# Patient Record
Sex: FEMALE | Race: WHITE | NOT HISPANIC OR LATINO
[De-identification: names, ages, dates, MRNs, and addresses within clinical notes are randomized per-mention and may not be internally consistent; named-entity substitution may affect disease eponyms.]

---

## 2017-01-06 ENCOUNTER — FORM ENCOUNTER (OUTPATIENT)
Age: 62
End: 2017-01-06

## 2017-01-07 ENCOUNTER — APPOINTMENT (OUTPATIENT)
Dept: CT IMAGING | Facility: CLINIC | Age: 62
End: 2017-01-07

## 2017-01-07 ENCOUNTER — OUTPATIENT (OUTPATIENT)
Dept: OUTPATIENT SERVICES | Facility: HOSPITAL | Age: 62
LOS: 1 days | End: 2017-01-07
Payer: COMMERCIAL

## 2017-01-07 DIAGNOSIS — Z00.8 ENCOUNTER FOR OTHER GENERAL EXAMINATION: ICD-10-CM

## 2017-01-07 PROCEDURE — 82565 ASSAY OF CREATININE: CPT

## 2017-01-07 PROCEDURE — 74177 CT ABD & PELVIS W/CONTRAST: CPT

## 2017-01-18 ENCOUNTER — APPOINTMENT (OUTPATIENT)
Dept: UROLOGY | Facility: CLINIC | Age: 62
End: 2017-01-18

## 2017-01-18 ENCOUNTER — LABORATORY RESULT (OUTPATIENT)
Age: 62
End: 2017-01-18

## 2017-01-18 VITALS
WEIGHT: 140 LBS | DIASTOLIC BLOOD PRESSURE: 79 MMHG | SYSTOLIC BLOOD PRESSURE: 116 MMHG | HEART RATE: 85 BPM | HEIGHT: 60 IN | BODY MASS INDEX: 27.48 KG/M2

## 2017-01-18 VITALS — TEMPERATURE: 98 F

## 2017-01-18 DIAGNOSIS — Z82.62 FAMILY HISTORY OF OSTEOPOROSIS: ICD-10-CM

## 2017-01-18 DIAGNOSIS — G47.33 OBSTRUCTIVE SLEEP APNEA (ADULT) (PEDIATRIC): ICD-10-CM

## 2017-01-18 DIAGNOSIS — S62.509A FRACTURE OF UNSPECIFIED PHALANX OF UNSPECIFIED THUMB, INITIAL ENCOUNTER FOR CLOSED FRACTURE: ICD-10-CM

## 2017-01-18 DIAGNOSIS — Z86.39 PERSONAL HISTORY OF OTHER ENDOCRINE, NUTRITIONAL AND METABOLIC DISEASE: ICD-10-CM

## 2017-01-18 DIAGNOSIS — Z85.828 PERSONAL HISTORY OF OTHER MALIGNANT NEOPLASM OF SKIN: ICD-10-CM

## 2017-01-18 DIAGNOSIS — Z84.89 FAMILY HISTORY OF OTHER SPECIFIED CONDITIONS: ICD-10-CM

## 2017-01-18 DIAGNOSIS — Z86.59 PERSONAL HISTORY OF OTHER MENTAL AND BEHAVIORAL DISORDERS: ICD-10-CM

## 2017-01-18 DIAGNOSIS — Z82.5 FAMILY HISTORY OF ASTHMA AND OTHER CHRONIC LOWER RESPIRATORY DISEASES: ICD-10-CM

## 2017-01-18 LAB
BILIRUB UR QL STRIP: NORMAL
CLARITY UR: CLEAR
COLLECTION METHOD: NORMAL
GLUCOSE UR-MCNC: NORMAL
HCG UR QL: NORMAL EU/DL
HGB UR QL STRIP.AUTO: NORMAL
KETONES UR-MCNC: NORMAL
LEUKOCYTE ESTERASE UR QL STRIP: NORMAL
NITRITE UR QL STRIP: NORMAL
PH UR STRIP: 7
PROT UR STRIP-MCNC: NORMAL
SP GR UR STRIP: 1.02

## 2017-01-19 LAB
APPEARANCE: CLEAR
BACTERIA: NEGATIVE
BILIRUBIN URINE: NEGATIVE
BLOOD URINE: NEGATIVE
COLOR: YELLOW
GLUCOSE QUALITATIVE U: NORMAL MG/DL
KETONES URINE: NEGATIVE
LEUKOCYTE ESTERASE URINE: ABNORMAL
MICROSCOPIC-UA: NORMAL
NITRITE URINE: NEGATIVE
PH URINE: 6.5
PROTEIN URINE: NEGATIVE MG/DL
RED BLOOD CELLS URINE: 1 /HPF
SPECIFIC GRAVITY URINE: 1.02
SQUAMOUS EPITHELIAL CELLS: 1 /HPF
UROBILINOGEN URINE: NORMAL MG/DL
WHITE BLOOD CELLS URINE: 2 /HPF

## 2017-01-20 LAB — BACTERIA UR CULT: ABNORMAL

## 2017-01-25 ENCOUNTER — TRANSCRIPTION ENCOUNTER (OUTPATIENT)
Age: 62
End: 2017-01-25

## 2017-01-30 ENCOUNTER — RESULT REVIEW (OUTPATIENT)
Age: 62
End: 2017-01-30

## 2017-01-31 ENCOUNTER — OUTPATIENT (OUTPATIENT)
Dept: OUTPATIENT SERVICES | Facility: HOSPITAL | Age: 62
LOS: 1 days | Discharge: HOME | End: 2017-01-31
Payer: COMMERCIAL

## 2017-01-31 DIAGNOSIS — R19.7 DIARRHEA, UNSPECIFIED: ICD-10-CM

## 2017-01-31 PROCEDURE — 88305 TISSUE EXAM BY PATHOLOGIST: CPT

## 2017-01-31 PROCEDURE — 45380 COLONOSCOPY AND BIOPSY: CPT

## 2017-01-31 PROCEDURE — 88305 TISSUE EXAM BY PATHOLOGIST: CPT | Mod: 26

## 2017-02-02 LAB — SURGICAL PATHOLOGY FINAL REPORT - CH: SIGNIFICANT CHANGE UP

## 2017-03-02 ENCOUNTER — MEDICATION RENEWAL (OUTPATIENT)
Age: 62
End: 2017-03-02

## 2017-03-29 ENCOUNTER — MEDICATION RENEWAL (OUTPATIENT)
Age: 62
End: 2017-03-29

## 2017-03-31 ENCOUNTER — RX RENEWAL (OUTPATIENT)
Age: 62
End: 2017-03-31

## 2017-04-12 ENCOUNTER — APPOINTMENT (OUTPATIENT)
Dept: UROLOGY | Facility: CLINIC | Age: 62
End: 2017-04-12

## 2017-04-12 VITALS
SYSTOLIC BLOOD PRESSURE: 136 MMHG | DIASTOLIC BLOOD PRESSURE: 91 MMHG | BODY MASS INDEX: 27.29 KG/M2 | HEIGHT: 60 IN | HEART RATE: 88 BPM | WEIGHT: 139 LBS

## 2017-04-12 DIAGNOSIS — R31.9 HEMATURIA, UNSPECIFIED: ICD-10-CM

## 2017-04-14 ENCOUNTER — APPOINTMENT (OUTPATIENT)
Dept: DERMATOLOGY | Facility: CLINIC | Age: 62
End: 2017-04-14

## 2017-04-27 ENCOUNTER — OTHER (OUTPATIENT)
Age: 62
End: 2017-04-27

## 2017-07-19 ENCOUNTER — APPOINTMENT (OUTPATIENT)
Dept: FAMILY MEDICINE | Facility: CLINIC | Age: 62
End: 2017-07-19

## 2017-07-19 VITALS
BODY MASS INDEX: 28.07 KG/M2 | HEART RATE: 79 BPM | HEIGHT: 60 IN | OXYGEN SATURATION: 91 % | WEIGHT: 143 LBS | SYSTOLIC BLOOD PRESSURE: 143 MMHG | DIASTOLIC BLOOD PRESSURE: 80 MMHG

## 2017-07-19 RX ORDER — FAMOTIDINE 40 MG/1
40 TABLET, FILM COATED ORAL
Qty: 6 | Refills: 0 | Status: DISCONTINUED | COMMUNITY
Start: 2016-09-07 | End: 2017-07-19

## 2017-07-19 RX ORDER — METHYLPREDNISOLONE 4 MG/1
4 TABLET ORAL
Qty: 21 | Refills: 0 | Status: DISCONTINUED | COMMUNITY
Start: 2016-09-07 | End: 2017-07-19

## 2017-07-19 RX ORDER — BUDESONIDE 9 MG/1
9 TABLET, EXTENDED RELEASE ORAL DAILY
Qty: 30 | Refills: 4 | Status: DISCONTINUED | COMMUNITY
Start: 2017-03-02 | End: 2017-07-19

## 2017-07-19 RX ORDER — SODIUM SULFATE, POTASSIUM SULFATE, MAGNESIUM SULFATE 17.5; 3.13; 1.6 G/ML; G/ML; G/ML
17.5-3.13-1.6 SOLUTION, CONCENTRATE ORAL
Qty: 354 | Refills: 0 | Status: DISCONTINUED | COMMUNITY
Start: 2017-01-11 | End: 2017-07-19

## 2017-07-19 RX ORDER — BACILLUS COAGULANS/INULIN 1B-250 MG
CAPSULE ORAL
Refills: 0 | Status: DISCONTINUED | COMMUNITY
End: 2017-07-19

## 2017-07-20 ENCOUNTER — APPOINTMENT (OUTPATIENT)
Dept: GASTROENTEROLOGY | Facility: CLINIC | Age: 62
End: 2017-07-20

## 2017-07-20 VITALS
RESPIRATION RATE: 16 BRPM | HEART RATE: 79 BPM | OXYGEN SATURATION: 100 % | DIASTOLIC BLOOD PRESSURE: 89 MMHG | HEIGHT: 60 IN | BODY MASS INDEX: 27.48 KG/M2 | SYSTOLIC BLOOD PRESSURE: 131 MMHG | WEIGHT: 140 LBS

## 2017-08-16 ENCOUNTER — APPOINTMENT (OUTPATIENT)
Dept: FAMILY MEDICINE | Facility: CLINIC | Age: 62
End: 2017-08-16
Payer: COMMERCIAL

## 2017-08-16 VITALS
SYSTOLIC BLOOD PRESSURE: 110 MMHG | DIASTOLIC BLOOD PRESSURE: 72 MMHG | HEIGHT: 60 IN | BODY MASS INDEX: 28.27 KG/M2 | WEIGHT: 144 LBS | HEART RATE: 72 BPM

## 2017-08-16 PROCEDURE — 99213 OFFICE O/P EST LOW 20 MIN: CPT

## 2017-08-21 ENCOUNTER — OTHER (OUTPATIENT)
Age: 62
End: 2017-08-21

## 2017-09-06 ENCOUNTER — APPOINTMENT (OUTPATIENT)
Dept: FAMILY MEDICINE | Facility: CLINIC | Age: 62
End: 2017-09-06
Payer: COMMERCIAL

## 2017-09-06 VITALS
HEIGHT: 60 IN | SYSTOLIC BLOOD PRESSURE: 140 MMHG | DIASTOLIC BLOOD PRESSURE: 80 MMHG | WEIGHT: 150 LBS | RESPIRATION RATE: 15 BRPM | HEART RATE: 68 BPM | OXYGEN SATURATION: 97 % | BODY MASS INDEX: 29.45 KG/M2

## 2017-09-06 DIAGNOSIS — F43.9 REACTION TO SEVERE STRESS, UNSPECIFIED: ICD-10-CM

## 2017-09-06 PROCEDURE — 99214 OFFICE O/P EST MOD 30 MIN: CPT

## 2017-09-09 PROBLEM — F43.9 STRESS: Status: ACTIVE | Noted: 2017-09-09

## 2017-09-12 ENCOUNTER — APPOINTMENT (OUTPATIENT)
Dept: OBGYN | Facility: CLINIC | Age: 62
End: 2017-09-12
Payer: COMMERCIAL

## 2017-09-12 VITALS
HEIGHT: 66 IN | WEIGHT: 145 LBS | DIASTOLIC BLOOD PRESSURE: 88 MMHG | SYSTOLIC BLOOD PRESSURE: 134 MMHG | BODY MASS INDEX: 23.3 KG/M2

## 2017-09-12 DIAGNOSIS — Z87.09 PERSONAL HISTORY OF OTHER DISEASES OF THE RESPIRATORY SYSTEM: ICD-10-CM

## 2017-09-12 DIAGNOSIS — Z87.19 PERSONAL HISTORY OF OTHER DISEASES OF THE DIGESTIVE SYSTEM: ICD-10-CM

## 2017-09-12 PROCEDURE — 99396 PREV VISIT EST AGE 40-64: CPT

## 2017-09-14 LAB — HPV HIGH+LOW RISK DNA PNL CVX: NEGATIVE

## 2017-09-18 LAB — CYTOLOGY CVX/VAG DOC THIN PREP: NORMAL

## 2017-10-03 ENCOUNTER — RX RENEWAL (OUTPATIENT)
Age: 62
End: 2017-10-03

## 2017-10-04 ENCOUNTER — APPOINTMENT (OUTPATIENT)
Dept: FAMILY MEDICINE | Facility: CLINIC | Age: 62
End: 2017-10-04
Payer: COMMERCIAL

## 2017-10-04 VITALS
HEART RATE: 91 BPM | HEIGHT: 66 IN | RESPIRATION RATE: 15 BRPM | TEMPERATURE: 98.2 F | DIASTOLIC BLOOD PRESSURE: 70 MMHG | WEIGHT: 145 LBS | BODY MASS INDEX: 23.3 KG/M2 | OXYGEN SATURATION: 98 % | SYSTOLIC BLOOD PRESSURE: 120 MMHG

## 2017-10-04 PROCEDURE — 99214 OFFICE O/P EST MOD 30 MIN: CPT | Mod: 25

## 2017-10-04 PROCEDURE — 90686 IIV4 VACC NO PRSV 0.5 ML IM: CPT

## 2017-10-04 PROCEDURE — G0008: CPT

## 2017-10-17 ENCOUNTER — APPOINTMENT (OUTPATIENT)
Dept: GASTROENTEROLOGY | Facility: CLINIC | Age: 62
End: 2017-10-17
Payer: COMMERCIAL

## 2017-10-17 VITALS
SYSTOLIC BLOOD PRESSURE: 154 MMHG | DIASTOLIC BLOOD PRESSURE: 88 MMHG | HEIGHT: 60 IN | RESPIRATION RATE: 16 BRPM | HEART RATE: 91 BPM | BODY MASS INDEX: 27.88 KG/M2 | WEIGHT: 142 LBS

## 2017-10-17 PROCEDURE — 99214 OFFICE O/P EST MOD 30 MIN: CPT

## 2017-10-20 ENCOUNTER — RESULT REVIEW (OUTPATIENT)
Age: 62
End: 2017-10-20

## 2017-10-20 ENCOUNTER — APPOINTMENT (OUTPATIENT)
Dept: DERMATOLOGY | Facility: CLINIC | Age: 62
End: 2017-10-20
Payer: COMMERCIAL

## 2017-10-20 PROCEDURE — 99214 OFFICE O/P EST MOD 30 MIN: CPT | Mod: 25

## 2017-10-20 PROCEDURE — 17003 DESTRUCT PREMALG LES 2-14: CPT

## 2017-10-20 PROCEDURE — 17000 DESTRUCT PREMALG LESION: CPT

## 2017-10-20 PROCEDURE — 17260 DSTRJ MAL LES T/A/L 0.5 CM/<: CPT | Mod: 59

## 2017-10-30 ENCOUNTER — RX RENEWAL (OUTPATIENT)
Age: 62
End: 2017-10-30

## 2017-11-21 ENCOUNTER — RX RENEWAL (OUTPATIENT)
Age: 62
End: 2017-11-21

## 2017-11-25 ENCOUNTER — RX RENEWAL (OUTPATIENT)
Age: 62
End: 2017-11-25

## 2017-11-27 ENCOUNTER — APPOINTMENT (OUTPATIENT)
Dept: FAMILY MEDICINE | Facility: CLINIC | Age: 62
End: 2017-11-27
Payer: COMMERCIAL

## 2017-11-27 VITALS
HEIGHT: 60 IN | HEART RATE: 88 BPM | SYSTOLIC BLOOD PRESSURE: 120 MMHG | BODY MASS INDEX: 29.06 KG/M2 | DIASTOLIC BLOOD PRESSURE: 68 MMHG | WEIGHT: 148 LBS

## 2017-11-27 PROCEDURE — 99214 OFFICE O/P EST MOD 30 MIN: CPT

## 2017-11-27 RX ORDER — MESALAMINE 375 MG/1
0.38 CAPSULE, EXTENDED RELEASE ORAL DAILY
Qty: 120 | Refills: 3 | Status: DISCONTINUED | COMMUNITY
Start: 2017-10-17 | End: 2017-11-27

## 2017-11-27 RX ORDER — BUDESONIDE 9 MG/1
9 TABLET, EXTENDED RELEASE ORAL DAILY
Qty: 90 | Refills: 1 | Status: DISCONTINUED | COMMUNITY
Start: 2017-04-04 | End: 2017-11-27

## 2017-11-29 ENCOUNTER — RX RENEWAL (OUTPATIENT)
Age: 62
End: 2017-11-29

## 2017-12-20 ENCOUNTER — APPOINTMENT (OUTPATIENT)
Dept: FAMILY MEDICINE | Facility: CLINIC | Age: 62
End: 2017-12-20
Payer: COMMERCIAL

## 2017-12-20 VITALS
HEIGHT: 60 IN | HEART RATE: 100 BPM | BODY MASS INDEX: 28.86 KG/M2 | WEIGHT: 147 LBS | DIASTOLIC BLOOD PRESSURE: 76 MMHG | SYSTOLIC BLOOD PRESSURE: 126 MMHG

## 2017-12-20 PROCEDURE — 99214 OFFICE O/P EST MOD 30 MIN: CPT

## 2017-12-29 ENCOUNTER — MEDICATION RENEWAL (OUTPATIENT)
Age: 62
End: 2017-12-29

## 2018-01-02 ENCOUNTER — RX RENEWAL (OUTPATIENT)
Age: 63
End: 2018-01-02

## 2018-01-09 ENCOUNTER — OTHER (OUTPATIENT)
Age: 63
End: 2018-01-09

## 2018-02-09 ENCOUNTER — APPOINTMENT (OUTPATIENT)
Dept: FAMILY MEDICINE | Facility: CLINIC | Age: 63
End: 2018-02-09
Payer: COMMERCIAL

## 2018-02-09 VITALS
HEIGHT: 60 IN | SYSTOLIC BLOOD PRESSURE: 138 MMHG | WEIGHT: 149.5 LBS | HEART RATE: 88 BPM | DIASTOLIC BLOOD PRESSURE: 80 MMHG | BODY MASS INDEX: 29.35 KG/M2

## 2018-02-09 DIAGNOSIS — R39.9 UNSPECIFIED SYMPTOMS AND SIGNS INVOLVING THE GENITOURINARY SYSTEM: ICD-10-CM

## 2018-02-09 DIAGNOSIS — Z87.440 PERSONAL HISTORY OF URINARY (TRACT) INFECTIONS: ICD-10-CM

## 2018-02-09 DIAGNOSIS — Z87.898 PERSONAL HISTORY OF OTHER SPECIFIED CONDITIONS: ICD-10-CM

## 2018-02-09 DIAGNOSIS — Z92.29 PERSONAL HISTORY OF OTHER DRUG THERAPY: ICD-10-CM

## 2018-02-09 DIAGNOSIS — E04.1 NONTOXIC SINGLE THYROID NODULE: ICD-10-CM

## 2018-02-09 PROCEDURE — 99214 OFFICE O/P EST MOD 30 MIN: CPT

## 2018-02-11 PROBLEM — Z92.29 HISTORY OF INFLUENZA VACCINATION: Status: RESOLVED | Noted: 2017-10-04 | Resolved: 2018-02-11

## 2018-02-20 ENCOUNTER — LABORATORY RESULT (OUTPATIENT)
Age: 63
End: 2018-02-20

## 2018-02-22 ENCOUNTER — OUTPATIENT (OUTPATIENT)
Dept: OUTPATIENT SERVICES | Facility: HOSPITAL | Age: 63
LOS: 1 days | End: 2018-02-22
Payer: COMMERCIAL

## 2018-02-22 DIAGNOSIS — Z51.89 ENCOUNTER FOR OTHER SPECIFIED AFTERCARE: ICD-10-CM

## 2018-02-22 DIAGNOSIS — M54.81 OCCIPITAL NEURALGIA: ICD-10-CM

## 2018-02-27 LAB
25(OH)D3 SERPL-MCNC: 38 NG/ML
ALBUMIN SERPL ELPH-MCNC: 4.2 G/DL
ALP BLD-CCNC: 65 U/L
ALT SERPL-CCNC: 18 U/L
ANION GAP SERPL CALC-SCNC: 15 MMOL/L
APPEARANCE: CLEAR
AST SERPL-CCNC: 20 U/L
BASOPHILS # BLD AUTO: 0.04 K/UL
BASOPHILS NFR BLD AUTO: 0.5 %
BILIRUB SERPL-MCNC: 0.5 MG/DL
BILIRUBIN URINE: NEGATIVE
BLOOD URINE: ABNORMAL
BUN SERPL-MCNC: 14 MG/DL
CALCIUM SERPL-MCNC: 9.4 MG/DL
CHLORIDE SERPL-SCNC: 105 MMOL/L
CHOLEST SERPL-MCNC: 220 MG/DL
CHOLEST/HDLC SERPL: 2.1 RATIO
CO2 SERPL-SCNC: 24 MMOL/L
COLOR: YELLOW
CREAT SERPL-MCNC: 0.67 MG/DL
EOSINOPHIL # BLD AUTO: 0.2 K/UL
EOSINOPHIL NFR BLD AUTO: 2.4 %
GLUCOSE QUALITATIVE U: NEGATIVE MG/DL
GLUCOSE SERPL-MCNC: 105 MG/DL
HBA1C MFR BLD HPLC: 5.9 %
HCT VFR BLD CALC: 38.8 %
HDLC SERPL-MCNC: 104 MG/DL
HGB BLD-MCNC: 12.2 G/DL
IMM GRANULOCYTES NFR BLD AUTO: 0.1 %
KETONES URINE: NEGATIVE
LDLC SERPL CALC-MCNC: 90 MG/DL
LEUKOCYTE ESTERASE URINE: ABNORMAL
LYMPHOCYTES # BLD AUTO: 3.34 K/UL
LYMPHOCYTES NFR BLD AUTO: 40.4 %
MAN DIFF?: NORMAL
MCHC RBC-ENTMCNC: 31.4 GM/DL
MCHC RBC-ENTMCNC: 32.1 PG
MCV RBC AUTO: 102.1 FL
MONOCYTES # BLD AUTO: 0.58 K/UL
MONOCYTES NFR BLD AUTO: 7 %
NEUTROPHILS # BLD AUTO: 4.09 K/UL
NEUTROPHILS NFR BLD AUTO: 49.6 %
NITRITE URINE: NEGATIVE
PH URINE: 6.5
PLATELET # BLD AUTO: 330 K/UL
POTASSIUM SERPL-SCNC: 4.5 MMOL/L
PROT SERPL-MCNC: 6.8 G/DL
PROTEIN URINE: NEGATIVE MG/DL
RBC # BLD: 3.8 M/UL
RBC # FLD: 14.2 %
SODIUM SERPL-SCNC: 144 MMOL/L
SPECIFIC GRAVITY URINE: 1.02
T4 FREE SERPL-MCNC: 0.8 NG/DL
TRIGL SERPL-MCNC: 132 MG/DL
TSH SERPL-ACNC: 7.18 UIU/ML
UROBILINOGEN URINE: NEGATIVE MG/DL
WBC # FLD AUTO: 8.26 K/UL

## 2018-03-19 PROCEDURE — 97110 THERAPEUTIC EXERCISES: CPT

## 2018-03-19 PROCEDURE — 97140 MANUAL THERAPY 1/> REGIONS: CPT

## 2018-03-19 PROCEDURE — 97163 PT EVAL HIGH COMPLEX 45 MIN: CPT

## 2018-03-19 PROCEDURE — 97010 HOT OR COLD PACKS THERAPY: CPT

## 2018-03-21 ENCOUNTER — APPOINTMENT (OUTPATIENT)
Dept: FAMILY MEDICINE | Facility: CLINIC | Age: 63
End: 2018-03-21

## 2018-04-20 ENCOUNTER — RX RENEWAL (OUTPATIENT)
Age: 63
End: 2018-04-20

## 2018-04-27 ENCOUNTER — RX RENEWAL (OUTPATIENT)
Age: 63
End: 2018-04-27

## 2018-07-17 ENCOUNTER — RX RENEWAL (OUTPATIENT)
Age: 63
End: 2018-07-17

## 2018-07-23 ENCOUNTER — RX RENEWAL (OUTPATIENT)
Age: 63
End: 2018-07-23

## 2018-07-31 ENCOUNTER — APPOINTMENT (OUTPATIENT)
Dept: GASTROENTEROLOGY | Facility: CLINIC | Age: 63
End: 2018-07-31
Payer: COMMERCIAL

## 2018-07-31 VITALS
HEIGHT: 60 IN | WEIGHT: 120 LBS | HEART RATE: 82 BPM | DIASTOLIC BLOOD PRESSURE: 85 MMHG | SYSTOLIC BLOOD PRESSURE: 140 MMHG | OXYGEN SATURATION: 98 % | BODY MASS INDEX: 23.56 KG/M2

## 2018-07-31 PROCEDURE — 99214 OFFICE O/P EST MOD 30 MIN: CPT

## 2018-07-31 RX ORDER — ALPRAZOLAM 0.5 MG/1
0.5 TABLET ORAL
Qty: 15 | Refills: 0 | Status: DISCONTINUED | COMMUNITY
Start: 2017-07-19 | End: 2018-07-31

## 2018-08-06 ENCOUNTER — APPOINTMENT (OUTPATIENT)
Dept: FAMILY MEDICINE | Facility: CLINIC | Age: 63
End: 2018-08-06
Payer: COMMERCIAL

## 2018-08-06 VITALS
SYSTOLIC BLOOD PRESSURE: 124 MMHG | WEIGHT: 123 LBS | HEIGHT: 60 IN | HEART RATE: 84 BPM | BODY MASS INDEX: 24.15 KG/M2 | DIASTOLIC BLOOD PRESSURE: 68 MMHG

## 2018-08-06 PROCEDURE — 99213 OFFICE O/P EST LOW 20 MIN: CPT

## 2018-08-06 NOTE — PHYSICAL EXAM
[No Acute Distress] : no acute distress [Well Nourished] : well nourished [Well-Appearing] : well-appearing [No Respiratory Distress] : no respiratory distress  [Clear to Auscultation] : lungs were clear to auscultation bilaterally [No Accessory Muscle Use] : no accessory muscle use [Normal Rate] : normal rate  [Regular Rhythm] : with a regular rhythm [Normal S1, S2] : normal S1 and S2 [Speech Grossly Normal] : speech grossly normal [Normal Mood] : the mood was normal [Normal Insight/Judgement] : insight and judgment were intact

## 2018-08-06 NOTE — HISTORY OF PRESENT ILLNESS
[FreeTextEntry1] : F/U ANXIETY [de-identified] : PRESENTING FOR FOLLOW-UP OF ANXIETY.  IS FEELING "GREAT" ON PAXIL.  NO ANXIETY OR FEELING DOWN OR DEPRESSED.  NO HEADACHES.  NO PANIC ATTACKS.  GOOD SUPPORT SYSTEM.  DENIES SUICIDAL/HOMICIDAL IDEATIONS OR PLANS.  SOCIALIZING AND SLEEPING WELL.  GOOD APPETITE.  NO NEW COMPLAINTS TODAY.

## 2018-08-06 NOTE — REVIEW OF SYSTEMS
[Fever] : no fever [Fatigue] : no fatigue [Chest Pain] : no chest pain [Palpitations] : no palpitations [Shortness Of Breath] : no shortness of breath [Cough] : no cough [Suicidal] : not suicidal [Depression] : no depression

## 2018-08-06 NOTE — PLAN
[FreeTextEntry1] : DOING WELL ON PAROXETINE - WILL CONTINUE\par GREAT SUPPORT SYSTEM\par STRESS REDUCTION\par FOLLOW-UP ALL SPECIALISTS AS DIRECTED \par HAS APPOINTMENT FOR CPE\par CALL WITH ANY QUESTIONS, CONCERNS OR CHANGES

## 2018-08-22 LAB — HEMOCCULT STL QL IA: POSITIVE

## 2018-09-10 ENCOUNTER — APPOINTMENT (OUTPATIENT)
Dept: DERMATOLOGY | Facility: CLINIC | Age: 63
End: 2018-09-10
Payer: COMMERCIAL

## 2018-09-10 PROCEDURE — 99213 OFFICE O/P EST LOW 20 MIN: CPT | Mod: 25

## 2018-09-10 PROCEDURE — 17000 DESTRUCT PREMALG LESION: CPT

## 2018-09-11 ENCOUNTER — RX RENEWAL (OUTPATIENT)
Age: 63
End: 2018-09-11

## 2018-09-13 ENCOUNTER — APPOINTMENT (OUTPATIENT)
Dept: OBGYN | Facility: CLINIC | Age: 63
End: 2018-09-13
Payer: COMMERCIAL

## 2018-09-13 VITALS
SYSTOLIC BLOOD PRESSURE: 140 MMHG | HEIGHT: 60 IN | BODY MASS INDEX: 24.35 KG/M2 | WEIGHT: 124 LBS | DIASTOLIC BLOOD PRESSURE: 80 MMHG

## 2018-09-13 PROCEDURE — 99396 PREV VISIT EST AGE 40-64: CPT

## 2018-09-13 PROCEDURE — 82270 OCCULT BLOOD FECES: CPT

## 2018-09-17 LAB — HPV HIGH+LOW RISK DNA PNL CVX: NOT DETECTED

## 2018-09-18 LAB — CYTOLOGY CVX/VAG DOC THIN PREP: NORMAL

## 2018-09-25 ENCOUNTER — APPOINTMENT (OUTPATIENT)
Dept: GASTROENTEROLOGY | Facility: CLINIC | Age: 63
End: 2018-09-25
Payer: COMMERCIAL

## 2018-09-25 VITALS
DIASTOLIC BLOOD PRESSURE: 62 MMHG | OXYGEN SATURATION: 99 % | BODY MASS INDEX: 24.15 KG/M2 | SYSTOLIC BLOOD PRESSURE: 110 MMHG | WEIGHT: 123 LBS | HEIGHT: 60 IN | HEART RATE: 70 BPM

## 2018-09-25 PROCEDURE — 99214 OFFICE O/P EST MOD 30 MIN: CPT

## 2018-10-02 ENCOUNTER — APPOINTMENT (OUTPATIENT)
Dept: FAMILY MEDICINE | Facility: CLINIC | Age: 63
End: 2018-10-02
Payer: COMMERCIAL

## 2018-10-02 ENCOUNTER — NON-APPOINTMENT (OUTPATIENT)
Age: 63
End: 2018-10-02

## 2018-10-02 VITALS
BODY MASS INDEX: 24.35 KG/M2 | WEIGHT: 124 LBS | HEIGHT: 60 IN | SYSTOLIC BLOOD PRESSURE: 130 MMHG | TEMPERATURE: 98.3 F | DIASTOLIC BLOOD PRESSURE: 80 MMHG | HEART RATE: 80 BPM

## 2018-10-02 PROCEDURE — 93000 ELECTROCARDIOGRAM COMPLETE: CPT

## 2018-10-02 PROCEDURE — 90686 IIV4 VACC NO PRSV 0.5 ML IM: CPT

## 2018-10-02 PROCEDURE — 99396 PREV VISIT EST AGE 40-64: CPT | Mod: 25

## 2018-10-02 PROCEDURE — G0008: CPT

## 2018-10-07 NOTE — PHYSICAL EXAM
[No Acute Distress] : no acute distress [Well Nourished] : well nourished [Well-Appearing] : well-appearing [Normal Sclera/Conjunctiva] : normal sclera/conjunctiva [PERRL] : pupils equal round and reactive to light [Normal Outer Ear/Nose] : the outer ears and nose were normal in appearance [Normal Oropharynx] : the oropharynx was normal [Normal TMs] : both tympanic membranes were normal [Supple] : supple [No Lymphadenopathy] : no lymphadenopathy [No Respiratory Distress] : no respiratory distress  [Clear to Auscultation] : lungs were clear to auscultation bilaterally [No Accessory Muscle Use] : no accessory muscle use [Normal Rate] : normal rate  [Regular Rhythm] : with a regular rhythm [Normal S1, S2] : normal S1 and S2 [Soft] : abdomen soft [Non Tender] : non-tender [Normal Bowel Sounds] : normal bowel sounds [No Joint Swelling] : no joint swelling [Grossly Normal Strength/Tone] : grossly normal strength/tone [No Rash] : no rash [Normal Gait] : normal gait [No Focal Deficits] : no focal deficits [Speech Grossly Normal] : speech grossly normal [Normal Mood] : the mood was normal [Normal Insight/Judgement] : insight and judgment were intact [Acne] : no acne

## 2018-10-07 NOTE — COUNSELING
[Healthy eating counseling provided] : healthy eating [Activity counseling provided] : activity [Behavioral health counseling provided] : behavioral health  [Low Fat Diet] : Low fat diet [Walking] : Walking [Engage in a relaxing activity] : Engage in a relaxing activity [None] : None [Good understanding] : Patient has a good understanding of lifestyle changes and the steps needed to achieve self management goals

## 2018-10-07 NOTE — HISTORY OF PRESENT ILLNESS
[FreeTextEntry1] : wellness exam [de-identified] : PRESENTING FOR YEARLY WELLNESS EXAM.  CHRONIC HISTORY OF COLITIS, SYLVIA, OSTEOPOROSIS AND THYROID NODULE.  ALSO HISTORY OF ANXIETY ON PAROXETINE.  FEELING WELL TODAY.  HAD YEARLY EYE EXAM.  EATING HEALTHY.  NOT EXERCISING THE PAST WEEK BUT IN GENERAL HAS BEEN GOOD WITH EXERCISE - ALMOST EVERY DAY.  HISTORY OF TOBACCO USE.  SMOKED 43 YEARS UP TO 1 PPD.  QUIT 6 YEARS AGO.  DENIES ANY PULMONARY SYMPTOMS.\par DERMATOLOGY: DR. LLANES - DANO SEEN FOR YEARLY SCREENING\par GYN: DR. MAYERS SEND IN SEPTEMBER\par UP TO DATE WITH MAMMOGRAM, BONE DENSITY AND COLONOSCOPY\par GI: DR. HUGHES - JUST SEEN\par ORTHOPEDICS: DR. RODRIGUEZ - TO BE SEEN ON THURSDAY - SENT BY [facility] FOR "TORN HAMSTRING"\par ENDOCRINOLOGY: DR. RECINOS - MONITORING THYROID NODULES - HAS UPCOMING APPOINTMENT SCHEDULED\par UROLOGY: EVALUATION FOR MICROSCOPIC HEMATURIA

## 2018-10-07 NOTE — PLAN
[FreeTextEntry1] : FLU VACCINE GIVEN AND TOLERATED WELL \par CONSIDER SHINGLES VACCINE\par VISION SCREENING\par HEALTHY DIET AND LIFESTYLE MODIFICATIONS\par CHECK ROUTINE LAB WORK\par EKG: NSR AT 72 BPM, NO ACUTE ST-T WAVE CHANGES; NO SIGNIFICANT CHANGE FROM PRIOR\par CT FOR LUNG CANCER SCREENING\par FOLLOW-UP ALL SPECIALISTS AS DIRECTED \par CALL WITH ANY QUESTIONS, CONCERNS OR CHANGES

## 2018-10-07 NOTE — HEALTH RISK ASSESSMENT
[Very Good] : ~his/her~ current health as very good [Good] : ~his/her~  mood as  good [No falls in past year] : Patient reported no falls in the past year [0] : 2) Feeling down, depressed, or hopeless: Not at all (0) [Patient reported mammogram was normal] : Patient reported mammogram was normal [Patient reported PAP Smear was normal] : Patient reported PAP Smear was normal [Patient reported bone density results were abnormal] : Patient reported bone density results were abnormal [Patient reported colonoscopy was normal] : Patient reported colonoscopy was normal [HIV test declined] : HIV test declined [Hepatitis C test declined] : Hepatitis C test declined [None] : None [With Significant Other] : lives with significant other [# of Members in Household ___] :  household currently consist of [unfilled] member(s) [Retired] : retired [College] : College [] :  [# Of Children ___] : has [unfilled] children [Feels Safe at Home] : Feels safe at home [Fully functional (bathing, dressing, toileting, transferring, walking, feeding)] : Fully functional (bathing, dressing, toileting, transferring, walking, feeding) [Fully functional (using the telephone, shopping, preparing meals, housekeeping, doing laundry, using] : Fully functional and needs no help or supervision to perform IADLs (using the telephone, shopping, preparing meals, housekeeping, doing laundry, using transportation, managing medications and managing finances) [Smoke Detector] : smoke detector [Carbon Monoxide Detector] : carbon monoxide detector [Safety elements used in home] : safety elements used in home [Seat Belt] :  uses seat belt [Sunscreen] : uses sunscreen [Discussed at today's visit] : Advance Directives Discussed at today's visit [Designated Healthcare Proxy] : Designated healthcare proxy [Name: ___] : Health Care Proxy's Name: [unfilled]  [Relationship: ___] : Relationship: [unfilled] [] : No [de-identified] : SOCIALLY [[id number]] : 0 [Change in mental status noted] : No change in mental status noted [Language] : denies difficulty with language [Learning/Retaining New Information] : denies difficulty learning/retaining new information [Handling Complex Tasks] : denies difficulty handling complex tasks [High Risk Behavior] : no high risk behavior [Reports changes in hearing] : Reports no changes in hearing [Reports changes in vision] : Reports no changes in vision [Reports changes in dental health] : Reports no changes in dental health [MammogramDate] : 09/18 [PapSmearDate] : 09/18 [BoneDensityDate] : 09/17 [BoneDensityComments] : OSTEOPENIA [ColonoscopyDate] : 01/17 [de-identified] : [employer] FOR DISTANCE AND READING [de-identified] : SEES DENTIST ROUTINELY [FreeTextEntry4] : [employer] PROXY: CATRACHITO BEARD ([place])

## 2018-10-07 NOTE — REVIEW OF SYSTEMS
[Fever] : no fever [Chills] : no chills [Discharge] : no discharge [Pain] : no pain [Earache] : no earache [Nasal Discharge] : no nasal discharge [Sore Throat] : no sore throat [Chest Pain] : no chest pain [Palpitations] : no palpitations [Lower Ext Edema] : no lower extremity edema [Shortness Of Breath] : no shortness of breath [Wheezing] : no wheezing [Cough] : no cough [Abdominal Pain] : no abdominal pain [Diarrhea] : diarrhea [Vomiting] : no vomiting [Melena] : no melena [Dysuria] : no dysuria [Hematuria] : no hematuria [Joint Pain] : no joint pain [Muscle Pain] : no muscle pain [Itching] : no itching [Skin Rash] : no skin rash [Headache] : no headache [Dizziness] : no dizziness [Fainting] : no fainting [Suicidal] : not suicidal [Depression] : no depression [Easy Bleeding] : no easy bleeding [Easy Bruising] : no easy bruising

## 2018-10-16 ENCOUNTER — TRANSCRIPTION ENCOUNTER (OUTPATIENT)
Age: 63
End: 2018-10-16

## 2018-10-27 ENCOUNTER — TRANSCRIPTION ENCOUNTER (OUTPATIENT)
Age: 63
End: 2018-10-27

## 2018-10-29 LAB
ALBUMIN SERPL ELPH-MCNC: 4.4 G/DL
ALP BLD-CCNC: 66 U/L
ALT SERPL-CCNC: 23 U/L
ANION GAP SERPL CALC-SCNC: 14 MMOL/L
AST SERPL-CCNC: 33 U/L
BASOPHILS # BLD AUTO: 0.04 K/UL
BASOPHILS NFR BLD AUTO: 0.5 %
BILIRUB SERPL-MCNC: 0.4 MG/DL
BUN SERPL-MCNC: 16 MG/DL
CALCIUM SERPL-MCNC: 9.2 MG/DL
CHLORIDE SERPL-SCNC: 102 MMOL/L
CHOLEST SERPL-MCNC: 217 MG/DL
CHOLEST/HDLC SERPL: 2.4 RATIO
CO2 SERPL-SCNC: 23 MMOL/L
CREAT SERPL-MCNC: 0.61 MG/DL
EOSINOPHIL # BLD AUTO: 0.22 K/UL
EOSINOPHIL NFR BLD AUTO: 2.6 %
GLUCOSE SERPL-MCNC: 91 MG/DL
HBA1C MFR BLD HPLC: 5.7 %
HCT VFR BLD CALC: 36.9 %
HDLC SERPL-MCNC: 91 MG/DL
HGB BLD-MCNC: 11.7 G/DL
IMM GRANULOCYTES NFR BLD AUTO: 0.2 %
LDLC SERPL CALC-MCNC: 92 MG/DL
LYMPHOCYTES # BLD AUTO: 3.22 K/UL
LYMPHOCYTES NFR BLD AUTO: 38.1 %
MAN DIFF?: NORMAL
MCHC RBC-ENTMCNC: 31.7 GM/DL
MCHC RBC-ENTMCNC: 31.9 PG
MCV RBC AUTO: 100.5 FL
MONOCYTES # BLD AUTO: 0.63 K/UL
MONOCYTES NFR BLD AUTO: 7.4 %
NEUTROPHILS # BLD AUTO: 4.33 K/UL
NEUTROPHILS NFR BLD AUTO: 51.2 %
PLATELET # BLD AUTO: 375 K/UL
POTASSIUM SERPL-SCNC: 4.4 MMOL/L
PROT SERPL-MCNC: 6.7 G/DL
RBC # BLD: 3.67 M/UL
RBC # FLD: 14.5 %
SODIUM SERPL-SCNC: 139 MMOL/L
TRIGL SERPL-MCNC: 170 MG/DL
WBC # FLD AUTO: 8.46 K/UL

## 2018-11-04 ENCOUNTER — RX RENEWAL (OUTPATIENT)
Age: 63
End: 2018-11-04

## 2018-11-15 ENCOUNTER — FORM ENCOUNTER (OUTPATIENT)
Age: 63
End: 2018-11-15

## 2018-11-16 ENCOUNTER — APPOINTMENT (OUTPATIENT)
Dept: CT IMAGING | Facility: CLINIC | Age: 63
End: 2018-11-16
Payer: COMMERCIAL

## 2018-11-16 ENCOUNTER — OUTPATIENT (OUTPATIENT)
Dept: OUTPATIENT SERVICES | Facility: HOSPITAL | Age: 63
LOS: 1 days | End: 2018-11-16
Payer: COMMERCIAL

## 2018-11-16 DIAGNOSIS — Z87.891 PERSONAL HISTORY OF NICOTINE DEPENDENCE: ICD-10-CM

## 2018-11-16 PROCEDURE — G0297: CPT

## 2018-11-16 PROCEDURE — G0297: CPT | Mod: 26

## 2018-11-21 ENCOUNTER — APPOINTMENT (OUTPATIENT)
Dept: DERMATOLOGY | Facility: CLINIC | Age: 63
End: 2018-11-21
Payer: COMMERCIAL

## 2018-11-21 ENCOUNTER — APPOINTMENT (OUTPATIENT)
Dept: ORTHOPEDIC SURGERY | Facility: CLINIC | Age: 63
End: 2018-11-21
Payer: COMMERCIAL

## 2018-11-21 ENCOUNTER — RESULT REVIEW (OUTPATIENT)
Age: 63
End: 2018-11-21

## 2018-11-21 VITALS
BODY MASS INDEX: 24.35 KG/M2 | HEART RATE: 71 BPM | WEIGHT: 124 LBS | DIASTOLIC BLOOD PRESSURE: 88 MMHG | SYSTOLIC BLOOD PRESSURE: 148 MMHG | HEIGHT: 60 IN

## 2018-11-21 PROCEDURE — 99204 OFFICE O/P NEW MOD 45 MIN: CPT

## 2018-11-21 PROCEDURE — 97597 DBRDMT OPN WND 1ST 20 CM/<: CPT | Mod: 59

## 2018-11-21 PROCEDURE — 99213 OFFICE O/P EST LOW 20 MIN: CPT | Mod: 25

## 2018-11-21 PROCEDURE — 17003 DESTRUCT PREMALG LES 2-14: CPT

## 2018-11-21 PROCEDURE — 11100 BX SKIN SUBCUTANEOUS&/MUCOUS MEMBRANE 1 LESION: CPT | Mod: 59

## 2018-11-21 PROCEDURE — 73140 X-RAY EXAM OF FINGER(S): CPT | Mod: F6

## 2018-11-21 PROCEDURE — 17000 DESTRUCT PREMALG LESION: CPT

## 2018-12-28 ENCOUNTER — OUTPATIENT (OUTPATIENT)
Dept: OUTPATIENT SERVICES | Facility: HOSPITAL | Age: 63
LOS: 1 days | End: 2018-12-28
Payer: COMMERCIAL

## 2018-12-28 VITALS
TEMPERATURE: 99 F | HEIGHT: 60 IN | WEIGHT: 125.66 LBS | RESPIRATION RATE: 16 BRPM | HEART RATE: 65 BPM | SYSTOLIC BLOOD PRESSURE: 144 MMHG | DIASTOLIC BLOOD PRESSURE: 86 MMHG

## 2018-12-28 DIAGNOSIS — M71.349 OTHER BURSAL CYST, UNSPECIFIED HAND: ICD-10-CM

## 2018-12-28 DIAGNOSIS — K37 UNSPECIFIED APPENDICITIS: Chronic | ICD-10-CM

## 2018-12-28 DIAGNOSIS — Z29.9 ENCOUNTER FOR PROPHYLACTIC MEASURES, UNSPECIFIED: ICD-10-CM

## 2018-12-28 DIAGNOSIS — Z01.818 ENCOUNTER FOR OTHER PREPROCEDURAL EXAMINATION: ICD-10-CM

## 2018-12-28 DIAGNOSIS — Z98.891 HISTORY OF UTERINE SCAR FROM PREVIOUS SURGERY: Chronic | ICD-10-CM

## 2018-12-28 DIAGNOSIS — M85.80 OTHER SPECIFIED DISORDERS OF BONE DENSITY AND STRUCTURE, UNSPECIFIED SITE: ICD-10-CM

## 2018-12-28 DIAGNOSIS — Z98.890 OTHER SPECIFIED POSTPROCEDURAL STATES: Chronic | ICD-10-CM

## 2018-12-28 LAB
ANION GAP SERPL CALC-SCNC: 13 MMOL/L — SIGNIFICANT CHANGE UP (ref 5–17)
APTT BLD: 31 SEC — SIGNIFICANT CHANGE UP (ref 27.5–36.3)
BASOPHILS # BLD AUTO: 0 K/UL — SIGNIFICANT CHANGE UP (ref 0–0.2)
BASOPHILS NFR BLD AUTO: 0.7 % — SIGNIFICANT CHANGE UP (ref 0–2)
BUN SERPL-MCNC: 16 MG/DL — SIGNIFICANT CHANGE UP (ref 8–20)
CALCIUM SERPL-MCNC: 9.6 MG/DL — SIGNIFICANT CHANGE UP (ref 8.6–10.2)
CHLORIDE SERPL-SCNC: 99 MMOL/L — SIGNIFICANT CHANGE UP (ref 98–107)
CO2 SERPL-SCNC: 24 MMOL/L — SIGNIFICANT CHANGE UP (ref 22–29)
CREAT SERPL-MCNC: 0.49 MG/DL — LOW (ref 0.5–1.3)
EOSINOPHIL # BLD AUTO: 0.2 K/UL — SIGNIFICANT CHANGE UP (ref 0–0.5)
EOSINOPHIL NFR BLD AUTO: 3.1 % — SIGNIFICANT CHANGE UP (ref 0–6)
GLUCOSE SERPL-MCNC: 92 MG/DL — SIGNIFICANT CHANGE UP (ref 70–115)
HCT VFR BLD CALC: 36.2 % — LOW (ref 37–47)
HGB BLD-MCNC: 11.6 G/DL — LOW (ref 12–16)
INR BLD: 0.85 RATIO — LOW (ref 0.88–1.16)
LYMPHOCYTES # BLD AUTO: 2.7 K/UL — SIGNIFICANT CHANGE UP (ref 1–4.8)
LYMPHOCYTES # BLD AUTO: 38.1 % — SIGNIFICANT CHANGE UP (ref 20–55)
MCHC RBC-ENTMCNC: 32 G/DL — SIGNIFICANT CHANGE UP (ref 32–36)
MCHC RBC-ENTMCNC: 32.6 PG — HIGH (ref 27–31)
MCV RBC AUTO: 101.7 FL — HIGH (ref 81–99)
MONOCYTES # BLD AUTO: 0.4 K/UL — SIGNIFICANT CHANGE UP (ref 0–0.8)
MONOCYTES NFR BLD AUTO: 6 % — SIGNIFICANT CHANGE UP (ref 3–10)
NEUTROPHILS # BLD AUTO: 3.6 K/UL — SIGNIFICANT CHANGE UP (ref 1.8–8)
NEUTROPHILS NFR BLD AUTO: 51.8 % — SIGNIFICANT CHANGE UP (ref 37–73)
PLATELET # BLD AUTO: 368 K/UL — SIGNIFICANT CHANGE UP (ref 150–400)
POTASSIUM SERPL-MCNC: 3.9 MMOL/L — SIGNIFICANT CHANGE UP (ref 3.5–5.3)
POTASSIUM SERPL-SCNC: 3.9 MMOL/L — SIGNIFICANT CHANGE UP (ref 3.5–5.3)
PROTHROM AB SERPL-ACNC: 9.7 SEC — LOW (ref 10–12.9)
RBC # BLD: 3.56 M/UL — LOW (ref 4.4–5.2)
RBC # FLD: 14.1 % — SIGNIFICANT CHANGE UP (ref 11–15.6)
SODIUM SERPL-SCNC: 136 MMOL/L — SIGNIFICANT CHANGE UP (ref 135–145)
WBC # BLD: 7 K/UL — SIGNIFICANT CHANGE UP (ref 4.8–10.8)
WBC # FLD AUTO: 7 K/UL — SIGNIFICANT CHANGE UP (ref 4.8–10.8)

## 2018-12-28 PROCEDURE — 86803 HEPATITIS C AB TEST: CPT

## 2018-12-28 PROCEDURE — 85027 COMPLETE CBC AUTOMATED: CPT

## 2018-12-28 PROCEDURE — 36415 COLL VENOUS BLD VENIPUNCTURE: CPT

## 2018-12-28 PROCEDURE — 93005 ELECTROCARDIOGRAM TRACING: CPT

## 2018-12-28 PROCEDURE — 85730 THROMBOPLASTIN TIME PARTIAL: CPT

## 2018-12-28 PROCEDURE — 85610 PROTHROMBIN TIME: CPT

## 2018-12-28 PROCEDURE — 80048 BASIC METABOLIC PNL TOTAL CA: CPT

## 2018-12-28 PROCEDURE — 93010 ELECTROCARDIOGRAM REPORT: CPT

## 2018-12-28 PROCEDURE — G0463: CPT

## 2018-12-28 NOTE — H&P PST ADULT - ASSESSMENT
62 y/o female seen today for right index finger mucoid cyst excision and DIP debridement finger tourniquet. Surgery protocol reviewed with pt today. Pt to follow-up with PCP for clearance   CAPRINI SCORE [CLOT]    AGE RELATED RISK FACTORS                                                       MOBILITY RELATED FACTORS  [ ] Age 41-60 years                                            (1 Point)                  [ ] Bed rest                                                        (1 Point)  [ x] Age: 61-74 years                                           (2 Points)                 [ ] Plaster cast                                                   (2 Points)  [ ] Age= 75 years                                              (3 Points)                 [ ] Bed bound for more than 72 hours                 (2 Points)    DISEASE RELATED RISK FACTORS                                               GENDER SPECIFIC FACTORS  [ ] Edema in the lower extremities                       (1 Point)                  [ ] Pregnancy                                                     (1 Point)  [x ] Varicose veins                                               (1 Point)                  [ ] Post-partum < 6 weeks                                   (1 Point)             [ ] BMI > 25 Kg/m2                                            (1 Point)                  [ ] Hormonal therapy  or oral contraception          (1 Point)                 [ ] Sepsis (in the previous month)                        (1 Point)                  [ ] History of pregnancy complications                 (1 point)  [ ] Pneumonia or serious lung disease                                               [ ] Unexplained or recurrent                     (1 Point)           (in the previous month)                               (1 Point)  [ ] Abnormal pulmonary function test                     (1 Point)                 SURGERY RELATED RISK FACTORS  [ ] Acute myocardial infarction                              (1 Point)                 [ ]  Section                                             (1 Point)  [ ] Congestive heart failure (in the previous month)  (1 Point)               [ ] Minor surgery                                                  (1 Point)   [ ] Inflammatory bowel disease                             (1 Point)                 [x ] Arthroscopic surgery                                        (2 Points)  [ ] Central venous access                                      (2 Points)                [ ] General surgery lasting more than 45 minutes   (2 Points)       [ ] Stroke (in the previous month)                          (5 Points)               [ ] Elective arthroplasty                                         (5 Points)                                                                                                                                               HEMATOLOGY RELATED FACTORS                                                 TRAUMA RELATED RISK FACTORS  [ ] Prior episodes of VTE                                     (3 Points)                [ ] Fracture of the hip, pelvis, or leg                       (5 Points)  [ ] Positive family history for VTE                         (3 Points)                 [ ] Acute spinal cord injury (in the previous month)  (5 Points)  [ ] Prothrombin 35331 A                                     (3 Points)                 [ ] Paralysis  (less than 1 month)                             (5 Points)  [ ] Factor V Leiden                                             (3 Points)                  [ ] Multiple Trauma within 1 month                        (5 Points)  [ ] Lupus anticoagulants                                     (3 Points)                                                           [ ] Anticardiolipin antibodies                               (3 Points)                                                       [ ] High homocysteine in the blood                      (3 Points)                                             [ ] Other congenital or acquired thrombophilia      (3 Points)                                                [ ] Heparin induced thrombocytopenia                  (3 Points)                                          Total Score [      5   ]  OPIOID RISK TOOL    AMANDA EACH BOX THAT APPLIES AND ADD TOTALS AT THE END    FAMILY HISTORY OF SUBSTANCE ABUSE                 FEMALE         MALE                                                Alcohol                             [  ]1 pt          [  ]3pts                                               Illegal Durgs                     [  ]2 pts        [  ]3pts                                               Rx Drugs                           [  ]4 pts        [  ]4 pts    PERSONAL HISTORY OF SUBSTANCE ABUSE                                                                                          Alcohol                             [  ]3 pts       [  ]3 pts                                               Illegal Drugs                     [  ]4 pts        [  ]4 pts                                               Rx Drugs                           [  ]5 pts        [  ]5 pts    AGE BETWEEN 16-45 YEARS                                      [  ]1 pt         [  ]1 pt    HISTORY OF PREADOLESCENT   SEXUAL ABUSE                                                             [  ]3 pts        [  ]0pts    PSYCHOLOGICAL DISEASE                     ADD, OCD, Bipolar, Schizophrenia        [  ]2 pts         [  ]2 pts                      Depression                                               [ x ]1 pt           [  ]1 pt           SCORING TOTAL   (add numbers and type here)              (*1**)                                     A score of 3 or lower indicated LOW risk for future opioid abuse  A score of 4 to 7 indicated moderate risk for future opioid abuse  A score of 8 or higher indicates a high risk for opioid abuse

## 2018-12-28 NOTE — H&P PST ADULT - PMH
Bursal cyst  right hand  Colitis    Hypothyroid  Currently not on any medication. Last thyroid study/ lab work done October 2018.  Osteopenia    Skin cancer, basal cell  facial- surgery done 2011 ?  Sleep apnea, unspecified type

## 2018-12-28 NOTE — H&P PST ADULT - HISTORY OF PRESENT ILLNESS
64 y/o female seen today pre-op right index finger mucoid cyst excision and DIP debridement finger tourniquet due to bursal cyst of right index finger. Pt endorsed pain, stiffness and muscle  weakness to sit. States pain got worse about two months ago

## 2018-12-28 NOTE — H&P PST ADULT - FAMILY HISTORY
Mother  Still living? No  Family history of hypertension in mother, Age at diagnosis: Age Unknown  Family history of COPD (chronic obstructive pulmonary disease), Age at diagnosis: Age Unknown     Father  Still living? No  Family history of hypertension in mother, Age at diagnosis: Age Unknown  Family history of lung cancer, Age at diagnosis: Age Unknown

## 2018-12-28 NOTE — H&P PST ADULT - ATTENDING COMMENTS
I personally examined the patient at preop holding area and no new findings since the H&P was last updated.

## 2018-12-28 NOTE — H&P PST ADULT - HEMATOLOGY/LYMPHATICS
Anesthesia Post-op Note    POD #1 S/P     Patient is doing well. OOBAA. Tolerating clears. Pain is tolerable. Patient denies HA/N/V/Dizziness. Denies numbness/tingliness/pain to LE. No residual anesthetic issues or complications noted. V/SS wnl as per flowsheet. negative

## 2018-12-29 LAB
HCV AB S/CO SERPL IA: 0.13 S/CO — SIGNIFICANT CHANGE UP
HCV AB SERPL-IMP: SIGNIFICANT CHANGE UP

## 2018-12-30 PROBLEM — C44.91 BASAL CELL CARCINOMA OF SKIN, UNSPECIFIED: Chronic | Status: ACTIVE | Noted: 2018-12-28

## 2019-01-02 PROBLEM — M71.30 OTHER BURSAL CYST, UNSPECIFIED SITE: Chronic | Status: ACTIVE | Noted: 2018-12-28

## 2019-01-02 PROBLEM — G47.30 SLEEP APNEA, UNSPECIFIED: Chronic | Status: ACTIVE | Noted: 2018-12-28

## 2019-01-02 PROBLEM — K52.9 NONINFECTIVE GASTROENTERITIS AND COLITIS, UNSPECIFIED: Chronic | Status: ACTIVE | Noted: 2018-12-28

## 2019-01-02 PROBLEM — M85.80 OTHER SPECIFIED DISORDERS OF BONE DENSITY AND STRUCTURE, UNSPECIFIED SITE: Chronic | Status: ACTIVE | Noted: 2018-12-28

## 2019-01-07 ENCOUNTER — APPOINTMENT (OUTPATIENT)
Dept: FAMILY MEDICINE | Facility: CLINIC | Age: 64
End: 2019-01-07
Payer: COMMERCIAL

## 2019-01-07 ENCOUNTER — TRANSCRIPTION ENCOUNTER (OUTPATIENT)
Age: 64
End: 2019-01-07

## 2019-01-07 VITALS
HEART RATE: 65 BPM | BODY MASS INDEX: 23.92 KG/M2 | DIASTOLIC BLOOD PRESSURE: 86 MMHG | OXYGEN SATURATION: 95 % | HEIGHT: 62 IN | SYSTOLIC BLOOD PRESSURE: 132 MMHG | WEIGHT: 130 LBS

## 2019-01-07 DIAGNOSIS — Z87.2 PERSONAL HISTORY OF DISEASES OF THE SKIN AND SUBCUTANEOUS TISSUE: ICD-10-CM

## 2019-01-07 DIAGNOSIS — Z01.818 ENCOUNTER FOR OTHER PREPROCEDURAL EXAMINATION: ICD-10-CM

## 2019-01-07 DIAGNOSIS — M19.042 PRIMARY OSTEOARTHRITIS, RIGHT HAND: ICD-10-CM

## 2019-01-07 DIAGNOSIS — Z88.9 ALLERGY STATUS TO UNSPECIFIED DRUGS, MEDICAMENTS AND BIOLOGICAL SUBSTANCES: ICD-10-CM

## 2019-01-07 DIAGNOSIS — Z92.29 PERSONAL HISTORY OF OTHER DRUG THERAPY: ICD-10-CM

## 2019-01-07 DIAGNOSIS — R79.89 OTHER SPECIFIED ABNORMAL FINDINGS OF BLOOD CHEMISTRY: ICD-10-CM

## 2019-01-07 DIAGNOSIS — M19.041 PRIMARY OSTEOARTHRITIS, RIGHT HAND: ICD-10-CM

## 2019-01-07 PROCEDURE — 99215 OFFICE O/P EST HI 40 MIN: CPT

## 2019-01-07 NOTE — HISTORY OF PRESENT ILLNESS
[No Pertinent Cardiac History] : no history of aortic stenosis, atrial fibrillation, coronary artery disease, recent myocardial infarction, or implantable device/pacemaker [No Pertinent Pulmonary History] : no history of asthma, COPD, sleep apnea, or smoking [Sleep Apnea] : sleep apnea [No Adverse Anesthesia Reaction] : no adverse anesthesia reaction in self or family member [(Patient denies any chest pain, claudication, dyspnea on exertion, orthopnea, palpitations or syncope)] : Patient denies any chest pain, claudication, dyspnea on exertion, orthopnea, palpitations or syncope [Aortic Stenosis] : no aortic stenosis [Atrial Fibrillation] : no atrial fibrillation [Coronary Artery Disease] : no coronary artery disease [Recent Myocardial Infarction] : no recent myocardial infarction [Implantable Device/Pacemaker] : no implantable device/pacemaker [Asthma] : no asthma [COPD] : no COPD [Smoker] : not a smoker [Family Member] : no family member with adverse anesthesia reaction/sudden death [Self] : no previous adverse anesthesia reaction [Chronic Anticoagulation] : no chronic anticoagulation [Chronic Kidney Disease] : no chronic kidney disease [Diabetes] : no diabetes [FreeTextEntry1] : RIGHT INDEX FINGER MUCOID CYST EXCISION AND DEBRIDEMENT [FreeTextEntry2] : JANUARY 8, 2019 [FreeTextEntry3] : dr. Corona [FreeTextEntry4] : PRESENTING FOR MEDICAL CLEARANCE FOR UPCOMING SURGERY OF RIGHT INDEX FINGER FOR MUCOID CYST.  ALSO OSTEOARTHRITIS OF HANDS.  HAD IMAGING WITH HAND SURGEON.  CAUSES DISCOMFORT AT TIMES.  IS FEELING WELL TODAY.  NO HISTORY OF MI, STROKE OR SEIZURE.  NO PERSONAL HISTORY OF BLOOD DISORDERS.  DENIES EASY BLEEDING OR BRUISING.  IS ABLE TO WALK MULTIPLE BLOCKS AND GO UP MULTIPLE FLIGHTS OF STAIRS WITHOUT DIFFICULTY.   HAS HAD NO HEADACHE, DIZZINESS, CHEST PAIN, SHORTNESS OF BREATH, GI OR URINARY COMPLAINTS.  CHRONIC HISTORY OF COLITIS, OSTEOPOROSIS AND THYROID NODULE. ALSO HISTORY OF ANXIETY ON PAROXETINE.  HISTORY OF SYLVIA - NOT USING CPAP.  NO OTHER COMPLAINTS TODAY.  EXERCISING DAILY - WEIGHTS, PILATES, CARDIO DANCE.

## 2019-01-07 NOTE — PLAN
[FreeTextEntry1] : OPTIMIZED MEDICALLY FOR PROPOSED SURGICAL PROCEDURE \par PRE-OPERATIVE TESTING REVIEWED\par EKG: NSR AT 61 BPM, NO ACUTE ST-T WAVE CHANGES\par EXCELLENT EXERCISE CAPACITY\par GI/DVT PROPHYLAXIS PER SURGERY\par AVOIDANCE OF NSAIDS, VITAMINS AND ASPIRIN CONTAINING PRODUCTS PRIOR TO SURGERY\par CALL WITH ANY QUESTIONS, CONCERNS OR CHANGES PRIOR TO PROCEDURE\par SUPPORT PROVIDED\par ADDITIONAL LAB WORK ORDERED FOR POST PROCEDURE\par FOLLOW-UP POST-OPERATIVELY

## 2019-01-07 NOTE — PHYSICAL EXAM
[No Acute Distress] : no acute distress [Well Nourished] : well nourished [Well-Appearing] : well-appearing [Normal Sclera/Conjunctiva] : normal sclera/conjunctiva [PERRL] : pupils equal round and reactive to light [Normal Outer Ear/Nose] : the outer ears and nose were normal in appearance [Normal Oropharynx] : the oropharynx was normal [Normal TMs] : both tympanic membranes were normal [Supple] : supple [No Lymphadenopathy] : no lymphadenopathy [No Respiratory Distress] : no respiratory distress  [Clear to Auscultation] : lungs were clear to auscultation bilaterally [No Accessory Muscle Use] : no accessory muscle use [Normal Rate] : normal rate  [Regular Rhythm] : with a regular rhythm [Normal S1, S2] : normal S1 and S2 [Soft] : abdomen soft [Non Tender] : non-tender [Normal Bowel Sounds] : normal bowel sounds [No Joint Swelling] : no joint swelling [No Rash] : no rash [Normal Gait] : normal gait [Coordination Grossly Intact] : coordination grossly intact [No Focal Deficits] : no focal deficits [Speech Grossly Normal] : speech grossly normal [Normal Mood] : the mood was normal [Normal Insight/Judgement] : insight and judgment were intact [de-identified] : chronic arthritic changes to hands, right index finger with skin lump at nail base

## 2019-01-08 ENCOUNTER — APPOINTMENT (OUTPATIENT)
Dept: ORTHOPEDIC SURGERY | Facility: HOSPITAL | Age: 64
End: 2019-01-08
Payer: COMMERCIAL

## 2019-01-08 ENCOUNTER — RESULT REVIEW (OUTPATIENT)
Age: 64
End: 2019-01-08

## 2019-01-08 ENCOUNTER — OUTPATIENT (OUTPATIENT)
Dept: INPATIENT UNIT | Facility: HOSPITAL | Age: 64
LOS: 1 days | End: 2019-01-08
Payer: COMMERCIAL

## 2019-01-08 VITALS
RESPIRATION RATE: 16 BRPM | OXYGEN SATURATION: 95 % | SYSTOLIC BLOOD PRESSURE: 121 MMHG | DIASTOLIC BLOOD PRESSURE: 70 MMHG | HEART RATE: 80 BPM | TEMPERATURE: 98 F | WEIGHT: 125.66 LBS

## 2019-01-08 VITALS
OXYGEN SATURATION: 95 % | SYSTOLIC BLOOD PRESSURE: 126 MMHG | DIASTOLIC BLOOD PRESSURE: 68 MMHG | RESPIRATION RATE: 15 BRPM | HEART RATE: 78 BPM

## 2019-01-08 DIAGNOSIS — Z98.891 HISTORY OF UTERINE SCAR FROM PREVIOUS SURGERY: Chronic | ICD-10-CM

## 2019-01-08 DIAGNOSIS — K37 UNSPECIFIED APPENDICITIS: Chronic | ICD-10-CM

## 2019-01-08 DIAGNOSIS — M71.349 OTHER BURSAL CYST, UNSPECIFIED HAND: ICD-10-CM

## 2019-01-08 DIAGNOSIS — Z98.890 OTHER SPECIFIED POSTPROCEDURAL STATES: Chronic | ICD-10-CM

## 2019-01-08 LAB
APTT BLD: 30.7 SEC — SIGNIFICANT CHANGE UP (ref 27.5–36.3)
INR BLD: 0.88 RATIO — SIGNIFICANT CHANGE UP (ref 0.88–1.16)
PROTHROM AB SERPL-ACNC: 10.1 SEC — SIGNIFICANT CHANGE UP (ref 10–12.9)

## 2019-01-08 PROCEDURE — 88304 TISSUE EXAM BY PATHOLOGIST: CPT | Mod: 26

## 2019-01-08 PROCEDURE — 88304 TISSUE EXAM BY PATHOLOGIST: CPT

## 2019-01-08 PROCEDURE — 11044 DBRDMT BONE 1ST 20 SQ CM/<: CPT | Mod: RT,XU

## 2019-01-08 PROCEDURE — 26160 REMOVE TENDON SHEATH LESION: CPT | Mod: RT

## 2019-01-08 PROCEDURE — ZZZZZ: CPT

## 2019-01-08 PROCEDURE — 26110 BIOPSY FINGER JOINT LINING: CPT | Mod: 59,RT

## 2019-01-08 PROCEDURE — 85730 THROMBOPLASTIN TIME PARTIAL: CPT

## 2019-01-08 PROCEDURE — 85610 PROTHROMBIN TIME: CPT

## 2019-01-08 PROCEDURE — 26111 EXC HAND LES SC 1.5 CM/>: CPT | Mod: RT

## 2019-01-08 PROCEDURE — 36415 COLL VENOUS BLD VENIPUNCTURE: CPT

## 2019-01-08 RX ORDER — SODIUM CHLORIDE 9 MG/ML
1000 INJECTION, SOLUTION INTRAVENOUS
Qty: 0 | Refills: 0 | Status: DISCONTINUED | OUTPATIENT
Start: 2019-01-08 | End: 2019-01-08

## 2019-01-08 RX ORDER — ONDANSETRON 8 MG/1
4 TABLET, FILM COATED ORAL ONCE
Qty: 0 | Refills: 0 | Status: DISCONTINUED | OUTPATIENT
Start: 2019-01-08 | End: 2019-01-08

## 2019-01-08 RX ORDER — ASCORBIC ACID 60 MG
1 TABLET,CHEWABLE ORAL
Qty: 0 | Refills: 0 | COMMUNITY

## 2019-01-08 RX ORDER — SODIUM CHLORIDE 9 MG/ML
3 INJECTION INTRAMUSCULAR; INTRAVENOUS; SUBCUTANEOUS ONCE
Qty: 0 | Refills: 0 | Status: DISCONTINUED | OUTPATIENT
Start: 2019-01-08 | End: 2019-01-08

## 2019-01-08 RX ORDER — FENTANYL CITRATE 50 UG/ML
50 INJECTION INTRAVENOUS
Qty: 0 | Refills: 0 | Status: DISCONTINUED | OUTPATIENT
Start: 2019-01-08 | End: 2019-01-08

## 2019-01-08 RX ORDER — MULTIVIT WITH MIN/MFOLATE/K2 340-15/3 G
1 POWDER (GRAM) ORAL
Qty: 0 | Refills: 0 | COMMUNITY

## 2019-01-08 RX ORDER — BUDESONIDE, MICRONIZED 100 %
3 POWDER (GRAM) MISCELLANEOUS
Qty: 0 | Refills: 0 | COMMUNITY

## 2019-01-08 RX ORDER — SALIVA SUBSTITUTE COMB NO.11 351 MG
1 POWDER IN PACKET (EA) MUCOUS MEMBRANE
Qty: 0 | Refills: 0 | COMMUNITY

## 2019-01-08 NOTE — BRIEF OPERATIVE NOTE - ASSISTANT(S)
Be consistent with diet and vitamin K foods. Call with any problems, bleeding concerns or medication changes. Follow Anticoagulation Dosing Card as discussed during your visit. Discussed bleeding with patient and explained if he has any bleeding to call and if he cannot stop bleeding to go to ER and he verbalized understanding.   The patient was also advised to present to the nearest medical facility for any sudden onset of shortness of breath, chest pain, redness and warmth, or swelling of any of the extremities.      Khoi El PA-C

## 2019-01-08 NOTE — ASU DISCHARGE PLAN (ADULT/PEDIATRIC). - MEDICATION SUMMARY - MEDICATIONS TO TAKE
I will START or STAY ON the medications listed below when I get home from the hospital:    budesonide 9 mg oral tablet, extended release  -- 3 tab(s) by mouth once a day (in the morning)  -- Indication: For Home med    Norco 5 mg-325 mg oral tablet  -- 1 tab(s) by mouth every 8 hours x 2 days MDD:3  -- Caution federal law prohibits the transfer of this drug to any person other  than the person for whom it was prescribed.  May cause drowsiness.  Alcohol may intensify this effect.  Use care when operating dangerous machinery.  This product contains acetaminophen.  Do not use  with any other product containing acetaminophen to prevent possible liver damage.  Using more of this medication than prescribed may cause serious breathing problems.    -- Indication: For Pain medication    PARoxetine 40 mg oral tablet  -- 1 tab(s) by mouth once a day  -- Indication: For Home med    magnesium citrate  -- 1 tab(s) by mouth once a day  -- Indication: For Home med    Biotene Oral Balance  -- 1 tab(s) by mouth once a day  -- Indication: For Home med    Vitamin B Complex 100  -- 1 tab(s) by mouth once a day  -- Indication: For Home med    Multi Vitamin+  -- 1 tab(s) by mouth once a day  -- Indication: For Home med    Calcium 500+D  -- 2000 milligram(s) by mouth once a day  -- Indication: For Home med    Vitamin C 500 mg oral tablet  -- 1 tab(s) by mouth once a day  -- Indication: For Home med

## 2019-01-08 NOTE — ASU DISCHARGE PLAN (ADULT/PEDIATRIC). - SPECIAL INSTRUCTIONS
Keep dressing clean and dry. Maintain dressing until follow up appointment with Dr. Corona in two weeks. Dressing will be removed at appointment. Sutures will be removed at that time as well. You can stagger Tylenol with the Pain medication for the next two days. Please take medication as prescribed.

## 2019-01-08 NOTE — BRIEF OPERATIVE NOTE - PROCEDURE
<<-----Click on this checkbox to enter Procedure Excision of osteophyte of right index finger  01/08/2019    Active  XQIU1  Excision of mucous cyst of finger of right hand  01/08/2019  right index finger  Active  XQIU1

## 2019-01-09 ENCOUNTER — MEDICATION RENEWAL (OUTPATIENT)
Age: 64
End: 2019-01-09

## 2019-01-14 LAB — SURGICAL PATHOLOGY STUDY: SIGNIFICANT CHANGE UP

## 2019-01-22 ENCOUNTER — APPOINTMENT (OUTPATIENT)
Dept: OBGYN | Facility: CLINIC | Age: 64
End: 2019-01-22
Payer: COMMERCIAL

## 2019-01-22 ENCOUNTER — APPOINTMENT (OUTPATIENT)
Dept: ORTHOPEDIC SURGERY | Facility: CLINIC | Age: 64
End: 2019-01-22
Payer: COMMERCIAL

## 2019-01-22 VITALS
BODY MASS INDEX: 24.29 KG/M2 | DIASTOLIC BLOOD PRESSURE: 92 MMHG | HEIGHT: 62 IN | HEART RATE: 72 BPM | WEIGHT: 132 LBS | SYSTOLIC BLOOD PRESSURE: 162 MMHG

## 2019-01-22 VITALS
DIASTOLIC BLOOD PRESSURE: 82 MMHG | WEIGHT: 132 LBS | SYSTOLIC BLOOD PRESSURE: 135 MMHG | HEIGHT: 62 IN | BODY MASS INDEX: 24.29 KG/M2

## 2019-01-22 DIAGNOSIS — M67.40 GANGLION, UNSPECIFIED SITE: ICD-10-CM

## 2019-01-22 PROCEDURE — 99024 POSTOP FOLLOW-UP VISIT: CPT

## 2019-01-22 PROCEDURE — 81003 URINALYSIS AUTO W/O SCOPE: CPT | Mod: NC,QW

## 2019-01-22 PROCEDURE — 99213 OFFICE O/P EST LOW 20 MIN: CPT

## 2019-01-25 LAB — BACTERIA UR CULT: ABNORMAL

## 2019-01-31 ENCOUNTER — APPOINTMENT (OUTPATIENT)
Dept: DERMATOLOGY | Facility: CLINIC | Age: 64
End: 2019-01-31
Payer: COMMERCIAL

## 2019-01-31 ENCOUNTER — APPOINTMENT (OUTPATIENT)
Dept: ORTHOPEDIC SURGERY | Facility: CLINIC | Age: 64
End: 2019-01-31

## 2019-01-31 DIAGNOSIS — C44.722 SQUAMOUS CELL CARCINOMA OF SKIN OF RIGHT LOWER LIMB, INCLUDING HIP: ICD-10-CM

## 2019-01-31 PROCEDURE — 17313 MOHS 1 STAGE T/A/L: CPT

## 2019-01-31 PROCEDURE — 12032 INTMD RPR S/A/T/EXT 2.6-7.5: CPT

## 2019-01-31 NOTE — HISTORY OF PRESENT ILLNESS
[FreeTextEntry1] : Mohs surgery for an SCC, well-differentiated, on the right pre-tibial shin [de-identified] : 01/31/2019 \par \par Ms. CHARLIE BEARD is a 63 year old F who presents for Mohs surgery for an SCC, well-differentiated, on the right pre-tibial shin.\par \par SH: Retired from the [employer]. Has 2 sons - 30 and 33 in [facility]. No grandchildren. 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 Mondragon

## 2019-01-31 NOTE — PHYSICAL EXAM
[Alert] : alert [Oriented x 3] : ~L oriented x 3 [Well Nourished] : well nourished [Conjunctiva Non-injected] : conjunctiva non-injected [No Visual Lymphadenopathy] : no visual  lymphadenopathy [No Clubbing] : no clubbing [No Edema] : no edema [No Bromhidrosis] : no bromhidrosis [No Chromhidrosis] : no chromhidrosis [FreeTextEntry3] : -- R pretibial shin with an atrophic bx site

## 2019-02-04 ENCOUNTER — RX RENEWAL (OUTPATIENT)
Age: 64
End: 2019-02-04

## 2019-02-06 ENCOUNTER — APPOINTMENT (OUTPATIENT)
Dept: DERMATOLOGY | Facility: CLINIC | Age: 64
End: 2019-02-06
Payer: COMMERCIAL

## 2019-02-06 PROCEDURE — 17000 DESTRUCT PREMALG LESION: CPT | Mod: 79

## 2019-02-18 LAB
FERRITIN SERPL-MCNC: 111 NG/ML
IRON SATN MFR SERPL: 30 %
IRON SERPL-MCNC: 97 UG/DL
TIBC SERPL-MCNC: 321 UG/DL
UIBC SERPL-MCNC: 224 UG/DL
VIT B12 SERPL-MCNC: 783 PG/ML

## 2019-02-19 ENCOUNTER — RESULT REVIEW (OUTPATIENT)
Age: 64
End: 2019-02-19

## 2019-02-19 LAB
BASOPHILS # BLD AUTO: 0.07 K/UL
BASOPHILS NFR BLD AUTO: 1 %
EOSINOPHIL # BLD AUTO: 0.29 K/UL
EOSINOPHIL NFR BLD AUTO: 4.2 %
HCT VFR BLD CALC: 37.8 %
HGB BLD-MCNC: 11.8 G/DL
IMM GRANULOCYTES NFR BLD AUTO: 0.3 %
LYMPHOCYTES # BLD AUTO: 3.03 K/UL
LYMPHOCYTES NFR BLD AUTO: 44.2 %
MAN DIFF?: NORMAL
MCHC RBC-ENTMCNC: 31.2 GM/DL
MCHC RBC-ENTMCNC: 32 PG
MCV RBC AUTO: 102.4 FL
MONOCYTES # BLD AUTO: 0.41 K/UL
MONOCYTES NFR BLD AUTO: 6 %
NEUTROPHILS # BLD AUTO: 3.04 K/UL
NEUTROPHILS NFR BLD AUTO: 44.3 %
PLATELET # BLD AUTO: 376 K/UL
RBC # BLD: 3.69 M/UL
RBC # FLD: 14 %
WBC # FLD AUTO: 6.86 K/UL

## 2019-03-11 ENCOUNTER — APPOINTMENT (OUTPATIENT)
Dept: DERMATOLOGY | Facility: CLINIC | Age: 64
End: 2019-03-11

## 2019-04-01 ENCOUNTER — APPOINTMENT (OUTPATIENT)
Dept: DERMATOLOGY | Facility: CLINIC | Age: 64
End: 2019-04-01
Payer: COMMERCIAL

## 2019-04-01 ENCOUNTER — RESULT REVIEW (OUTPATIENT)
Age: 64
End: 2019-04-01

## 2019-04-01 PROCEDURE — 11102 TANGNTL BX SKIN SINGLE LES: CPT | Mod: 59

## 2019-04-01 PROCEDURE — 17110 DESTRUCTION B9 LES UP TO 14: CPT | Mod: 59

## 2019-04-01 PROCEDURE — 17004 DESTROY PREMAL LESIONS 15/>: CPT

## 2019-04-01 PROCEDURE — 99214 OFFICE O/P EST MOD 30 MIN: CPT | Mod: 25

## 2019-04-06 ENCOUNTER — TRANSCRIPTION ENCOUNTER (OUTPATIENT)
Age: 64
End: 2019-04-06

## 2019-04-11 ENCOUNTER — TRANSCRIPTION ENCOUNTER (OUTPATIENT)
Age: 64
End: 2019-04-11

## 2019-04-18 ENCOUNTER — APPOINTMENT (OUTPATIENT)
Dept: ORTHOPEDIC SURGERY | Facility: CLINIC | Age: 64
End: 2019-04-18
Payer: COMMERCIAL

## 2019-04-18 VITALS
SYSTOLIC BLOOD PRESSURE: 137 MMHG | DIASTOLIC BLOOD PRESSURE: 87 MMHG | HEART RATE: 71 BPM | HEIGHT: 62 IN | BODY MASS INDEX: 24.29 KG/M2 | WEIGHT: 132 LBS

## 2019-04-18 DIAGNOSIS — M75.20 BICIPITAL TENDINITIS, UNSPECIFIED SHOULDER: ICD-10-CM

## 2019-04-18 PROCEDURE — 99214 OFFICE O/P EST MOD 30 MIN: CPT

## 2019-04-18 PROCEDURE — 73030 X-RAY EXAM OF SHOULDER: CPT | Mod: RT

## 2019-04-18 NOTE — DISCUSSION/SUMMARY
[de-identified] : Discussion had with patient \par Patient will follow up with Trasolini\par Patient aware must follow up with MD for further eval and treatment \par Patient will start Physical Therapy \par Patients questions were answered and patient is satisfied with today's visit\par

## 2019-04-18 NOTE — PHYSICAL EXAM
[UE/LE] : Sensory: Intact in bilateral upper & lower extremities [ALL] : dorsalis pedis, posterior tibial, femoral, popliteal, and radial 2+ and symmetric bilaterally [Normal] : Oriented to person, place, and time, insight and judgement were intact and the affect was normal [Poor Appearance] : well-appearing [Acute Distress] : not in acute distress [de-identified] : Skin Warm, dry, no erythema, no warmth, no swelling, no lesions\par \par Tenderness - over bicep tendonitis \par \par ROM \par Flexion -180 degrees\par Abduction 180 degrees\par External Rotation 90 degrees\par \par Neer Test - Negative \par Hawthorne Test - Negative\par Cross Body Adduction Test - Negative \par Speed Test - Negative \par Yergason Sign - Negative \par O'briens Test - Negative \par Supraspinatus Stress Test - pain\par Drop Arm Test - Negative \par External Rotation Strength - Negative \par Lift - Off Test Negative \par \par Sensation to touch intact to lateral to axillary and musculocutaneous nerve, distal motor function intact to elbow, wrist and hand \par pulse intact, cap refill intact\par  [de-identified] : 3 view right shoulder reveals no acute findings

## 2019-04-18 NOTE — PHYSICAL EXAM
[UE/LE] : Sensory: Intact in bilateral upper & lower extremities [ALL] : dorsalis pedis, posterior tibial, femoral, popliteal, and radial 2+ and symmetric bilaterally [Normal] : Oriented to person, place, and time, insight and judgement were intact and the affect was normal [Poor Appearance] : well-appearing [Acute Distress] : not in acute distress [de-identified] : Skin Warm, dry, no erythema, no warmth, no swelling, no lesions\par \par Tenderness - over bicep tendonitis \par \par ROM \par Flexion -180 degrees\par Abduction 180 degrees\par External Rotation 90 degrees\par \par Neer Test - Negative \par Hawthorne Test - Negative\par Cross Body Adduction Test - Negative \par Speed Test - Negative \par Yergason Sign - Negative \par O'briens Test - Negative \par Supraspinatus Stress Test - pain\par Drop Arm Test - Negative \par External Rotation Strength - Negative \par Lift - Off Test Negative \par \par Sensation to touch intact to lateral to axillary and musculocutaneous nerve, distal motor function intact to elbow, wrist and hand \par pulse intact, cap refill intact\par  [de-identified] : 3 view right shoulder reveals no acute findings

## 2019-04-18 NOTE — HISTORY OF PRESENT ILLNESS
[Pain Location] : pain [de-identified] : Patient is a 63 year old female who presents c/o right shoulder pain x 6-8 weeks, no recalled trauma however patient does do weight training 4 times a week.  patient states using CBD rub with some relief as well as Ibuprofen.  patient has not had any other treatments.  no radiation of pain, no weakness. patient locates pain to anterior shoulder

## 2019-04-18 NOTE — DISCUSSION/SUMMARY
[de-identified] : Discussion had with patient \par Patient will follow up with Trasolini\par Patient aware must follow up with MD for further eval and treatment \par Patient will start Physical Therapy \par Patients questions were answered and patient is satisfied with today's visit\par

## 2019-04-18 NOTE — HISTORY OF PRESENT ILLNESS
[Pain Location] : pain [de-identified] : Patient is a 63 year old female who presents c/o right shoulder pain x 6-8 weeks, no recalled trauma however patient does do weight training 4 times a week.  patient states using CBD rub with some relief as well as Ibuprofen.  patient has not had any other treatments.  no radiation of pain, no weakness. patient locates pain to anterior shoulder

## 2019-04-23 PROBLEM — M75.20 BICIPITAL TENDINITIS: Status: ACTIVE | Noted: 2019-04-18

## 2019-04-26 ENCOUNTER — OUTPATIENT (OUTPATIENT)
Dept: OUTPATIENT SERVICES | Facility: HOSPITAL | Age: 64
LOS: 1 days | End: 2019-04-26
Payer: COMMERCIAL

## 2019-04-26 DIAGNOSIS — Z98.890 OTHER SPECIFIED POSTPROCEDURAL STATES: Chronic | ICD-10-CM

## 2019-04-26 DIAGNOSIS — Z98.891 HISTORY OF UTERINE SCAR FROM PREVIOUS SURGERY: Chronic | ICD-10-CM

## 2019-04-26 DIAGNOSIS — Z51.89 ENCOUNTER FOR OTHER SPECIFIED AFTERCARE: ICD-10-CM

## 2019-04-26 DIAGNOSIS — M75.20 BICIPITAL TENDINITIS, UNSPECIFIED SHOULDER: ICD-10-CM

## 2019-04-26 DIAGNOSIS — K37 UNSPECIFIED APPENDICITIS: Chronic | ICD-10-CM

## 2019-05-08 ENCOUNTER — RX RENEWAL (OUTPATIENT)
Age: 64
End: 2019-05-08

## 2019-05-08 ENCOUNTER — TRANSCRIPTION ENCOUNTER (OUTPATIENT)
Age: 64
End: 2019-05-08

## 2019-05-13 ENCOUNTER — RX RENEWAL (OUTPATIENT)
Age: 64
End: 2019-05-13

## 2019-05-16 PROCEDURE — 97140 MANUAL THERAPY 1/> REGIONS: CPT

## 2019-05-16 PROCEDURE — 97010 HOT OR COLD PACKS THERAPY: CPT

## 2019-05-16 PROCEDURE — 97110 THERAPEUTIC EXERCISES: CPT

## 2019-05-16 PROCEDURE — 97162 PT EVAL MOD COMPLEX 30 MIN: CPT

## 2019-05-23 ENCOUNTER — LABORATORY RESULT (OUTPATIENT)
Age: 64
End: 2019-05-23

## 2019-05-23 ENCOUNTER — APPOINTMENT (OUTPATIENT)
Dept: DERMATOLOGY | Facility: CLINIC | Age: 64
End: 2019-05-23
Payer: COMMERCIAL

## 2019-05-23 DIAGNOSIS — L57.0 ACTINIC KERATOSIS: ICD-10-CM

## 2019-05-23 DIAGNOSIS — D48.5 NEOPLASM OF UNCERTAIN BEHAVIOR OF SKIN: ICD-10-CM

## 2019-05-23 PROCEDURE — 17000 DESTRUCT PREMALG LESION: CPT | Mod: 59

## 2019-05-23 PROCEDURE — 11102 TANGNTL BX SKIN SINGLE LES: CPT

## 2019-05-29 ENCOUNTER — APPOINTMENT (OUTPATIENT)
Dept: FAMILY MEDICINE | Facility: CLINIC | Age: 64
End: 2019-05-29
Payer: COMMERCIAL

## 2019-05-29 VITALS
WEIGHT: 132 LBS | HEIGHT: 60 IN | DIASTOLIC BLOOD PRESSURE: 84 MMHG | HEART RATE: 72 BPM | SYSTOLIC BLOOD PRESSURE: 140 MMHG | BODY MASS INDEX: 25.91 KG/M2

## 2019-05-29 PROCEDURE — 99213 OFFICE O/P EST LOW 20 MIN: CPT | Mod: 25

## 2019-05-29 RX ORDER — CEPHALEXIN 500 MG/1
500 TABLET ORAL 3 TIMES DAILY
Qty: 15 | Refills: 0 | Status: DISCONTINUED | COMMUNITY
Start: 2019-01-23 | End: 2019-05-29

## 2019-05-29 RX ORDER — CEPHALEXIN 500 MG/1
500 CAPSULE ORAL EVERY 8 HOURS
Qty: 9 | Refills: 0 | Status: DISCONTINUED | COMMUNITY
Start: 2019-01-22 | End: 2019-05-29

## 2019-06-01 NOTE — ADDENDUM
[FreeTextEntry1] : I, Radha Alfred, acted solely as a scribe for Dr. Sahhana Dillon on this date 5/29/19.

## 2019-06-01 NOTE — PLAN
[FreeTextEntry1] : CONSIDER SHINGRIX\par DOING WELL ON PAROXETINE - TO CONTINUE\par STRESS REDUCTION\par CONTINUE ALL MEDICATIONS AS DIRECTED \par FOLLOW UP WITH ALL SPECIALTIES AS DIRECTED INCLUDING GYN\par CALL WITH ANY QUESTIONS, CONCERNS, OR CHANGES

## 2019-06-01 NOTE — PHYSICAL EXAM
[No Acute Distress] : no acute distress [Well Developed] : well developed [Well-Appearing] : well-appearing [No Respiratory Distress] : no respiratory distress  [Clear to Auscultation] : lungs were clear to auscultation bilaterally [Normal Rate] : normal rate  [Regular Rhythm] : with a regular rhythm [No Accessory Muscle Use] : no accessory muscle use [Pedal Pulses Present] : the pedal pulses are present [No Murmur] : no murmur heard [Normal S1, S2] : normal S1 and S2 [No Edema] : there was no peripheral edema [Speech Grossly Normal] : speech grossly normal [Normal Affect] : the affect was normal [Normal Mood] : the mood was normal [Normal Insight/Judgement] : insight and judgment were intact

## 2019-06-01 NOTE — HISTORY OF PRESENT ILLNESS
[FreeTextEntry1] : F/U ANXIETY [de-identified] : MS. BEARD IS A PLEASANT 63 YO PRESENTING FOR FOLLOW UP. CHRONIC HISTORY OF ANXIETY.  DOING WELL ON PAROXETINE. NOT FEELING DOWN OR DEPRESSED. NO HEADACHES. SLEEPING WELL. APPETITE IS GOOD. GETTING ALONG WITH OTHERS  SAW DERMATOLOGY. DUE TO SEE GYN. HAS HAD NO CHEST PAIN, SHORTNESS OF BREATH, GI OR URINARY COMPLAINTS. NO OTHER COMPLAINTS TODAY.\par \par DERMATOLOGY: DR. DEJESUS\par GYN: DR. HARRIS\par GI: DR. JONES\par ORTHOPEDICS: DR. RODRIGUEZ\par ENDOCRINOLOGY: DR. PERLMAN

## 2019-06-24 ENCOUNTER — APPOINTMENT (OUTPATIENT)
Dept: FAMILY MEDICINE | Facility: CLINIC | Age: 64
End: 2019-06-24
Payer: COMMERCIAL

## 2019-06-24 ENCOUNTER — LABORATORY RESULT (OUTPATIENT)
Age: 64
End: 2019-06-24

## 2019-06-24 ENCOUNTER — OTHER (OUTPATIENT)
Age: 64
End: 2019-06-24

## 2019-06-24 VITALS
BODY MASS INDEX: 25.52 KG/M2 | SYSTOLIC BLOOD PRESSURE: 130 MMHG | HEART RATE: 74 BPM | DIASTOLIC BLOOD PRESSURE: 78 MMHG | WEIGHT: 130 LBS | HEIGHT: 60 IN
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
## 2019-06-24 PROCEDURE — 36415 COLL VENOUS BLD VENIPUNCTURE: CPT

## 2019-06-24 PROCEDURE — 99213 OFFICE O/P EST LOW 20 MIN: CPT | Mod: 25

## 2019-06-25 LAB — B BURGDOR IGG+IGM SER QL IB: NORMAL
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
## 2019-06-26 NOTE — PHYSICAL EXAM
[No Acute Distress] : no acute distress [Well Nourished] : well nourished [Well-Appearing] : well-appearing [No Rash] : no rash [Clear to Auscultation] : lungs were clear to auscultation bilaterally [Normal Rate] : normal rate  [No Accessory Muscle Use] : no accessory muscle use [Regular Rhythm] : with a regular rhythm [Normal S1, S2] : normal S1 and S2

## 2019-06-26 NOTE — PLAN
[FreeTextEntry1] : CHECK BLOOD WORK FOR LYME NOW AND REPEAT IN A FEW WEEK\par TICK AVOIDANCE AND PROTECTION\par WILL DEFER ANTIBIOTIC PROPHYLAXIS AS WILL BE IN THE SUN AND WOULD LIKE TO AVOID\par FOLLOW UP WITH ALL SPECIALISTS AS DIRECTED \par CALL WITH ANY QUESTIONS, CONCERNS OR CHANGES

## 2019-06-26 NOTE — HISTORY OF PRESENT ILLNESS
[FreeTextEntry1] : TICK BITE  [de-identified] : MS. BEARD IS A PLEASANT 63 YO PRESENTING FOR ACUTE VISIT. REPORTEDLY DISCOVERED TICK ON RIGHT SHOULDER 2 DAYS AGO THAT SHE COMPLETELY REMOVED.  IS UNSURE HOW LONG IT WAS ON HER. DENIES FEVER, ACHES OR RASH. NO KNOWN PRIOR TICK BITE. IS FEELING WELL TODAY.  DOES NOTE SHE SAW DERMATOLOGY. WILL HAVE MOHS PROCEDURE ON RIGHT SHIN. HAS HAD NO CHEST PAIN, SHORTNESS OF BREATH, HEADACHE, DIZZINESS, GI OR URINARY COMPLAINTS. NO OTHER COMPLAINTS TODAY.

## 2019-08-06 ENCOUNTER — TRANSCRIPTION ENCOUNTER (OUTPATIENT)
Age: 64
End: 2019-08-06

## 2019-08-14 ENCOUNTER — APPOINTMENT (OUTPATIENT)
Dept: FAMILY MEDICINE | Facility: CLINIC | Age: 64
End: 2019-08-14
Payer: COMMERCIAL

## 2019-08-14 ENCOUNTER — LABORATORY RESULT (OUTPATIENT)
Age: 64
End: 2019-08-14

## 2019-08-14 VITALS
HEART RATE: 76 BPM | HEIGHT: 60 IN | WEIGHT: 124 LBS | BODY MASS INDEX: 24.35 KG/M2 | SYSTOLIC BLOOD PRESSURE: 130 MMHG | DIASTOLIC BLOOD PRESSURE: 76 MMHG

## 2019-08-14 DIAGNOSIS — K14.9 DISEASE OF TONGUE, UNSPECIFIED: ICD-10-CM

## 2019-08-14 DIAGNOSIS — B00.1 HERPESVIRAL VESICULAR DERMATITIS: ICD-10-CM

## 2019-08-14 PROCEDURE — 36415 COLL VENOUS BLD VENIPUNCTURE: CPT

## 2019-08-14 PROCEDURE — 99213 OFFICE O/P EST LOW 20 MIN: CPT | Mod: 25

## 2019-08-15 ENCOUNTER — APPOINTMENT (OUTPATIENT)
Dept: DERMATOLOGY | Facility: CLINIC | Age: 64
End: 2019-08-15
Payer: COMMERCIAL

## 2019-08-15 DIAGNOSIS — C44.722 SQUAMOUS CELL CARCINOMA OF SKIN OF RIGHT LOWER LIMB, INCLUDING HIP: ICD-10-CM

## 2019-08-15 LAB
B BURGDOR IGG+IGM SER QL IB: NORMAL
HSV 1+2 IGG SER IA-IMP: NEGATIVE
HSV 1+2 IGG SER IA-IMP: NEGATIVE
HSV1 IGG SER QL: 0.05 INDEX
HSV2 IGG SER QL: 0.11 INDEX

## 2019-08-15 PROCEDURE — 17262 DSTRJ MAL LES T/A/L 1.1-2.0: CPT

## 2019-08-18 PROBLEM — K14.9 TONGUE IRRITATION: Status: ACTIVE | Noted: 2019-08-18

## 2019-08-18 NOTE — PLAN
[FreeTextEntry1] : CHECK LAB WORK INCLUDING HSV\par RX VALTREX - PENDING LAB RESULTS; REVIEWED POTENTIAL USE FOR FUTURE \par SALINE GARGLE\par DENTIST FOLLOW-UP\par SEEING DERMATOLOGY TOMORROW\par CALL WITH ANY QUESTIONS, CONCERNS OR CHANGES.

## 2019-08-18 NOTE — HISTORY OF PRESENT ILLNESS
[FreeTextEntry8] : MS. BEARD IS A PLEASANT 63 YO PRESENTING FOR ACUTE VISIT. COMPLAINS OF WAXING AND WANING PAIN IN MOUTH WHICH IS WORSE UNDER THE TONGUE. SYMPTOMS INITIALLY PRESENTED 3 DAYS AGO. PAIN WORSENS THROUGH OUT THE DAY WITH EATING. REPORTS HAVING MULTIPLE EPISODES OF SIMILAR SYMPTOMS EXACERBATED BY STRESS IN THE PAST. LAST EPISODE WAS YEARS AGO. REPORTS FAMILY HISTORY OF HSV-1. NO NEW TOOTHPASTE OR MOUTH WASH. DENIES DYSPHAGIA OR SORE THROAT TODAY. NO SOB, CONGESTION OR FEVER. NO DIFFICULTY SWALLOWING.  HAS MILD LEFT EAR PAIN. REPORTEDLY DIAGNOSED WITH [facility]. GIVEN AMOXICILLIN. DENIES WHITE COATING ON TONGUE. SAW DENTIST 1 WEEK AGO. TAKING VITAMIN B12 SUPPLEMENTS. HAS HAD NO CHEST PAIN, SHORTNESS OF BREATH, HEADACHE, DIZZINESS, GI OR URINARY COMPLAINTS. NO OTHER COMPLAINTS TODAY. PRIOR TICK BITE - DUE FOR REPEAT LYME TESTING.

## 2019-08-18 NOTE — PHYSICAL EXAM
[No Acute Distress] : no acute distress [Well Nourished] : well nourished [Well-Appearing] : well-appearing [Normal Outer Ear/Nose] : the outer ears and nose were normal in appearance [Normal TMs] : both tympanic membranes were normal [Normal Oropharynx] : the oropharynx was normal [No Lymphadenopathy] : no lymphadenopathy [Supple] : supple [No Respiratory Distress] : no respiratory distress  [No Accessory Muscle Use] : no accessory muscle use [Clear to Auscultation] : lungs were clear to auscultation bilaterally [Normal Rate] : normal rate  [Regular Rhythm] : with a regular rhythm [Normal S1, S2] : normal S1 and S2 [No Murmur] : no murmur heard [de-identified] : RIGHT SIDE OF TONGUE VERY SMALL AREA OF IRRITATION NEAR WHERE TOOTH MAY CONTACT, IRRITATION TO LEFT LIP

## 2019-08-18 NOTE — REVIEW OF SYSTEMS
[Fever] : no fever [Chills] : no chills [Fatigue] : no fatigue [Earache] : no earache [Nasal Discharge] : no nasal discharge [Sore Throat] : no sore throat [Chest Pain] : no chest pain [Palpitations] : no palpitations [Lower Ext Edema] : no lower extremity edema [Shortness Of Breath] : no shortness of breath [Wheezing] : no wheezing [Cough] : no cough [FreeTextEntry4] : SEE HPI

## 2019-08-20 ENCOUNTER — LABORATORY RESULT (OUTPATIENT)
Age: 64
End: 2019-08-20

## 2019-08-20 LAB
HSV1 IGM SER QL: NORMAL TITER
HSV2 AB FLD-ACNC: NORMAL TITER

## 2019-08-23 ENCOUNTER — TRANSCRIPTION ENCOUNTER (OUTPATIENT)
Age: 64
End: 2019-08-23

## 2019-08-27 LAB — B BURGDOR IGG+IGM SER QL IB: NORMAL

## 2019-08-28 LAB

## 2019-09-03 LAB

## 2019-09-09 ENCOUNTER — RX RENEWAL (OUTPATIENT)
Age: 64
End: 2019-09-09

## 2019-09-10 ENCOUNTER — APPOINTMENT (OUTPATIENT)
Dept: GASTROENTEROLOGY | Facility: CLINIC | Age: 64
End: 2019-09-10
Payer: COMMERCIAL

## 2019-09-10 VITALS
HEART RATE: 84 BPM | RESPIRATION RATE: 16 BRPM | SYSTOLIC BLOOD PRESSURE: 141 MMHG | HEIGHT: 60 IN | BODY MASS INDEX: 24.54 KG/M2 | WEIGHT: 125 LBS | OXYGEN SATURATION: 99 % | DIASTOLIC BLOOD PRESSURE: 95 MMHG

## 2019-09-10 PROCEDURE — 99214 OFFICE O/P EST MOD 30 MIN: CPT

## 2019-09-10 NOTE — PHYSICAL EXAM
[General Appearance - Alert] : alert [General Appearance - In No Acute Distress] : in no acute distress [Sclera] : the sclera and conjunctiva were normal [Extraocular Movements] : extraocular movements were intact [PERRL With Normal Accommodation] : pupils were equal in size, round, and reactive to light [Neck Cervical Mass (___cm)] : no neck mass was observed [Neck Appearance] : the appearance of the neck was normal [Jugular Venous Distention Increased] : there was no jugular-venous distention [Thyroid Diffuse Enlargement] : the thyroid was not enlarged [Thyroid Nodule] : there were no palpable thyroid nodules [Auscultation Breath Sounds / Voice Sounds] : lungs were clear to auscultation bilaterally [Heart Sounds] : normal S1 and S2 [Heart Rate And Rhythm] : heart rate was normal and rhythm regular [Murmurs] : no murmurs [Heart Sounds Gallop] : no gallops [Heart Sounds Pericardial Friction Rub] : no pericardial rub [Bowel Sounds] : normal bowel sounds [Abdomen Soft] : soft [Abdomen Tenderness] : non-tender [Abdomen Mass (___ Cm)] : no abdominal mass palpated [Musculoskeletal - Swelling] : no joint swelling seen [Skin Color & Pigmentation] : normal skin color and pigmentation [Skin Turgor] : normal skin turgor [] : no rash [Oriented To Time, Place, And Person] : oriented to person, place, and time [Impaired Insight] : insight and judgment were intact [Affect] : the affect was normal

## 2019-09-10 NOTE — HISTORY OF PRESENT ILLNESS
[de-identified] : patient history lymphocytic colitis . she's been treated with budesonide 9 mg today and had significant improvement. At this point she is stable with about 3 bowel movements per day sometimes soft but no abdominal pain or bleeding or other symptoms. She has reduced the medication to 2 pills every other day and has not seen a change. She would like to try to taper further.

## 2019-09-10 NOTE — ASSESSMENT
[FreeTextEntry1] : I discussed with the patient that she seems to be stable and a be reasonable to try to taper off the budesonide. I advised her to take one pill a day since this does not seem to be any different than when she was taking 3 pills per day. However she is havingsome mild increase in frequency of stoolsand I told her that Pepto-Bismol might help her advised to take it 3 times a week to see if this helps ameliorate her symptoms. She will call me in 2 weeks to let me she's doing.

## 2019-10-03 ENCOUNTER — RESULT REVIEW (OUTPATIENT)
Age: 64
End: 2019-10-03

## 2019-10-04 ENCOUNTER — MEDICATION RENEWAL (OUTPATIENT)
Age: 64
End: 2019-10-04

## 2019-10-04 ENCOUNTER — APPOINTMENT (OUTPATIENT)
Dept: DERMATOLOGY | Facility: CLINIC | Age: 64
End: 2019-10-04
Payer: COMMERCIAL

## 2019-10-04 PROCEDURE — 17261 DSTRJ MAL LES T/A/L .6-1.0CM: CPT

## 2019-10-04 PROCEDURE — 99213 OFFICE O/P EST LOW 20 MIN: CPT | Mod: 25

## 2019-10-10 ENCOUNTER — APPOINTMENT (OUTPATIENT)
Dept: OBGYN | Facility: CLINIC | Age: 64
End: 2019-10-10
Payer: COMMERCIAL

## 2019-10-10 VITALS
DIASTOLIC BLOOD PRESSURE: 77 MMHG | WEIGHT: 124 LBS | BODY MASS INDEX: 25 KG/M2 | SYSTOLIC BLOOD PRESSURE: 110 MMHG | HEIGHT: 59 IN

## 2019-10-10 LAB
BILIRUB UR QL STRIP: NORMAL
CLARITY UR: CLEAR
COLLECTION METHOD: NORMAL
GLUCOSE UR-MCNC: NORMAL
HCG UR QL: 0.2 EU/DL
HGB UR QL STRIP.AUTO: NORMAL
KETONES UR-MCNC: NORMAL
LEUKOCYTE ESTERASE UR QL STRIP: NORMAL
NITRITE UR QL STRIP: NORMAL
PH UR STRIP: 7
PROT UR STRIP-MCNC: NORMAL
SP GR UR STRIP: 1.01

## 2019-10-10 PROCEDURE — 99396 PREV VISIT EST AGE 40-64: CPT

## 2019-10-10 PROCEDURE — 99213 OFFICE O/P EST LOW 20 MIN: CPT | Mod: 25

## 2019-10-10 PROCEDURE — 81003 URINALYSIS AUTO W/O SCOPE: CPT | Mod: NC,QW

## 2019-10-10 NOTE — PHYSICAL EXAM
[Awake] : awake [Acute Distress] : no acute distress [Alert] : alert [Mass] : no breast mass [Nipple Discharge] : no nipple discharge [Axillary LAD] : no axillary lymphadenopathy [Soft] : soft [Oriented x3] : oriented to person, place, and time [Tender] : non tender [Normal] : uterus [No Bleeding] : there was no active vaginal bleeding [Uterine Adnexae] : were not tender and not enlarged [Occult Blood] : occult blood test from digital rectal exam was negative [RRR, No Murmurs] : RRR, no murmurs [CTAB] : CTAB

## 2019-10-11 LAB — HPV HIGH+LOW RISK DNA PNL CVX: NOT DETECTED

## 2019-10-14 LAB — BACTERIA UR CULT: ABNORMAL

## 2019-10-18 LAB — CYTOLOGY CVX/VAG DOC THIN PREP: ABNORMAL

## 2019-11-26 ENCOUNTER — TRANSCRIPTION ENCOUNTER (OUTPATIENT)
Age: 64
End: 2019-11-26

## 2019-11-27 ENCOUNTER — APPOINTMENT (OUTPATIENT)
Dept: OBGYN | Facility: CLINIC | Age: 64
End: 2019-11-27
Payer: COMMERCIAL

## 2019-11-27 VITALS
BODY MASS INDEX: 25 KG/M2 | SYSTOLIC BLOOD PRESSURE: 136 MMHG | DIASTOLIC BLOOD PRESSURE: 82 MMHG | WEIGHT: 124 LBS | HEIGHT: 59 IN

## 2019-11-27 PROCEDURE — 99214 OFFICE O/P EST MOD 30 MIN: CPT

## 2019-12-09 ENCOUNTER — RX RENEWAL (OUTPATIENT)
Age: 64
End: 2019-12-09

## 2019-12-16 ENCOUNTER — RX RENEWAL (OUTPATIENT)
Age: 64
End: 2019-12-16

## 2019-12-30 ENCOUNTER — RX RENEWAL (OUTPATIENT)
Age: 64
End: 2019-12-30

## 2020-01-03 ENCOUNTER — APPOINTMENT (OUTPATIENT)
Dept: FAMILY MEDICINE | Facility: CLINIC | Age: 65
End: 2020-01-03
Payer: COMMERCIAL

## 2020-01-03 VITALS
HEART RATE: 94 BPM | WEIGHT: 125 LBS | HEIGHT: 59 IN | BODY MASS INDEX: 25.2 KG/M2 | DIASTOLIC BLOOD PRESSURE: 78 MMHG | SYSTOLIC BLOOD PRESSURE: 130 MMHG

## 2020-01-03 PROCEDURE — 99214 OFFICE O/P EST MOD 30 MIN: CPT

## 2020-01-03 RX ORDER — SULFAMETHOXAZOLE AND TRIMETHOPRIM 800; 160 MG/1; MG/1
800-160 TABLET ORAL
Qty: 10 | Refills: 0 | Status: DISCONTINUED | COMMUNITY
Start: 2019-10-10 | End: 2020-01-03

## 2020-01-05 NOTE — PHYSICAL EXAM
[No Acute Distress] : no acute distress [Well Nourished] : well nourished [Well Developed] : well developed [Well-Appearing] : well-appearing [No Lymphadenopathy] : no lymphadenopathy [Supple] : supple [No Respiratory Distress] : no respiratory distress  [Clear to Auscultation] : lungs were clear to auscultation bilaterally [No Accessory Muscle Use] : no accessory muscle use [Normal Rate] : normal rate  [Normal S1, S2] : normal S1 and S2 [Regular Rhythm] : with a regular rhythm [No Murmur] : no murmur heard [No Edema] : there was no peripheral edema [Soft] : abdomen soft [Non Tender] : non-tender [No Masses] : no abdominal mass palpated [No HSM] : no HSM [Non-distended] : non-distended [Normal Insight/Judgement] : insight and judgment were intact [Normal Bowel Sounds] : normal bowel sounds [Normal Affect] : the affect was normal [Speech Grossly Normal] : speech grossly normal [No Joint Swelling] : no joint swelling [Grossly Normal Strength/Tone] : grossly normal strength/tone [Alert and Oriented x3] : oriented to person, place, and time

## 2020-01-05 NOTE — PLAN
[FreeTextEntry1] : WILL CONTINUE PAROXETINE\par STRESS REDUCTION\par CONSIDER THERAPY SESSIONS\par HEALTHY DIET AND LIFESTYLE MODIFICATIONS\par CHECK LAB WORK INCLUDING VITAMIN D LEVELS\par FOLLOW-UP ALL SPECIALISTS AS DIRECTED \par CALL WITH ANY QUESTIONS, CONCERNS OR CHANGES

## 2020-01-05 NOTE — REVIEW OF SYSTEMS
[Negative] : Heme/Lymph [Anxiety] : anxiety [Suicidal] : not suicidal [Depression] : no depression [de-identified] : SEE HPI

## 2020-01-05 NOTE — HISTORY OF PRESENT ILLNESS
[FreeTextEntry1] : F/U ANXIETY [de-identified] : MS. BEARD IS A PLEASANT 64 YEAR OLD PRESENTING FOR FOLLOW UP OF ANXIETY. CURRENTLY ON PAROXETINE DAILY AS DIRECTED.  NO SIDE EFFECTS FROM MEDICATION.  HAD BEEN FEELING GREAT ON MEDICATION.  SOME INCREASED STRESS AS OF LATE WITH HUSBANDS HEALTH AND HOLIDAYS.  A LITTLE MORE ANXIOUS WHICH HAS GIVEN HER TENSION HEADACHES.  THINKS THAT IT IS FLEETING AND WOULD LIKE TO CONTINUE CURRENT COURSE OF MEDICATION AS HAS HELPED OVERALL.  NOT FEELING DEPRESSED.  NO SUICIDAL/HOMICIDAL IDEATIONS OR PLANS.  HISTORY OF IMPAIRED FASTING GLUCOSE AND OSTEOPOROSIS.  ON ACTONEL FROM GYN.  TAKES CALCIUM AND VITAMIN D SUPPLEMENTS.  NO FALLS.  TRYING TO MAKE HEALTHY LIFESTYLE MODIFICATIONS.  NO OTHER COMPLAINTS TODAY.\par \par SPECIALISTS: \par DR. MAYERS - GYN\par DR. HUGHES - GI\par DR. SOSA - DERMATOLOGIST\par ENDOCRINOLOGIST

## 2020-01-05 NOTE — END OF VISIT
[FreeTextEntry3] : All medical record entries made by the Scribe were at my, Dr. Dillon's, direction and personally dictated by me on [1/3/2020]. I have reviewed the chart and agree that the record accurately reflects my personal performance of the history, physical exam, assessment and plan. I have also personally directed, reviewed and agreed with the chart.

## 2020-01-05 NOTE — ADDENDUM
[FreeTextEntry1] : I, Alise Moralez, acted solely as a scribe for Dr. Dillon on this date [1/3/2020].

## 2020-01-24 ENCOUNTER — TRANSCRIPTION ENCOUNTER (OUTPATIENT)
Age: 65
End: 2020-01-24

## 2020-03-23 ENCOUNTER — RX RENEWAL (OUTPATIENT)
Age: 65
End: 2020-03-23

## 2020-03-23 ENCOUNTER — TRANSCRIPTION ENCOUNTER (OUTPATIENT)
Age: 65
End: 2020-03-23

## 2020-03-23 LAB — HEMOCCULT STL QL IA: POSITIVE

## 2020-04-07 ENCOUNTER — TRANSCRIPTION ENCOUNTER (OUTPATIENT)
Age: 65
End: 2020-04-07

## 2020-05-07 LAB
25(OH)D3 SERPL-MCNC: 41 NG/ML
ALBUMIN SERPL ELPH-MCNC: 4.5 G/DL
ALP BLD-CCNC: 62 U/L
ALT SERPL-CCNC: 21 U/L
ANION GAP SERPL CALC-SCNC: 15 MMOL/L
AST SERPL-CCNC: 26 U/L
BASOPHILS # BLD AUTO: 0.09 K/UL
BASOPHILS NFR BLD AUTO: 1.2 %
BILIRUB SERPL-MCNC: 0.3 MG/DL
BUN SERPL-MCNC: 16 MG/DL
CALCIUM SERPL-MCNC: 9.4 MG/DL
CHLORIDE SERPL-SCNC: 107 MMOL/L
CHOLEST SERPL-MCNC: 198 MG/DL
CHOLEST/HDLC SERPL: 2.5 RATIO
CO2 SERPL-SCNC: 23 MMOL/L
CREAT SERPL-MCNC: 0.54 MG/DL
EOSINOPHIL # BLD AUTO: 0.39 K/UL
EOSINOPHIL NFR BLD AUTO: 5.3 %
ESTIMATED AVERAGE GLUCOSE: 117 MG/DL
GLUCOSE SERPL-MCNC: 92 MG/DL
HBA1C MFR BLD HPLC: 5.7 %
HCT VFR BLD CALC: 37.5 %
HDLC SERPL-MCNC: 80 MG/DL
HGB BLD-MCNC: 11.8 G/DL
IMM GRANULOCYTES NFR BLD AUTO: 0.1 %
LDLC SERPL CALC-MCNC: 85 MG/DL
LYMPHOCYTES # BLD AUTO: 3.14 K/UL
LYMPHOCYTES NFR BLD AUTO: 42.7 %
MAN DIFF?: NORMAL
MCHC RBC-ENTMCNC: 31.3 PG
MCHC RBC-ENTMCNC: 31.5 GM/DL
MCV RBC AUTO: 99.5 FL
MONOCYTES # BLD AUTO: 0.64 K/UL
MONOCYTES NFR BLD AUTO: 8.7 %
NEUTROPHILS # BLD AUTO: 3.08 K/UL
NEUTROPHILS NFR BLD AUTO: 42 %
PLATELET # BLD AUTO: 327 K/UL
POTASSIUM SERPL-SCNC: 4.7 MMOL/L
PROT SERPL-MCNC: 6.6 G/DL
RBC # BLD: 3.77 M/UL
RBC # FLD: 14.7 %
SODIUM SERPL-SCNC: 145 MMOL/L
TRIGL SERPL-MCNC: 169 MG/DL
WBC # FLD AUTO: 7.35 K/UL

## 2020-05-13 VITALS — WEIGHT: 125 LBS | HEIGHT: 59 IN | BODY MASS INDEX: 25.2 KG/M2

## 2020-05-13 NOTE — REASON FOR VISIT
[Annual Follow-Up] : an annual follow-up visit [Review of Eligibility] : review of eligibility [Virtual Visit] : virtual visit

## 2020-05-13 NOTE — HISTORY OF PRESENT ILLNESS
[TextBox_13] : Referred by Dr. Shahana Dillon.\par \par Ms. BEARD is a 65 year old female with a history of basal and squamous cell CA.\par \par She was called today to review eligibility for Low-Dose CT lung cancer screening.  Reviewed and confirmed that the patient meets screening eligibility criteria:\par \par 65 years old \par \par Smoking Status: Current Smoker\par \par Number of pack(s) per day: 1 ppd x 35 years (quit in  then restarted in 2018 at 1-2 cigarettes daily)\par Number of years smoked: 37\par Number of pack years smokin+\par \par Ms. BEARD denies any symptoms of lung cancer, including new cough, change in cough, hemoptysis, and unintentional weight loss.\par \par Ms. BEARD denies any personal history of lung cancer.  Admits to lung cancer in a first degree relative, her father.  Denies any history of lung disease or any history of occupational exposures.

## 2020-05-15 ENCOUNTER — APPOINTMENT (OUTPATIENT)
Dept: FAMILY MEDICINE | Facility: CLINIC | Age: 65
End: 2020-05-15
Payer: MEDICARE

## 2020-05-15 VITALS — BODY MASS INDEX: 25.3 KG/M2 | HEIGHT: 59 IN | WEIGHT: 125.5 LBS

## 2020-05-15 DIAGNOSIS — R73.01 IMPAIRED FASTING GLUCOSE: ICD-10-CM

## 2020-05-15 DIAGNOSIS — N39.0 URINARY TRACT INFECTION, SITE NOT SPECIFIED: ICD-10-CM

## 2020-05-15 PROCEDURE — 99406 BEHAV CHNG SMOKING 3-10 MIN: CPT | Mod: 95

## 2020-05-15 PROCEDURE — 99214 OFFICE O/P EST MOD 30 MIN: CPT | Mod: 25,95

## 2020-05-15 NOTE — COUNSELING
[Cessation strategies including cessation program discussed] : Cessation strategies including cessation program discussed [Risk of tobacco use and health benefits of smoking cessation discussed] : Risk of tobacco use and health benefits of smoking cessation discussed [Use of nicotine replacement therapies and other medications discussed] : Use of nicotine replacement therapies and other medications discussed [Willing to Quit Smoking] : Willing to quit smoking [Participate in Class] : Participate in class [Tobacco Use Cessation Intermediate Greater Than 3 Minutes Up to 10 Minutes] : Tobacco Use Cessation Intermediate Greater Than 3 Minutes Up to 10 Minutes [FreeTextEntry1] : 4

## 2020-05-15 NOTE — PHYSICAL EXAM
[No Acute Distress] : no acute distress [Well-Appearing] : well-appearing [Well Nourished] : well nourished [Speech Grossly Normal] : speech grossly normal [No Respiratory Distress] : no respiratory distress  [Memory Grossly Normal] : memory grossly normal [Normal Mood] : the mood was normal

## 2020-05-15 NOTE — REVIEW OF SYSTEMS
[Back Pain] : back pain [Negative] : Neurological [Muscle Weakness] : no muscle weakness [Depression] : no depression [Suicidal] : not suicidal

## 2020-05-15 NOTE — HISTORY OF PRESENT ILLNESS
[Home] : at home, [unfilled] , at the time of the visit. [Medical Office: ([facility])___] : at the medical office located in  [Patient] : the patient [Self] : self [de-identified] : START TIME: 11:07\par END TIME: 11:27\par \par MS. BEARD IS A PLEASANT 66 YO.  HAS UPCOMING APPOINTMENT FOR SCREENING CT CHEST FOR LUNG CANCER SCREENING.    CHRONIC HISTORY OF ANXIETY ON PAROXETINE.  DOING "GOOD" ON MEDICATION.  GARDENING FOR STRESS RELIEF.  ENJOYING SPENDING TIME OUTSIDE. NOT DOWN OR DEPRESSED.  NO PANIC ATTACKS.  SEES ENDOCRINOLOGY DR. PERLMAN IN FOLLOW-UP OF THYROID.  SEEING PAIN MANAGEMENT DR. PARK.  BEING SEEN FOR BACK PAIN.  ADVISED SHE HAS BULGING DISCS AND STENOSIS.  GETTING INJECTIONS - EPIDURAL.  RESTARTED SMOKING.  SMOKING UP TO 5 CIGARETTES A DAY.  PREVIOUSLY QUIT WITH THE NICTOINE PATCHES IN THE PAST.  AWARE OF THE RISKS OF TOBACCO USE.  PREVIOUSLY TRIED ACUPUNCTURE AND HYPNOTISM.  THEY DID NOT HELP.  IS WILLING TO TRY PATCHES AGAIN.   [FreeTextEntry1] : F/U ANXIETY

## 2020-05-15 NOTE — PLAN
[FreeTextEntry1] : DOING WELL ON CURRENT DOSAGE OF PAROXETINE - WILL CONTINUE\par STRESS REDUCTION EXERCISES\par SMOKING CESSATION COUNSELLING PROVIDED FOR FOUR MINUTES.  DISCUSSED RISKS OF TOBACCO USE AND METHODS TO QUIT.  DISCUSSED SMOKING CESSATION PROGRAM AT [facility]\par WILL START NICOTINE PATCH AGAIN\par TO GO FOR NEXT CT LUNG CANCER SCREENING\par FOLLOW-UP ALL SPECIALISTS AS DIRECTED \par RECENT LAB WORK REVIEWED - WILL MONITOR HBA1C\par CALL WITH ANY QUESTIONS, CONCERNS OR CHANGES \par FOLLOW-UP IN OFFICE WHEN ABLE FOR CPE\par FLU VACCINE IN THE FALL RECOMMENDED

## 2020-05-19 ENCOUNTER — APPOINTMENT (OUTPATIENT)
Dept: CT IMAGING | Facility: CLINIC | Age: 65
End: 2020-05-19
Payer: MEDICARE

## 2020-05-19 ENCOUNTER — OUTPATIENT (OUTPATIENT)
Dept: OUTPATIENT SERVICES | Facility: HOSPITAL | Age: 65
LOS: 1 days | End: 2020-05-19
Payer: MEDICARE

## 2020-05-19 DIAGNOSIS — K37 UNSPECIFIED APPENDICITIS: Chronic | ICD-10-CM

## 2020-05-19 DIAGNOSIS — Z87.891 PERSONAL HISTORY OF NICOTINE DEPENDENCE: ICD-10-CM

## 2020-05-19 DIAGNOSIS — Z98.891 HISTORY OF UTERINE SCAR FROM PREVIOUS SURGERY: Chronic | ICD-10-CM

## 2020-05-19 DIAGNOSIS — Z98.890 OTHER SPECIFIED POSTPROCEDURAL STATES: Chronic | ICD-10-CM

## 2020-05-19 DIAGNOSIS — Z00.00 ENCOUNTER FOR GENERAL ADULT MEDICAL EXAMINATION WITHOUT ABNORMAL FINDINGS: ICD-10-CM

## 2020-05-19 PROCEDURE — G0297: CPT

## 2020-05-19 PROCEDURE — G0297: CPT | Mod: 26

## 2020-06-04 ENCOUNTER — TRANSCRIPTION ENCOUNTER (OUTPATIENT)
Age: 65
End: 2020-06-04

## 2020-06-09 ENCOUNTER — TRANSCRIPTION ENCOUNTER (OUTPATIENT)
Age: 65
End: 2020-06-09

## 2020-08-18 NOTE — HISTORY OF PRESENT ILLNESS
BEHAVIORAL [facility] FOLLOW UP    This visit was performed via live interactive two-way video.    During their visit, the patient was informed that their consent to treat includes permission to submit claims to their insurance on their behalf for the services received.  Clinician Location: [facility], [facility] [address] [facility]    Patient Location: Home  TOTAL TIME SPENT:    PATIENT: Reza Roberts    MEDICAL RECORD NUMBER: [id number]  YOB: 1997  AGE: 23 year old    DATE: 8/18/2020   Time: 11:15 AM     IDENTIFICATION:  Reza Roberts is a 23 year old mother of one from [place].    CHIEF COMPLAINT:  \"behavioral health, treat disorders today.\"    HISTORY OF PRESENTING ILLNESS:  The patient reports a past history of depression starting at age 6. The last time I saw her was  on 04/21/2020 when I recommended that she continue Adderall XL 30 mg and lamotrigine 250 mg daily, hydroxyzine and Abilify. Since then she denies side effects to medication.     She did find a job at the [employer] but was not getting enough hours. She got hired at [employer] on the spot yesterday so will quit the [employer]. 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     Recently, in the present, mood has been \"really good.\"  Appetite has been \"high.\"  Energy has been \"pretty good.\"  Sleep has been \"hypersomnia.\"  Concentration has been \"good.\"  Denies anhedonia.  Denies suicidal ideation, intent and plan.  Denies homicidal ideation, intent and plan.      Manic symptoms: she does not appear manic today.    Generalized anxiety: On a scale from one to ten (10 being the worst), the patient rates anxiety at a can be varies/10.    OCD: denies symptoms. She does check the locks multiple times per night but this is not impairing.    PTSD symptoms: Patient has had exposure to actual or  [de-identified] : S/P Right index finger mucous cyst excision, Right index finger distal interphalangeal joint excisional debridement, DOS: 1/8/19. threatened death de-identified] : 01/22/2019\par CHARLIE BEARD is about 2 weeks status post the above procedure, doing well.  Reports mild pain in the DIP joint of right index finger.  Denies fevers or chills, denies paresthesia. needed for rash   • calcium carbonate (TUMS) 500 MG chewable tablet Chew 1 tablet by mouth as needed for Heartburn.   • acetaminophen (TYLENOL) 325 MG tablet Take 650 mg by mouth every 4 hours as needed.     No current facility-administered medications for this visit.        VITAL SIGNS:  Visit Vitals  LMP 12/19/2019       LABS:  TSH (mcUnits/mL)   Date Value   11/23/2018 3.893     No results found for: T4FREE    MEDICAL HISTORY:  Patient Active Problem List    Diagnosis Date Noted   • Morbid obesity with BMI of 45.0-49.9, adult (CMS/[facility]) 10/07/2019     Priority: Low   • Bipolar 1 disorder (CMS/[facility]) 03/12/2019     Priority: Low   • ROD (generalized anxiety disorder) 03/12/2019     Priority: Low   • Migraine with aura and without status migrainosus, not intractable 11/23/2018     Priority: Low       PRIMARY CARE PROVIDER:  BERNY Avila    PSYCHIATRIC HISTORY:  Previous Diagnosis: diagnosed with bipolar disorder at age 6.  Therapist: Currently followed by Giana Gomes.  Previous Psychiatrist: Nic for years.  Previous Psychiatric Hospitalizations: denies.  Previous Suicide Attempts: wrote a note once but never tried.  Self-Injurious Behavior: cut once.  AODA Treatment: denies.  Past Psychiatric Medication Trials: seroquel, concerta, vyvanse (anxious), Adderall (beneficial).    SOCIAL HISTORY  Born/raised: [place].  Childhood: fair.  Siblings: Shiva (27, half sibling), Nika (19, half sibling), Yvonne (17, full brother), Mar (20, step sister), Claudia (14, step sister).  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  Work:  at [employer] (quit due to pregnancy).  Hobbies/Interests: reading, writing, photography, cooking, baking, music, watching movies and TV, hiking.  Marital status: single, bisexual.  Children: Elissa (2.5 years old).  Living Situation: two roommates.  Pets: Persephone, Chambers, Mandolin, [place], Sweet Pea.  Source of support: Claudia,  [de-identified] : R index finger incision healing well, dried blood stain with no drainage, minimal erythema, no signs of infection.  Sutures removed in the office.  Mild tenderness to palpation over DIP joint.  Flexor and extensor intact.  Baseline deformity due to arthritis in the joint.  Sensation intact. [facility]  Knowledge:  Consistent with education and experiences as evidenced by vocabulary.   Insight:  Good, based on appropriate recognition of impact of psychiatric symptoms and need for treatment.   Judgment:  Good, based on recent decisions.  Safety:  Noted in History of Present Illness.  Motivation to pursue treatment:  Good.    ASSESSMENT AND PLAN:    #1 Bipolar Disorder, rapid cycling-She will continue Adderall XR to 30 mg daily and Lamotrigine to 250 mg daily. She agrees to continue Abilify 5 mg daily to help with anger outbursts.    #2 General Anxiety Disorder-She will continue Adderall XR to 30 mg daily and  Lamotrigine 250 mg daily. She will continue hydroxyzine 25 mg every 8 hours as needed for panic/anxiety.    #3 Social Anxiety disorder-She will continue Adderall XR to 30 mg daily and  Lamotrigine to 250 mg daily.    #4 ADHD-She will continue Adderall XR to 30 mg daily.    Gerald presents with stable symptoms of bipolar disorder. She has taken Adderall for several years for mood instability and this has worked for her. She will increase lamotrigine to help further stabilize her moods and continue  Abilify. Discussed medication side effects, risks and benefits and patient expressed understanding. She will return in 2 months for a follow up or call for worsening symptoms. .    Plan B: increase lamotrigine      BERNY Bailey   [de-identified] : OR specimen pathology report shared with patient, showing synovial cyst and no malignancy. [de-identified] : Doing well, can gradually return to regular activities without restrictions.  Will see as needed.

## 2020-08-31 ENCOUNTER — RX RENEWAL (OUTPATIENT)
Age: 65
End: 2020-08-31

## 2020-09-01 ENCOUNTER — APPOINTMENT (OUTPATIENT)
Dept: FAMILY MEDICINE | Facility: CLINIC | Age: 65
End: 2020-09-01
Payer: MEDICARE

## 2020-09-01 VITALS
DIASTOLIC BLOOD PRESSURE: 80 MMHG | HEART RATE: 95 BPM | SYSTOLIC BLOOD PRESSURE: 142 MMHG | WEIGHT: 122 LBS | HEIGHT: 59 IN | BODY MASS INDEX: 24.6 KG/M2

## 2020-09-01 DIAGNOSIS — H35.30 UNSPECIFIED MACULAR DEGENERATION: ICD-10-CM

## 2020-09-01 PROCEDURE — 99213 OFFICE O/P EST LOW 20 MIN: CPT

## 2020-09-01 RX ORDER — RISEDRONATE SODIUM 35 MG/1
35 TABLET, FILM COATED ORAL
Qty: 13 | Refills: 1 | Status: DISCONTINUED | COMMUNITY
Start: 2019-11-27 | End: 2020-09-01

## 2020-09-01 NOTE — PLAN
[FreeTextEntry1] : DOING WELL ON CURRENT DOSAGE OF PAROXETINE - WILL CONTINUE\par SMOKING CESSATION\par REVIEWED CT CHEST\par TO GO FOR BREAST IMAGING THROUGH GYN AND FOLLOW-UP WITH GYN\par FOLLOW-UP ALL SPECIALISTS AS DIRECTED \par FLU VACCINE IN THE FALL\par CALL WITH ANY QUESTIONS, CONCERNS OR CHANGES

## 2020-09-01 NOTE — PHYSICAL EXAM
[No Acute Distress] : no acute distress [Well Nourished] : well nourished [Well-Appearing] : well-appearing [No Respiratory Distress] : no respiratory distress  [No Accessory Muscle Use] : no accessory muscle use [Clear to Auscultation] : lungs were clear to auscultation bilaterally [Normal Rate] : normal rate  [Regular Rhythm] : with a regular rhythm [Normal S1, S2] : normal S1 and S2 [Speech Grossly Normal] : speech grossly normal [Memory Grossly Normal] : memory grossly normal [Normal Mood] : the mood was normal

## 2020-09-01 NOTE — HISTORY OF PRESENT ILLNESS
[FreeTextEntry1] : f/u anxiety [de-identified] : MS. BEARD IS A PLEASANT 66 YO PRESENTING FOR FOLLOW-UP.  CHRONIC HISTORY OF ANXIETY, TOBACCO USE, THYROID NODULE UNDER THE CARE OF ENDOCRINOLOGY AND BEING TREATED BY OPHTHALMOLOGY FOR MACULAR DEGENERATION.  HAS BEEN SMOKING 10 CIGARETTES A DAY.  DID NOT GET NICOTINE PATCHES BECAUSE PHARMACY DID NOT HAVE THEM.  WILL RECONSIDER TAKING THEM.  AWARE OF THE RISKS OF TOBACCO USE.  HAS AN UPCOMING APPOINTMENT WITH GYN AND WILL BE GOING FOR MAMMOGRAM.  FEELING VERY WELL ON PAROXETINE FOR ANXIETY.  NOT FEELING ANXIOUS, DOWN OR DEPRESSED.  NO SUICIDAL/HOMICIDAL IDEATIONS OR PLANS.  GETTING ALONG WELL WITH OTHER.  GOOD APPETITE.

## 2020-10-07 ENCOUNTER — APPOINTMENT (OUTPATIENT)
Dept: DERMATOLOGY | Facility: CLINIC | Age: 65
End: 2020-10-07

## 2020-10-13 ENCOUNTER — APPOINTMENT (OUTPATIENT)
Dept: OBGYN | Facility: CLINIC | Age: 65
End: 2020-10-13

## 2020-11-03 ENCOUNTER — RESULT REVIEW (OUTPATIENT)
Age: 65
End: 2020-11-03

## 2020-11-03 ENCOUNTER — APPOINTMENT (OUTPATIENT)
Dept: DERMATOLOGY | Facility: CLINIC | Age: 65
End: 2020-11-03
Payer: MEDICARE

## 2020-11-03 PROCEDURE — 11102 TANGNTL BX SKIN SINGLE LES: CPT

## 2020-11-03 PROCEDURE — 11103 TANGNTL BX SKIN EA SEP/ADDL: CPT | Mod: 59

## 2020-11-03 PROCEDURE — 17003 DESTRUCT PREMALG LES 2-14: CPT

## 2020-11-03 PROCEDURE — 99214 OFFICE O/P EST MOD 30 MIN: CPT | Mod: 25

## 2020-11-03 PROCEDURE — 17000 DESTRUCT PREMALG LESION: CPT | Mod: 59

## 2020-11-19 DIAGNOSIS — Z23 ENCOUNTER FOR IMMUNIZATION: ICD-10-CM

## 2020-11-23 ENCOUNTER — TRANSCRIPTION ENCOUNTER (OUTPATIENT)
Age: 65
End: 2020-11-23

## 2020-11-23 ENCOUNTER — APPOINTMENT (OUTPATIENT)
Dept: FAMILY MEDICINE | Facility: CLINIC | Age: 65
End: 2020-11-23
Payer: MEDICARE

## 2020-11-23 VITALS
SYSTOLIC BLOOD PRESSURE: 110 MMHG | HEIGHT: 59 IN | TEMPERATURE: 98.3 F | BODY MASS INDEX: 25.6 KG/M2 | WEIGHT: 127 LBS | HEART RATE: 84 BPM | OXYGEN SATURATION: 97 % | DIASTOLIC BLOOD PRESSURE: 60 MMHG

## 2020-11-23 DIAGNOSIS — Z87.09 PERSONAL HISTORY OF OTHER DISEASES OF THE RESPIRATORY SYSTEM: ICD-10-CM

## 2020-11-23 PROCEDURE — 99214 OFFICE O/P EST MOD 30 MIN: CPT

## 2020-11-23 NOTE — PHYSICAL EXAM
[No Acute Distress] : no acute distress [Normal Sclera/Conjunctiva] : normal sclera/conjunctiva [No JVD] : no jugular venous distention [No Lymphadenopathy] : no lymphadenopathy [No Accessory Muscle Use] : no accessory muscle use [Clear to Auscultation] : lungs were clear to auscultation bilaterally [Regular Rhythm] : with a regular rhythm [Normal S1, S2] : normal S1 and S2 [Soft] : abdomen soft [No HSM] : no HSM [No Focal Deficits] : no focal deficits [Normal Gait] : normal gait [Alert and Oriented x3] : oriented to person, place, and time [Normal Insight/Judgement] : insight and judgment were intact [de-identified] : calm  [de-identified] : OMM  no exudates   a bit hyperemic

## 2020-11-23 NOTE — ASSESSMENT
[FreeTextEntry1] : Acute pharyngitis   advised not strep.                        \par                                Continue Cipro as prescribed.\par \par Salt water gargles, Tylenol , lemon drops and fluids. Steam shower. REST. \par \par Left office reassured. \par

## 2020-11-23 NOTE — HISTORY OF PRESENT ILLNESS
[FreeTextEntry8] : pt in office c/o headaches, swollen glands.\par \par Endorses she had LEFT HIP Injection ( steroids) 5 days ago at [facility].\par Three days of frontal headache; \par Due to close contact with someone who became + with COVID, went to  2 days ago; rapid COVID test NEGATIVE.\par + UTI there and now on Cipro 500 mgs BID for 7 days.\par \par Reports ST yesterday and today and thinks it is STREP. \par No Fever; no exposure to strep;

## 2020-12-21 ENCOUNTER — RX RENEWAL (OUTPATIENT)
Age: 65
End: 2020-12-21

## 2020-12-23 PROBLEM — Z87.09 HISTORY OF ACUTE PHARYNGITIS: Status: RESOLVED | Noted: 2020-11-23 | Resolved: 2020-12-23

## 2020-12-30 ENCOUNTER — APPOINTMENT (OUTPATIENT)
Dept: OBGYN | Facility: CLINIC | Age: 65
End: 2020-12-30
Payer: MEDICARE

## 2020-12-30 VITALS
HEART RATE: 101 BPM | HEIGHT: 59 IN | WEIGHT: 125 LBS | BODY MASS INDEX: 25.2 KG/M2 | DIASTOLIC BLOOD PRESSURE: 81 MMHG | SYSTOLIC BLOOD PRESSURE: 124 MMHG

## 2020-12-30 DIAGNOSIS — Z78.0 ASYMPTOMATIC MENOPAUSAL STATE: ICD-10-CM

## 2020-12-30 DIAGNOSIS — Z12.31 ENCOUNTER FOR SCREENING MAMMOGRAM FOR MALIGNANT NEOPLASM OF BREAST: ICD-10-CM

## 2020-12-30 DIAGNOSIS — Z01.419 ENCOUNTER FOR GYNECOLOGICAL EXAMINATION (GENERAL) (ROUTINE) W/OUT ABNORMAL FINDINGS: ICD-10-CM

## 2020-12-30 PROCEDURE — G0101: CPT

## 2020-12-30 NOTE — DISCUSSION/SUMMARY
[FreeTextEntry1] : Well woman exam\par \par pap done - does not need anymore paps\par mammo ordered\par recommended taking vit. D 2000 units daily and through diet obtain 1200 mg of calcium along with 2.5 hours of weight bearing exercise per week\par return in 1 year\par  actonel refilled

## 2020-12-30 NOTE — PHYSICAL EXAM
[Appropriately responsive] : appropriately responsive [Alert] : alert [No Acute Distress] : no acute distress [No Lymphadenopathy] : no lymphadenopathy [Soft] : soft [Non-tender] : non-tender [Non-distended] : non-distended [No HSM] : No HSM [No Lesions] : no lesions [No Mass] : no mass [Oriented x3] : oriented x3 [Examination Of The Breasts] : a normal appearance [No Masses] : no breast masses were palpable [Labia Majora] : normal [Labia Minora] : normal [Atrophy] : atrophy [Normal] : normal [Uterine Adnexae] : normal [FreeTextEntry4] : cx difficult to see, pt uncomfortable with speculum, used pediatric spec, blind pap done

## 2020-12-30 NOTE — HISTORY OF PRESENT ILLNESS
[FreeTextEntry1] : 64 yo here for av. She has no gyn complaints today. She denies Pmb, no Hemalatha.\par She smokes .5ppd.\par Her aunt had breast cacner age 65.\par Pt has never had an abnormal pap in the past.\par \par She has osteoporosis of both hips and takes Actonel 35mg. [ColonoscopyDate] : 2015

## 2021-01-06 LAB — CYTOLOGY CVX/VAG DOC THIN PREP: ABNORMAL

## 2021-01-07 ENCOUNTER — APPOINTMENT (OUTPATIENT)
Dept: DERMATOLOGY | Facility: CLINIC | Age: 66
End: 2021-01-07
Payer: MEDICARE

## 2021-01-07 ENCOUNTER — RESULT REVIEW (OUTPATIENT)
Age: 66
End: 2021-01-07

## 2021-01-07 PROCEDURE — 17261 DSTRJ MAL LES T/A/L .6-1.0CM: CPT | Mod: 76

## 2021-01-07 PROCEDURE — 17000 DESTRUCT PREMALG LESION: CPT | Mod: 59

## 2021-01-07 PROCEDURE — 99213 OFFICE O/P EST LOW 20 MIN: CPT | Mod: 25

## 2021-01-07 PROCEDURE — 17003 DESTRUCT PREMALG LES 2-14: CPT

## 2021-01-07 PROCEDURE — 11103 TANGNTL BX SKIN EA SEP/ADDL: CPT

## 2021-01-07 PROCEDURE — 11102 TANGNTL BX SKIN SINGLE LES: CPT | Mod: 59

## 2021-02-26 ENCOUNTER — APPOINTMENT (OUTPATIENT)
Dept: DERMATOLOGY | Facility: CLINIC | Age: 66
End: 2021-02-26

## 2021-02-28 ENCOUNTER — RX RENEWAL (OUTPATIENT)
Age: 66
End: 2021-02-28

## 2021-03-01 ENCOUNTER — APPOINTMENT (OUTPATIENT)
Dept: FAMILY MEDICINE | Facility: CLINIC | Age: 66
End: 2021-03-01

## 2021-03-04 ENCOUNTER — APPOINTMENT (OUTPATIENT)
Dept: GASTROENTEROLOGY | Facility: CLINIC | Age: 66
End: 2021-03-04

## 2021-04-13 ENCOUNTER — APPOINTMENT (OUTPATIENT)
Dept: DERMATOLOGY | Facility: CLINIC | Age: 66
End: 2021-04-13
Payer: MEDICARE

## 2021-04-13 PROCEDURE — 17110 DESTRUCTION B9 LES UP TO 14: CPT | Mod: 59

## 2021-04-13 PROCEDURE — 17262 DSTRJ MAL LES T/A/L 1.1-2.0: CPT

## 2021-04-13 PROCEDURE — 99212 OFFICE O/P EST SF 10 MIN: CPT | Mod: 25

## 2021-04-13 PROCEDURE — 17261 DSTRJ MAL LES T/A/L .6-1.0CM: CPT

## 2021-04-20 ENCOUNTER — NON-APPOINTMENT (OUTPATIENT)
Age: 66
End: 2021-04-20

## 2021-05-03 ENCOUNTER — APPOINTMENT (OUTPATIENT)
Dept: DERMATOLOGY | Facility: CLINIC | Age: 66
End: 2021-05-03

## 2021-05-07 ENCOUNTER — APPOINTMENT (OUTPATIENT)
Dept: DERMATOLOGY | Facility: CLINIC | Age: 66
End: 2021-05-07

## 2021-07-01 ENCOUNTER — APPOINTMENT (OUTPATIENT)
Dept: GASTROENTEROLOGY | Facility: CLINIC | Age: 66
End: 2021-07-01
Payer: MEDICARE

## 2021-07-01 VITALS
WEIGHT: 122 LBS | DIASTOLIC BLOOD PRESSURE: 93 MMHG | BODY MASS INDEX: 24.6 KG/M2 | TEMPERATURE: 98.1 F | OXYGEN SATURATION: 98 % | SYSTOLIC BLOOD PRESSURE: 145 MMHG | HEIGHT: 59 IN | HEART RATE: 77 BPM | RESPIRATION RATE: 14 BRPM

## 2021-07-01 PROCEDURE — 99214 OFFICE O/P EST MOD 30 MIN: CPT

## 2021-07-01 NOTE — ASSESSMENT
[FreeTextEntry1] : The patient is doing well and is satisfied with her overall condition and her quality of life.  We discussed possibly increasing the budesonide again because she is having 3-4 bowel movements although 2 of them are formed and she is satisfied with how she is now is concerned about taking more pills.  In addition, I ordered routine CBC and CMP as its been almost a year.  She will call for those results and follow-up in 6 months or as needed

## 2021-07-01 NOTE — HISTORY OF PRESENT ILLNESS
[de-identified] : Patient with several year history of lymphocytic colitis.  She was last seen here in September 2019.  She reduced her budesonide to 1 3 mg pill per day because she felt that she took 9 mg/day she will get cramps from it although its unclear that was the etiology.  She basically feels well and she has 2 formed bowel movements in the morning and then later in the day she will have 1 or 2 softer bowel movements.  She has no more cramps and no nocturnal symptoms or urgency.  She has no bleeding.  I reviewed her blood work from last summer and was unremarkable.  I recently renewed her prescriptions.

## 2021-07-07 LAB
BASOPHILS # BLD AUTO: 0.08 K/UL
BASOPHILS NFR BLD AUTO: 1.1 %
EOSINOPHIL # BLD AUTO: 0.38 K/UL
EOSINOPHIL NFR BLD AUTO: 5.2 %
HCT VFR BLD CALC: 37.8 %
HGB BLD-MCNC: 12.2 G/DL
IMM GRANULOCYTES NFR BLD AUTO: 0.4 %
LYMPHOCYTES # BLD AUTO: 2.58 K/UL
LYMPHOCYTES NFR BLD AUTO: 35.2 %
MAN DIFF?: NORMAL
MCHC RBC-ENTMCNC: 32.3 GM/DL
MCHC RBC-ENTMCNC: 32.4 PG
MCV RBC AUTO: 100.3 FL
MONOCYTES # BLD AUTO: 0.56 K/UL
MONOCYTES NFR BLD AUTO: 7.6 %
NEUTROPHILS # BLD AUTO: 3.7 K/UL
NEUTROPHILS NFR BLD AUTO: 50.5 %
PLATELET # BLD AUTO: 437 K/UL
RBC # BLD: 3.77 M/UL
RBC # FLD: 14 %
WBC # FLD AUTO: 7.33 K/UL

## 2021-07-08 LAB
ALBUMIN SERPL ELPH-MCNC: 4.5 G/DL
ALP BLD-CCNC: 90 U/L
ALT SERPL-CCNC: 18 U/L
ANION GAP SERPL CALC-SCNC: 15 MMOL/L
AST SERPL-CCNC: 25 U/L
BILIRUB SERPL-MCNC: 0.3 MG/DL
BUN SERPL-MCNC: 14 MG/DL
CALCIUM SERPL-MCNC: 9.6 MG/DL
CHLORIDE SERPL-SCNC: 105 MMOL/L
CO2 SERPL-SCNC: 21 MMOL/L
CREAT SERPL-MCNC: 0.51 MG/DL
GLUCOSE SERPL-MCNC: 95 MG/DL
POTASSIUM SERPL-SCNC: 4.9 MMOL/L
PROT SERPL-MCNC: 6.7 G/DL
SODIUM SERPL-SCNC: 141 MMOL/L

## 2021-07-17 ENCOUNTER — EMERGENCY (EMERGENCY)
Facility: HOSPITAL | Age: 66
LOS: 1 days | Discharge: HOME | End: 2021-07-17
Attending: EMERGENCY MEDICINE
Payer: MEDICARE

## 2021-07-17 VITALS
WEIGHT: 121.92 LBS | SYSTOLIC BLOOD PRESSURE: 135 MMHG | TEMPERATURE: 98 F | RESPIRATION RATE: 14 BRPM | HEIGHT: 60 IN | DIASTOLIC BLOOD PRESSURE: 84 MMHG | OXYGEN SATURATION: 95 % | HEART RATE: 76 BPM

## 2021-07-17 DIAGNOSIS — K37 UNSPECIFIED APPENDICITIS: Chronic | ICD-10-CM

## 2021-07-17 DIAGNOSIS — Z98.890 OTHER SPECIFIED POSTPROCEDURAL STATES: Chronic | ICD-10-CM

## 2021-07-17 DIAGNOSIS — Z98.891 HISTORY OF UTERINE SCAR FROM PREVIOUS SURGERY: Chronic | ICD-10-CM

## 2021-07-17 LAB
ALBUMIN SERPL ELPH-MCNC: 4.1 G/DL — SIGNIFICANT CHANGE UP (ref 3.3–5.2)
ALP SERPL-CCNC: 95 U/L — SIGNIFICANT CHANGE UP (ref 40–120)
ALT FLD-CCNC: 23 U/L — SIGNIFICANT CHANGE UP
ANION GAP SERPL CALC-SCNC: 13 MMOL/L — SIGNIFICANT CHANGE UP (ref 5–17)
AST SERPL-CCNC: 31 U/L — SIGNIFICANT CHANGE UP
BASOPHILS # BLD AUTO: 0.05 K/UL — SIGNIFICANT CHANGE UP (ref 0–0.2)
BASOPHILS NFR BLD AUTO: 0.6 % — SIGNIFICANT CHANGE UP (ref 0–2)
BILIRUB SERPL-MCNC: 0.4 MG/DL — SIGNIFICANT CHANGE UP (ref 0.4–2)
BUN SERPL-MCNC: 14.8 MG/DL — SIGNIFICANT CHANGE UP (ref 8–20)
CALCIUM SERPL-MCNC: 9.3 MG/DL — SIGNIFICANT CHANGE UP (ref 8.6–10.2)
CHLORIDE SERPL-SCNC: 98 MMOL/L — SIGNIFICANT CHANGE UP (ref 98–107)
CO2 SERPL-SCNC: 23 MMOL/L — SIGNIFICANT CHANGE UP (ref 22–29)
CREAT SERPL-MCNC: 0.42 MG/DL — LOW (ref 0.5–1.3)
EOSINOPHIL # BLD AUTO: 0.15 K/UL — SIGNIFICANT CHANGE UP (ref 0–0.5)
EOSINOPHIL NFR BLD AUTO: 1.8 % — SIGNIFICANT CHANGE UP (ref 0–6)
GLUCOSE SERPL-MCNC: 76 MG/DL — SIGNIFICANT CHANGE UP (ref 70–99)
HCT VFR BLD CALC: 34.9 % — SIGNIFICANT CHANGE UP (ref 34.5–45)
HGB BLD-MCNC: 11.8 G/DL — SIGNIFICANT CHANGE UP (ref 11.5–15.5)
IMM GRANULOCYTES NFR BLD AUTO: 0.2 % — SIGNIFICANT CHANGE UP (ref 0–1.5)
LYMPHOCYTES # BLD AUTO: 2.06 K/UL — SIGNIFICANT CHANGE UP (ref 1–3.3)
LYMPHOCYTES # BLD AUTO: 25.3 % — SIGNIFICANT CHANGE UP (ref 13–44)
MAGNESIUM SERPL-MCNC: 2.1 MG/DL — SIGNIFICANT CHANGE UP (ref 1.8–2.6)
MCHC RBC-ENTMCNC: 32.6 PG — SIGNIFICANT CHANGE UP (ref 27–34)
MCHC RBC-ENTMCNC: 33.8 GM/DL — SIGNIFICANT CHANGE UP (ref 32–36)
MCV RBC AUTO: 96.4 FL — SIGNIFICANT CHANGE UP (ref 80–100)
MONOCYTES # BLD AUTO: 0.58 K/UL — SIGNIFICANT CHANGE UP (ref 0–0.9)
MONOCYTES NFR BLD AUTO: 7.1 % — SIGNIFICANT CHANGE UP (ref 2–14)
NEUTROPHILS # BLD AUTO: 5.27 K/UL — SIGNIFICANT CHANGE UP (ref 1.8–7.4)
NEUTROPHILS NFR BLD AUTO: 65 % — SIGNIFICANT CHANGE UP (ref 43–77)
PLATELET # BLD AUTO: 395 K/UL — SIGNIFICANT CHANGE UP (ref 150–400)
POTASSIUM SERPL-MCNC: 4.3 MMOL/L — SIGNIFICANT CHANGE UP (ref 3.5–5.3)
POTASSIUM SERPL-SCNC: 4.3 MMOL/L — SIGNIFICANT CHANGE UP (ref 3.5–5.3)
PROT SERPL-MCNC: 6.8 G/DL — SIGNIFICANT CHANGE UP (ref 6.6–8.7)
RBC # BLD: 3.62 M/UL — LOW (ref 3.8–5.2)
RBC # FLD: 13.5 % — SIGNIFICANT CHANGE UP (ref 10.3–14.5)
SODIUM SERPL-SCNC: 134 MMOL/L — LOW (ref 135–145)
TROPONIN T SERPL-MCNC: <0.01 NG/ML — SIGNIFICANT CHANGE UP (ref 0–0.06)
TSH SERPL-MCNC: 1.62 UIU/ML — SIGNIFICANT CHANGE UP (ref 0.27–4.2)
WBC # BLD: 8.13 K/UL — SIGNIFICANT CHANGE UP (ref 3.8–10.5)
WBC # FLD AUTO: 8.13 K/UL — SIGNIFICANT CHANGE UP (ref 3.8–10.5)

## 2021-07-17 PROCEDURE — 71046 X-RAY EXAM CHEST 2 VIEWS: CPT

## 2021-07-17 PROCEDURE — 85025 COMPLETE CBC W/AUTO DIFF WBC: CPT

## 2021-07-17 PROCEDURE — 93010 ELECTROCARDIOGRAM REPORT: CPT

## 2021-07-17 PROCEDURE — 93005 ELECTROCARDIOGRAM TRACING: CPT

## 2021-07-17 PROCEDURE — 36415 COLL VENOUS BLD VENIPUNCTURE: CPT

## 2021-07-17 PROCEDURE — 71046 X-RAY EXAM CHEST 2 VIEWS: CPT | Mod: 26,59

## 2021-07-17 PROCEDURE — 99285 EMERGENCY DEPT VISIT HI MDM: CPT

## 2021-07-17 PROCEDURE — 84443 ASSAY THYROID STIM HORMONE: CPT

## 2021-07-17 PROCEDURE — 83735 ASSAY OF MAGNESIUM: CPT

## 2021-07-17 PROCEDURE — 71101 X-RAY EXAM UNILAT RIBS/CHEST: CPT | Mod: 26

## 2021-07-17 PROCEDURE — 80053 COMPREHEN METABOLIC PANEL: CPT

## 2021-07-17 PROCEDURE — 84484 ASSAY OF TROPONIN QUANT: CPT

## 2021-07-17 PROCEDURE — 99284 EMERGENCY DEPT VISIT MOD MDM: CPT | Mod: 25

## 2021-07-17 PROCEDURE — 71101 X-RAY EXAM UNILAT RIBS/CHEST: CPT

## 2021-07-17 RX ORDER — ACETAMINOPHEN 500 MG
650 TABLET ORAL ONCE
Refills: 0 | Status: COMPLETED | OUTPATIENT
Start: 2021-07-17 | End: 2021-07-17

## 2021-07-17 NOTE — ED ADULT TRIAGE NOTE - CHIEF COMPLAINT QUOTE
Patient A&Ox4 complaining of pain to left side chest secondary to fracture of ribs 3 & 4 from trip & fall last night. Stated went to [facility] MD & was found to have abnormal EKG, sent to ED. Stated has had intermittent feelings of heart fluttering & palpitations x2 weeks.

## 2021-07-17 NOTE — ED PROVIDER NOTE - CARE PROVIDER_API CALL
Milton Avery)  Cardiovascular Disease  [address] [facility], [address] [id number]  Phone: [phone]  Fax: [phone]  Follow Up Time:

## 2021-07-17 NOTE — ED PROVIDER NOTE - OBJECTIVE STATEMENT
A 66 year old female with a past medial history if anxiety and gastritis is presenting today after an abnormal EKG at the urgent care. Last night she was outside and fell going up concrete steps trying to get back into a friends house. She was not able to grab anything and fell onto her left side. The patient stated it hurt but she decided to go home and rest. This morning the pain was worse so she went to the urgent care office, had a x-ray, and 2 broken ribs were seen. She reported to the urgent care staff that she has been having palpitations for 2 weeks so they did an EKG and decided to send her to the ED for evaluation. Patient denies any headach, dizziness, LOC, blurry vision, or any other related injuries from the incident. Patient is stable but not taking deep breaths as result of the rib fractures.

## 2021-07-17 NOTE — ED ADULT NURSE NOTE - OBJECTIVE STATEMENT
Patient A&Ox4 complaining of pain to left side chest secondary to fracture of ribs 3 & 4 from trip & fall last night. Stated went to [facility] MD & was found to have abnormal EKG, sent to ED. Stated has had intermittent feelings of heart fluttering & palpitations x2 weeks.

## 2021-07-17 NOTE — ED PROVIDER NOTE - PROGRESS NOTE DETAILS
Fuentes PGY-3: Pt feeling better.  Explained her cxr results, expressed importance of pain control and return precautions.  Gave her incentive spirometer, demonstrated use.  Pt reassessed, pt feeling better at this time, vss, pt able to walk, talk and vocalized plan of action. Discussed in depth and explained to pt in depth the next steps that need to be taking including proper follow up with PCP or specialists. All incidental findings were discussed with pt as well. Pt verbalized their concerns and all questions were answered. Pt understands dispo and wants discharge. Given good instructions when to return to ED and importance of f/u.

## 2021-07-17 NOTE — ED PROVIDER NOTE - ATTENDING CONTRIBUTION TO CARE
I, Dr. Wallace, performed a face to face bedside interview with this patient regarding history of present illness, review of symptoms and relevant past medical, social and family history.  I completed an independent physical examination.  I have also reviewed the resident's note(s) and discussed the plan with the resident.

## 2021-07-17 NOTE — ED PROVIDER NOTE - RESPIRATORY NEGATIVE STATEMENT, MLM
no chest pain, no cough, and no shortness of breath. Patient reports taking shallow breaths as a result of the rib fractures

## 2021-07-17 NOTE — ED PROVIDER NOTE - PATIENT PORTAL LINK FT
You can access the FollowMyHealth Patient Portal offered by [facility] by registering at the following website: [email or web address]. By joining [facility]’s FollowMyHealth portal, you will also be able to view your health information using other applications (apps) compatible with our system.

## 2021-07-17 NOTE — ED PROVIDER NOTE - MUSCULOSKELETAL, MLM
Spine appears normal, range of motion is not limited, no muscle. Tenderness over left sided upper ribs.

## 2021-07-17 NOTE — ED ADULT NURSE NOTE - CHIEF COMPLAINT QUOTE
Patient A&Ox4 complaining of pain to left side chest secondary to fracture of ribs 3 & 4 from trip & fall last night. Stated went to [facility] MD & was found to have abnormal EKG, sent to ED. Stated has had intermittent feelings of heart fluttering & palpitations x2 weeks.

## 2021-07-17 NOTE — ED PROVIDER NOTE - CLINICAL SUMMARY MEDICAL DECISION MAKING FREE TEXT BOX
Pt. with intermittent episodes of palpitations x 2 weeks. Pt. states that it feels like her heart is racing, lasting a few minutes. Pt. currently is asymptomatic. Pt. with no chest pain. NO SOB. Pt. also had fallen and sustained 2 rib fractures. EKG  normal appearing.  will check labs/EKG. Pt. will need outpatient cardiology follow up if labs are normal.

## 2021-08-11 ENCOUNTER — EMERGENCY (EMERGENCY)
Facility: HOSPITAL | Age: 66
LOS: 1 days | Discharge: HOME | End: 2021-08-11
Attending: EMERGENCY MEDICINE
Payer: MEDICARE

## 2021-08-11 VITALS
TEMPERATURE: 98 F | RESPIRATION RATE: 18 BRPM | WEIGHT: 121.03 LBS | SYSTOLIC BLOOD PRESSURE: 133 MMHG | OXYGEN SATURATION: 97 % | HEIGHT: 60 IN | HEART RATE: 70 BPM | DIASTOLIC BLOOD PRESSURE: 83 MMHG

## 2021-08-11 DIAGNOSIS — Z98.891 HISTORY OF UTERINE SCAR FROM PREVIOUS SURGERY: Chronic | ICD-10-CM

## 2021-08-11 DIAGNOSIS — Z98.890 OTHER SPECIFIED POSTPROCEDURAL STATES: Chronic | ICD-10-CM

## 2021-08-11 DIAGNOSIS — K37 UNSPECIFIED APPENDICITIS: Chronic | ICD-10-CM

## 2021-08-11 PROCEDURE — 73110 X-RAY EXAM OF WRIST: CPT

## 2021-08-11 PROCEDURE — 99284 EMERGENCY DEPT VISIT MOD MDM: CPT | Mod: 25

## 2021-08-11 PROCEDURE — 29125 APPL SHORT ARM SPLINT STATIC: CPT | Mod: LT

## 2021-08-11 PROCEDURE — 73130 X-RAY EXAM OF HAND: CPT

## 2021-08-11 PROCEDURE — 73130 X-RAY EXAM OF HAND: CPT | Mod: 26,LT

## 2021-08-11 PROCEDURE — 99283 EMERGENCY DEPT VISIT LOW MDM: CPT

## 2021-08-11 PROCEDURE — 73110 X-RAY EXAM OF WRIST: CPT | Mod: 26,LT

## 2021-08-11 NOTE — ED PROVIDER NOTE - PATIENT PORTAL LINK FT
You can access the FollowMyHealth Patient Portal offered by [facility] by registering at the following website: [email or web address]. By joining [facility]’s FollowMyHealth portal, you will also be able to view your health information using other applications (apps) compatible with our system.

## 2021-08-11 NOTE — ED PROVIDER NOTE - ATTENDING CONTRIBUTION TO CARE
seen with acp  s/p fall onto hand last night  this am pain and swelling wrist and hand  agree xray and reassess

## 2021-08-11 NOTE — ED PROVIDER NOTE - OBJECTIVE STATEMENT
Patient is a 66 year old female who presents c/o left wrist pain and swelling since last night when her dog pulled her causing fall backwards onto hand.  patient took tylenol last night with no relief of pain

## 2021-08-11 NOTE — ED PROCEDURE NOTE - PROCEDURE DATE TIME, MLM
This writer spoke with Silvia from Lab. Pt platelets this am 45. Trending down was 53 yesterday. This writer web paged Dr. Hook and informed Primary RN Nika Moseley.     11-Aug-2021 09:27

## 2021-08-11 NOTE — ED ADULT NURSE NOTE - OBJECTIVE STATEMENT
66 yof presents to ed s/p fall after dog pulled in different direction. patient left hand bruised. from knuckles to about an inch above wrist. patient denies hitting head when fell denies blood thinners.

## 2021-08-11 NOTE — ED PROVIDER NOTE - NSICDXPASTSURGICALHX_GEN_ALL_CORE_FT
PAST SURGICAL HISTORY:  Appendicitis appendectomy     H/O hand surgery left thumb open reduction     History of   and

## 2021-08-11 NOTE — ED PROVIDER NOTE - NSICDXFAMILYHX_GEN_ALL_CORE_FT
FAMILY HISTORY:  Father  Still living? No  Family history of hypertension in mother, Age at diagnosis: Age Unknown  Family history of lung cancer, Age at diagnosis: Age Unknown    Mother  Still living? No  Family history of COPD (chronic obstructive pulmonary disease), Age at diagnosis: Age Unknown  Family history of hypertension in mother, Age at diagnosis: Age Unknown

## 2021-08-11 NOTE — ED PROCEDURE NOTE - CPROC ED TIME OUT STATEMENT1
“Patient's name, , procedure and correct site were confirmed during the [place] Timeout.”

## 2021-08-11 NOTE — ED ADULT TRIAGE NOTE - RESPIRATORY RATE (BREATHS/MIN)
"Diabetes Self-Management Education & Support    Diabetes Education Self Management & Training    SUBJECTIVE/OBJECTIVE:  Presents for: Individual review  Accompanied by: Self  Diabetes education in the past 24mo: No  Diabetes type: Type 2  Disease course: Improving  Cultural Influences/Ethnic Background:  American      Diabetes Symptoms & Complications          Patient Problem List and Family Medical History reviewed for relevant medical history, current medical status, and diabetes risk factors.    Vitals:  LMP 08/12/2018 (Exact Date)  Estimated body mass index is 41.16 kg/(m^2) as calculated from the following:    Height as of an earlier encounter on 8/30/18: 1.676 m (5' 6\").    Weight as of an earlier encounter on 8/30/18: 115.7 kg (255 lb).   Last 3 BP:   BP Readings from Last 3 Encounters:   08/30/18 144/86   08/21/18 146/87   07/11/18 134/70       History   Smoking Status     Former Smoker     Years: 16.00     Types: Cigarettes     Quit date: 9/29/2008   Smokeless Tobacco     Never Used       Labs:  Lab Results   Component Value Date    A1C 8.2 07/02/2018     Lab Results   Component Value Date     08/30/2018     Lab Results   Component Value Date    LDL 33 10/16/2017     HDL Cholesterol   Date Value Ref Range Status   10/16/2017 67 >49 mg/dL Final   ]  GFR Estimate   Date Value Ref Range Status   08/30/2018 67 >60 mL/min/1.7m2 Final     Comment:     Non  GFR Calc     GFR Estimate If Black   Date Value Ref Range Status   08/30/2018 82 >60 mL/min/1.7m2 Final     Comment:      GFR Calc     Lab Results   Component Value Date    CR 0.89 08/30/2018     No results found for: MICROALBUMIN    Healthy Eating  Patient on a regular basis: Eats 3 meals a day  Meal planning: Avoiding sweets  Meals include: Breakfast, Lunch, Dinner  Breakfast: muffin or Belvita or granola bar or Cheerios with milk  Lunch: usually salad from home: lettuce, cheese, dressing, croutons  Dinner: at home, " variety  Beverages: Water, Diet soda, Coffee, Alcohol, Milk    Being Active  Exercise:: Currently not exercising  Barrier to exercise: Time    Monitoring  Did patient bring glucose meter to appointment? Has never had one.     Taking Medications  Diabetes Medication(s)     Biguanides Sig    metFORMIN (GLUCOPHAGE-XR) 500 MG 24 hr tablet TAKE 4 TABLETS (2,000MG) BY MOUTH EVERY DAY WITH DINNER    Sulfonylureas Sig    glipiZIDE (GLIPIZIDE XL) 10 MG 24 hr tablet Take 1 tablet (10 mg) by mouth daily          Current Treatments: Oral Agent (dual therapy)  Problems taking diabetes medications regularly?: No  Diabetes medication side effects?: No  Treatment Compliance: All of the time    Problem Solving  Hypoglycemia Frequency: Rarely - feels jittery.     Hypoglycemia Symptoms       Hypoglycemia Complications       Reducing Risks  Has dilated eye exam at least once a year?: Yes    Healthy Coping     Patient Activation Measure Survey Score:  MARIO ALBERTO Score (Last Two) 2/1/2011   MARIO ALBERTO Raw Score 39   Activation Score 56.4   MARIO ALBERTO Level 3       ASSESSMENT:  Patient would benefit from having monitor. Not interested in CGM at this time. Accu-chek Guide meter taught today. BG about 2 hours after lunch salad is 88. She is in life transition at this time with son going off to college so wants to take more time for exercise.     Patient's most recent   Lab Results   Component Value Date    A1C 8.2 07/02/2018    is not meeting goal of <7.0    INTERVENTION:   Diabetes knowledge and skills assessment:     Patient is knowledgeable in diabetes management concepts related to: Healthy Eating, Taking Medication and Healthy Coping    Patient needs further education on the following diabetes management concepts: Being Active, Monitoring, Problem Solving and Healthy Coping    Based on learning assessment above, most appropriate setting for further diabetes education would be: Individual setting.    Education provided today on:  AADE Self-Care  Behaviors:  Being Active: relationship to blood glucose  Monitoring: purpose, proper technique, log and interpret results, individual blood glucose targets, frequency of monitoring and use of glucose control solution  Problem Solving: low blood glucose - causes, signs/symptoms, treatment and prevention and carrying a carbohydrate source at all times  Healthy Coping: benefits of making appropriate lifestyle changes  Patient was instructed on Accu-Chek Guide meter and was able to provide an accurate return demonstration. Patient's blood glucose reading today was 88 mg/dL.    Opportunities for ongoing education and support in diabetes-self management were discussed.    Pt verbalized understanding of concepts discussed and recommendations provided today.       Education Materials Provided:  [facility] Understanding Diabetes Booklet, BG Log Sheet and Accu-Chek Guide meter kit    PLAN:  See Patient Instructions for co-developed, patient-stated behavior change goals.   See Follow-Up section for recommended follow-up.    Dominga Oliveira RDN, NARCISO, CDE    Time Spent: 60 minutes  Encounter Type: Individual    Any diabetes medication dose changes were made via the CDE Protocol and Collaborative Practice Agreement with the patient's referring provider. A copy of this encounter was shared with the provider.     18

## 2021-08-11 NOTE — ED PROVIDER NOTE - CARE PROVIDER_API CALL
Sari Reyes (DO)  Orthopaedic Surgery  [address] [place] [address]  Phone: [phone]  Fax: [phone]  Follow Up Time:

## 2021-08-11 NOTE — ED PROVIDER NOTE - NSICDXPASTMEDICALHX_GEN_ALL_CORE_FT
PAST MEDICAL HISTORY:  Bursal cyst right hand    Colitis     Hypothyroid Currently not on any medication. Last thyroid study/ lab work done October 2018.    Osteopenia     Skin cancer, basal cell facial- surgery done 2011 ?    Sleep apnea, unspecified type

## 2021-08-11 NOTE — ED PROVIDER NOTE - PROGRESS NOTE DETAILS
patient with ?? cortex disruption to distal radius, has snuffbox tenderness  will splint and send to ortho

## 2021-08-11 NOTE — ED PROVIDER NOTE - PHYSICAL EXAMINATION
left arm   positive swelling to radial aspect of hand and wrist   positive ecchymosis to dorsal hand and wrist   positive tenderness to snuffbox, distal radius and 2nd metacarpal   ROM limited due to pain

## 2021-08-19 ENCOUNTER — APPOINTMENT (OUTPATIENT)
Dept: FAMILY MEDICINE | Facility: CLINIC | Age: 66
End: 2021-08-19
Payer: MEDICARE

## 2021-08-19 VITALS
HEART RATE: 88 BPM | BODY MASS INDEX: 25 KG/M2 | SYSTOLIC BLOOD PRESSURE: 140 MMHG | WEIGHT: 124 LBS | HEIGHT: 59 IN | DIASTOLIC BLOOD PRESSURE: 70 MMHG

## 2021-08-19 VITALS — DIASTOLIC BLOOD PRESSURE: 80 MMHG | SYSTOLIC BLOOD PRESSURE: 122 MMHG

## 2021-08-19 DIAGNOSIS — F10.10 ALCOHOL ABUSE, UNCOMPLICATED: ICD-10-CM

## 2021-08-19 DIAGNOSIS — F17.210 NICOTINE DEPENDENCE, CIGARETTES, UNCOMPLICATED: ICD-10-CM

## 2021-08-19 DIAGNOSIS — F41.9 ANXIETY DISORDER, UNSPECIFIED: ICD-10-CM

## 2021-08-19 PROCEDURE — G0296 VISIT TO DETERM LDCT ELIG: CPT

## 2021-08-19 PROCEDURE — 99214 OFFICE O/P EST MOD 30 MIN: CPT | Mod: 25

## 2021-08-22 PROBLEM — F10.10 ALCOHOL ABUSE: Status: ACTIVE | Noted: 2021-08-22

## 2021-08-22 PROBLEM — F17.210 TOBACCO DEPENDENCE DUE TO CIGARETTES: Status: ACTIVE | Noted: 2021-08-22

## 2021-08-22 NOTE — REVIEW OF SYSTEMS
[Anxiety] : anxiety [Negative] : Neurological [Muscle Weakness] : no muscle weakness [Suicidal] : not suicidal [Depression] : no depression

## 2021-08-22 NOTE — PLAN
[FreeTextEntry1] : CT FOR LUNG CANCER SCREENING\par STRESS REDUCTION EXERCISES\par CONTINUE THERAPY AND PSYCHIATRIST FOLLOW-UP\par WILL RENEW PAROXETINE; PSYCHIATRIST TO RENEW IN THE FUTURE AS HE IS ADJUSTING HER MEDICATIONS\par DISCUSSED ALCOHOL USE AND RISKS OF CONTINUE USE\par DISCUSSED INPATIENT ALCOHOL REHAB PROGRAM - IS NOT INTERESTED AT THIS TIME\par DISCUSSED AA, ETC.\par PRIOR NOTES, IMAGING AND LABS REVIEWED\par FOLLOW-UP ALL SPECIALISTS AS DIRECTED \par CALL WITH ANY QUESTIONS, CONCERNS OR CHANGES \par FOLLOW-UP FOR CPE AND ROUTINE CARE

## 2021-08-22 NOTE — HISTORY OF PRESENT ILLNESS
[FreeTextEntry1] : F/U ANXIETY [de-identified] : MS. RUIZ IS A PLEASANT 67 YO PRESENTING FOR FOLLOW-UP.  JUST OVER A WEEK AGO HER DOG BOLTED AND STARTED CHASING OTHER DOGS.  SHE RAN AND PICKED HER UP AND SHE JUMPED AND KNOCKED HER BACK.  FELL ON HER LEFT HAND.  WENT TO ER AND HAD XRAY.  SAW ORTHOPEDICS LAST WEEK AND TO FOLLOW-UP TOMORROW.  PUT HER IN A BRACE.  DR. WOODSON [place].  ALSO NOTES THAT SIX WEEKS AGO SHE TRIPPED ON A .  FELL ON CEMENT STAIR AND BROKE TWO LEFT RIBS.  WILL BE HAVING NEXT BONE DENSITY THROUGH GYN.  ON ALEDRONATE.  CHRONIC HISTORY OF ANXIETY.  ON PAROXETINE 40 MG DAILY.  DEPRESSION OKAY.  NO SUICIDAL/HOMICIDAL IDEATIONS OR PLANS.  GETS ANXIOUS ON AND OFF.  SEEING PSYCHIATRIST.   ON GABAPENTIN NOW - JUST STARTED.  ALSO ATTENDING THERAPY INDIVIDUAL AND GROUP FOR ALCOHOL ABUSE.  REALIZED SHE HAD A PROBLEM AND THAT SHE WAS DRINKING TOO MUCH FOR MONTHS.  CUT DOWN TO TWO GLASSES OF WINE NOW.  HER PSYCHIATRIST REFERRED HER TO ATTEND ONE WEEK OF INPATIENT ALCOHOL REHAB BUT SHE HAS DECLINED.  IS AWARE OF THE RISKS OF ALCOHOL USE.  ALSO SAW CARDIOLOGY FOR PALPITATIONS.  TO HAVE HOLTER, STRESS TEST AND ECHOCARDIOGRAM DR. WOODSON IN [place].  FOLLOWING THYROID WITH HER ENDOCRINOLOGIST.  IS CONSIDERING QUITTING TOBACCO USE WHEN READY.  HAS PATCHES AT HOME.  AWARE OF THE RISKS OF TOBACCO USE.

## 2021-08-22 NOTE — PHYSICAL EXAM
[No Acute Distress] : no acute distress [Well-Appearing] : well-appearing [Normal Sclera/Conjunctiva] : normal sclera/conjunctiva [PERRL] : pupils equal round and reactive to light [No Lymphadenopathy] : no lymphadenopathy [Supple] : supple [No Respiratory Distress] : no respiratory distress  [No Accessory Muscle Use] : no accessory muscle use [Clear to Auscultation] : lungs were clear to auscultation bilaterally [Normal Rate] : normal rate  [Regular Rhythm] : with a regular rhythm [Speech Grossly Normal] : speech grossly normal [Normal S1, S2] : normal S1 and S2 [Normal Affect] : the affect was normal [Normal Mood] : the mood was normal

## 2021-09-01 ENCOUNTER — NON-APPOINTMENT (OUTPATIENT)
Age: 66
End: 2021-09-01

## 2021-09-01 VITALS — WEIGHT: 121 LBS | HEIGHT: 59 IN | BODY MASS INDEX: 24.39 KG/M2

## 2021-09-01 NOTE — DATA REVIEWED
[Lung Cancer Screening] : Patient underwent lung cancer screening [1] : 1 [TextBox_12] : 11/18 [TextBox_27] : 5/20

## 2021-09-01 NOTE — HISTORY OF PRESENT ILLNESS
[TextBox_13] : Referred by Dr. Shahana Dillon.\par \par Ms. BEARD is a 66 year old female with a history of basal and squamous cell skin CA.\par \par She was called to review eligibility for Low-Dose CT lung cancer screening.  Reviewed and confirmed that the patient meets screening eligibility criteria:\par \par 66 years old \par \par Smoking Status: Current Smoker\par \par Number of pack(s) per day: 1\par Number of years smoked: 36\par Number of pack years smokin\par \par Ms. BEARD denies any symptoms of lung cancer, including new cough, change in cough, hemoptysis, and unintentional weight loss.\par \par Ms. BEARD denies any personal history of lung cancer.  Admits to lung cancer in a first degree relative, her father.  Denies any history of lung disease or any history of occupational exposures.

## 2021-09-03 ENCOUNTER — APPOINTMENT (OUTPATIENT)
Dept: CT IMAGING | Facility: CLINIC | Age: 66
End: 2021-09-03
Payer: MEDICARE

## 2021-09-03 ENCOUNTER — OUTPATIENT (OUTPATIENT)
Dept: OUTPATIENT SERVICES | Facility: HOSPITAL | Age: 66
LOS: 1 days | End: 2021-09-03
Payer: MEDICARE

## 2021-09-03 DIAGNOSIS — Z87.891 PERSONAL HISTORY OF NICOTINE DEPENDENCE: ICD-10-CM

## 2021-09-03 DIAGNOSIS — K37 UNSPECIFIED APPENDICITIS: Chronic | ICD-10-CM

## 2021-09-03 DIAGNOSIS — Z98.890 OTHER SPECIFIED POSTPROCEDURAL STATES: Chronic | ICD-10-CM

## 2021-09-03 DIAGNOSIS — Z98.891 HISTORY OF UTERINE SCAR FROM PREVIOUS SURGERY: Chronic | ICD-10-CM

## 2021-09-03 PROCEDURE — 71271 CT THORAX LUNG CANCER SCR C-: CPT | Mod: 26

## 2021-09-03 PROCEDURE — 71271 CT THORAX LUNG CANCER SCR C-: CPT

## 2021-10-22 ENCOUNTER — TRANSCRIPTION ENCOUNTER (OUTPATIENT)
Age: 66
End: 2021-10-22

## 2022-01-09 ENCOUNTER — EMERGENCY (EMERGENCY)
Facility: HOSPITAL | Age: 67
LOS: 1 days | Discharge: HOME | End: 2022-01-09
Attending: EMERGENCY MEDICINE
Payer: MEDICARE

## 2022-01-09 VITALS
HEIGHT: 60 IN | DIASTOLIC BLOOD PRESSURE: 71 MMHG | SYSTOLIC BLOOD PRESSURE: 110 MMHG | OXYGEN SATURATION: 94 % | WEIGHT: 119.93 LBS | TEMPERATURE: 98 F | RESPIRATION RATE: 20 BRPM | HEART RATE: 103 BPM

## 2022-01-09 DIAGNOSIS — Z98.891 HISTORY OF UTERINE SCAR FROM PREVIOUS SURGERY: Chronic | ICD-10-CM

## 2022-01-09 DIAGNOSIS — K37 UNSPECIFIED APPENDICITIS: Chronic | ICD-10-CM

## 2022-01-09 DIAGNOSIS — Z98.890 OTHER SPECIFIED POSTPROCEDURAL STATES: Chronic | ICD-10-CM

## 2022-01-09 LAB — SARS-COV-2 RNA SPEC QL NAA+PROBE: SIGNIFICANT CHANGE UP

## 2022-01-09 PROCEDURE — 99284 EMERGENCY DEPT VISIT MOD MDM: CPT

## 2022-01-09 PROCEDURE — 71250 CT THORAX DX C-: CPT | Mod: 26,MA

## 2022-01-09 PROCEDURE — 99284 EMERGENCY DEPT VISIT MOD MDM: CPT | Mod: 25

## 2022-01-09 PROCEDURE — 73030 X-RAY EXAM OF SHOULDER: CPT | Mod: 26,RT

## 2022-01-09 PROCEDURE — 71250 CT THORAX DX C-: CPT | Mod: MA

## 2022-01-09 PROCEDURE — 73030 X-RAY EXAM OF SHOULDER: CPT

## 2022-01-09 PROCEDURE — U0005: CPT

## 2022-01-09 PROCEDURE — U0003: CPT

## 2022-01-09 RX ORDER — ACETAMINOPHEN 500 MG
650 TABLET ORAL ONCE
Refills: 0 | Status: COMPLETED | OUTPATIENT
Start: 2022-01-09 | End: 2022-01-09

## 2022-01-09 RX ADMIN — Medication 650 MILLIGRAM(S): at 17:27

## 2022-01-09 NOTE — ED STATDOCS - PATIENT PORTAL LINK FT
You can access the FollowMyHealth Patient Portal offered by [facility] by registering at the following website: [email or web address]. By joining [employer]’s FollowMyHealth portal, you will also be able to view your health information using other applications (apps) compatible with our system.

## 2022-01-09 NOTE — ED STATDOCS - CLINICAL SUMMARY MEDICAL DECISION MAKING FREE TEXT BOX
Patient s/p injury to right shoulder, chest and shoulder x-ray, scan chest to rule out clavicle and chest fracture.

## 2022-01-09 NOTE — ED ADULT TRIAGE NOTE - CHIEF COMPLAINT QUOTE
Right shoulder pain S/P mechanical fall on Friday, denies head injury/LOC, no blood thinners, resp even/unlabored.

## 2022-01-09 NOTE — ED STATDOCS - NSFOLLOWUPINSTRUCTIONS_ED_ALL_ED_FT
Alternate ice and heat. 20 minutes of each, 4 times a day for the next few days  Take over the counter Acetaminophen, 325 milligram pills. Take two every 4-6 hours for pain.  If pain not relieved, can take three of the over the counter ibuprofen pills( 200 milligrams per pill), every 6 hours. Take with food.  It is okay to take them together.  Your covid test results will go to your phone within the next 1-5 days, most likely tomorrow.  Until you have your result please quarentine and mask

## 2022-01-09 NOTE — ED STATDOCS - OBJECTIVE STATEMENT
65 y/o female with PMHx of Colitis, Hypothyroid, Osteopenia, and Skin cancer presents to the ED c/o fall. Patient reports right shoulder pain and bruising S/P mechanical fall 2 days ago. Patient was taking ibuprofen 600 mg for symptoms. Denies head injury/LOC, blood thinners, ability to lift right hand above waist. Allergic to Macrobid.

## 2022-01-12 ENCOUNTER — RX RENEWAL (OUTPATIENT)
Age: 67
End: 2022-01-12

## 2022-01-12 ENCOUNTER — NON-APPOINTMENT (OUTPATIENT)
Age: 67
End: 2022-01-12

## 2022-01-21 ENCOUNTER — APPOINTMENT (OUTPATIENT)
Dept: FAMILY MEDICINE | Facility: CLINIC | Age: 67
End: 2022-01-21

## 2022-02-28 ENCOUNTER — APPOINTMENT (OUTPATIENT)
Dept: OBGYN | Facility: CLINIC | Age: 67
End: 2022-02-28
Payer: MEDICARE

## 2022-02-28 VITALS
WEIGHT: 122 LBS | HEIGHT: 59 IN | SYSTOLIC BLOOD PRESSURE: 120 MMHG | BODY MASS INDEX: 24.6 KG/M2 | DIASTOLIC BLOOD PRESSURE: 80 MMHG

## 2022-02-28 PROCEDURE — 82270 OCCULT BLOOD FECES: CPT

## 2022-02-28 PROCEDURE — 99213 OFFICE O/P EST LOW 20 MIN: CPT

## 2022-02-28 NOTE — PHYSICAL EXAM
[Appropriately responsive] : appropriately responsive [Alert] : alert [No Acute Distress] : no acute distress [No Lymphadenopathy] : no lymphadenopathy [Regular Rate Rhythm] : regular rate rhythm [No Murmurs] : no murmurs [Clear to Auscultation B/L] : clear to auscultation bilaterally [Soft] : soft [Non-tender] : non-tender [Non-distended] : non-distended [No HSM] : No HSM [No Lesions] : no lesions [No Mass] : no mass [Oriented x3] : oriented x3 [Examination Of The Breasts] : a normal appearance [No Masses] : no breast masses were palpable [Labia Majora] : normal [Labia Minora] : normal [Atrophy] : atrophy [Normal] : normal [Uterine Adnexae] : normal [No Tenderness] : no tenderness [FreeTextEntry9] : lilly

## 2022-02-28 NOTE — HISTORY OF PRESENT ILLNESS
[FreeTextEntry1] : 67 yo pt here for gyn visit. had osteoporosis dxd in 2019, hoping risedronate use will show improvement on bone density. dw pt exercize, corry vit d. no probs w risedronate. having shoulder surgery soon.

## 2022-02-28 NOTE — DISCUSSION/SUMMARY
[FreeTextEntry1] : osteoporosis\par dexa ordered . risedronate renewed.\par nl gyn exam, atrophy. \par ref mammo\par fu 1 yr or prn.

## 2022-03-23 ENCOUNTER — TRANSCRIPTION ENCOUNTER (OUTPATIENT)
Age: 67
End: 2022-03-23

## 2022-03-24 ENCOUNTER — APPOINTMENT (OUTPATIENT)
Dept: ORTHOPEDIC SURGERY | Facility: CLINIC | Age: 67
End: 2022-03-24
Payer: MEDICARE

## 2022-03-24 VITALS
HEART RATE: 67 BPM | WEIGHT: 122 LBS | DIASTOLIC BLOOD PRESSURE: 90 MMHG | BODY MASS INDEX: 24.6 KG/M2 | SYSTOLIC BLOOD PRESSURE: 152 MMHG | HEIGHT: 59 IN

## 2022-03-24 PROCEDURE — 25605 CLTX DST RDL FX/EPHYS SEP W/: CPT | Mod: LT

## 2022-03-24 PROCEDURE — 99214 OFFICE O/P EST MOD 30 MIN: CPT | Mod: 57

## 2022-03-24 PROCEDURE — 73110 X-RAY EXAM OF WRIST: CPT | Mod: LT

## 2022-03-31 ENCOUNTER — APPOINTMENT (OUTPATIENT)
Dept: ORTHOPEDIC SURGERY | Facility: CLINIC | Age: 67
End: 2022-03-31
Payer: MEDICARE

## 2022-03-31 VITALS
SYSTOLIC BLOOD PRESSURE: 123 MMHG | WEIGHT: 122 LBS | DIASTOLIC BLOOD PRESSURE: 82 MMHG | HEART RATE: 85 BPM | HEIGHT: 59 IN | BODY MASS INDEX: 24.6 KG/M2

## 2022-03-31 PROCEDURE — 73110 X-RAY EXAM OF WRIST: CPT | Mod: LT

## 2022-03-31 PROCEDURE — 99024 POSTOP FOLLOW-UP VISIT: CPT

## 2022-04-03 NOTE — DISCUSSION/SUMMARY
[FreeTextEntry1] : 66 year old female with displaced left distal radius Colle's fracture (DOI 3/22/2022) now status post closed reduction and casting in the office a week ago, doing well\par \par -We discussed in detail the clinical and radiographic findings\par -We discussed the pathophysiology and natural history of patient's condition\par -Given the slight settling of her fracture, surgical and nonsurgical management options were discussed again in detail, patient still prefers nonoperative management and would like to continue cast immobilization\par -She is NWB in the wrist in the cast\par -She was advised to take over the counter medication for pain as needed if there is no contraindication. \par -She was advised to keep the UE elevated to help decrease swelling \par -We again discussed the importance of osteoporosis prevention with regular exercises and calcium and vitamin D supplementation.  She should follow up with her primary care physician regarding medical treatment of low bone mineral density. \par -We again discussed the adverse effect of nicotine on fracture healing and I encouraged her to stop using nicotine products or at least cut down. \par -I will see her in three weeks for repeat XRs of the left wrist (3 views) out of the cast, sooner as needed\par -Patient had the opportunity to ask questions and was in agreement with the plan\par -Over 50% of the time spent with the patient was on counseling the patient on the above diagnosis, treatment plan and prognosis. \par

## 2022-04-03 NOTE — PHYSICAL EXAM
[de-identified] : GENERAL: Awake, alert, cooperative, answers questions appropriately. No acute distress.  Ambulates independently with a normal gait.\par SKIN: Warm, dry, intact. Color and turgor normal. \par LUNGS: Demonstrates unlabored breathing on room air with no accessory muscle use.\par EXTREMITIES: Warm and well-perfused. \par NEUROLOGICAL: Grossly intact.\par \par FOCUSED UPPER EXTREMITY EXAM:\par \par LUE in SAC:\par -skin around the cast edges clean and dry\par -mild to moderate swelling along dorsal hand and fingers with ecchymosis improved compared with previous visit \par -normal finger cascade without malrotation\par -no tenderness to palpation along first dorsal wrist compartment, thumb extension intact\par -no tenderness to palpation at the base of the thumb\par -almost able to make full fist, slightly decreased AROM in all fingers due to swelling and stiffness, no scissoring\par -sensation intact in radial, ulnar, and median nerve distributions\par -Brisk capillary refill, fingers warm well perfused \par  [de-identified] : X-rays of left wrist (3 views) in the cast were obtained in Office today (03/31/2022), showing slight settling of the distal radius fracture with some dorsal tilt on the lateral and slight shortening.\par \par Previous X-rays of left wrist (3 views) after closed reduction and casting were obtained in Office (03/24/2022), showing good alignment of the fracture at neutral alignment on the lateral.\par \par Previous XRs of left wrist (3 views plus scaphoid view) from [facility] (3/23/2022) were reviewed with patient, showing displaced Colle's fracture with ulnar positive 4 mm, and dorsal tilt more than 20 degrees

## 2022-04-03 NOTE — HISTORY OF PRESENT ILLNESS
[FreeTextEntry1] : 03/31/2022\par Ms. CHARLIE BEARD returns for follow up of left distal radius Colle's fracture (DOI 3/22/22).  She tolerated the cast well, reports improved pain, denies paresthesia.\par \par 03/24/2022\par Ms. CHARLIE BEARD is a pleasant 66 year old female, LHD, retired desk worker.\par \par She presents to the office for evaluation of left wrist injury after trip and fall on 3/22/22.  She was seen at an urgent care center where imaging reportedly showed distal radius fracture.  She was placed in a volar splint and referred to our office for follow up. \par \par She denies prior injury.\par Currently her pain is intermittent, achy, without radiation.\par She denies paresthesia in the fingertips.\par She endorses night symptoms.\par Symptoms improve with rest and immobilization and OTC pain medication.\par Symptoms worsen with activity.\par  \par She is not diabetic.\par She denies history of thyroid disease.\par She endorses nicotine use about half a pack per day.\par She denies recreational drug use.\par She does not take any narcotic pain medication.\par

## 2022-04-06 ENCOUNTER — APPOINTMENT (OUTPATIENT)
Dept: DERMATOLOGY | Facility: CLINIC | Age: 67
End: 2022-04-06
Payer: MEDICARE

## 2022-04-06 ENCOUNTER — EMERGENCY (EMERGENCY)
Facility: HOSPITAL | Age: 67
LOS: 1 days | Discharge: HOME | End: 2022-04-06
Attending: EMERGENCY MEDICINE
Payer: MEDICARE

## 2022-04-06 VITALS
TEMPERATURE: 98 F | HEART RATE: 84 BPM | HEIGHT: 60 IN | SYSTOLIC BLOOD PRESSURE: 111 MMHG | DIASTOLIC BLOOD PRESSURE: 75 MMHG | RESPIRATION RATE: 20 BRPM | WEIGHT: 123.02 LBS | OXYGEN SATURATION: 98 %

## 2022-04-06 DIAGNOSIS — K37 UNSPECIFIED APPENDICITIS: Chronic | ICD-10-CM

## 2022-04-06 DIAGNOSIS — Z98.891 HISTORY OF UTERINE SCAR FROM PREVIOUS SURGERY: Chronic | ICD-10-CM

## 2022-04-06 DIAGNOSIS — Z98.890 OTHER SPECIFIED POSTPROCEDURAL STATES: Chronic | ICD-10-CM

## 2022-04-06 PROCEDURE — 99284 EMERGENCY DEPT VISIT MOD MDM: CPT

## 2022-04-06 PROCEDURE — 99214 OFFICE O/P EST MOD 30 MIN: CPT | Mod: 25

## 2022-04-06 PROCEDURE — 99283 EMERGENCY DEPT VISIT LOW MDM: CPT | Mod: FS

## 2022-04-06 PROCEDURE — 17003 DESTRUCT PREMALG LES 2-14: CPT

## 2022-04-06 PROCEDURE — 17000 DESTRUCT PREMALG LESION: CPT

## 2022-04-06 NOTE — ED PROVIDER NOTE - NS ED ATTENDING STATEMENT MOD
This was a shared visit with the MARJORIE. I reviewed and verified the documentation and independently performed the documented:

## 2022-04-06 NOTE — ED PROVIDER NOTE - OBJECTIVE STATEMENT
66 year old female presents to ED c/o hand numbness s/p radial fracture with splint in place. had cast placed 2 weeks ago in office with Dr. Corona. Had follow up appointment last week. Numbness to 3rd and 4th fingers starting yesterday. States has been using the hand very frequently typing. denies additional injury, decrease in motor.

## 2022-04-06 NOTE — ED PROVIDER NOTE - PATIENT PORTAL LINK FT
You can access the FollowMyHealth Patient Portal offered by [facility] by registering at the following website: [email or web address]. By joining [employer]’s FollowMyHealth portal, you will also be able to view your health information using other applications (apps) compatible with our system.

## 2022-04-06 NOTE — ED PROVIDER NOTE - CARE PROVIDER_API CALL
Ana Lilia Corona)  Orthopedics  [address] [place] [address], [address]  [place] [address]  Phone: [phone]  Fax: [phone]  Follow Up Time:

## 2022-04-06 NOTE — ED ADULT TRIAGE NOTE - CHIEF COMPLAINT QUOTE
Pt states " I broke my wrist two weeks ago and for the past three days my L hand fingers are feeling numb" Pt was told by orthopedic team to come to ED for eval

## 2022-04-06 NOTE — CONSULT NOTE ADULT - SUBJECTIVE AND OBJECTIVE BOX
ORTHO-HAND SERVICE    Pt Name: CHARLIE BEARD    MRN: [id number]      Patient is a 66y Female presenting to the emergency department with a chief complaint of tingling, decreased sensation to left digits x 2 days. Patient states she fractured her left wrist 2 weeks ago. Went to  where she was splinted and then referred to Dr. Corona. Patient was casted in Dr. Corona's office 2 weeks ago. Most recent follow-up appt was last wednesday 3/30. Patient now c/o tingling and decreased sensation to 2nd through 4th digits that started 2 days ago. Patient was told to come to ED for further evaluation. States she feels some discomfort over volar aspect of wrist. Patient is able to range digits. Patient states she has been using the hand to type on keyboard and hasn't been following the non-weight bearing requirements. No other orthopedic complaints.           REVIEW OF SYSTEMS    General: Alert, responsive, in NAD    Skin/Breast: No rashes, no pruritis     Respiratory and Thorax: No difficulty breathing. No cough.  	   Cardiovascular: No chest pain. No palpitations.    Gastrointestinal: No abdominal pain. No diarrhea.     Musculoskeletal: SEE HPI.    Neurological: No sensory or motor changes.     ROS is otherwise negative.      PAST MEDICAL & SURGICAL HISTORY:  PAST MEDICAL & SURGICAL HISTORY:  Hypothyroid  Currently not on any medication. Last thyroid study/ lab work done 2018.    Osteopenia    Colitis    Skin cancer, basal cell  facial- surgery done  ?    Sleep apnea, unspecified type    Bursal cyst  right hand    Appendicitis  appendectomy     History of    and     H/O hand surgery  left thumb open reduction         Allergies: Macrobid (Hives; Anaphylaxis)  sulfa drugs (Unknown)      Medications:     FAMILY HISTORY:  Family history of hypertension in mother (Mother, Father)    Family history of COPD (chronic obstructive pulmonary disease) (Mother)    Family history of lung cancer (Father)    : non-contributory    Social History:   Social History:      Daily Height in cm: 152.4 (2022 17:40)    Daily         PHYSICAL EXAM:    Appearance: Alert, responsive, in no acute distress.    Skin: no rash on visible skin. Skin is clean, dry and intact. No bleeding. No abrasions. No ulcerations.    Musculoskeletal:         Left Upper Extremity: +cast in place to left wrist. forearm compartments soft. 2 fingers able to fit inside cast proximally.  FROM digits, elbow and shoulder. +OK sign/fingers crossed and thumbs up. SILT, but decreased sensation to 2nd through 4th digits. cap refill < 2 seconds. No cyanosis.        A/P:  Pt is a  66y Female s/p cast placement for distal radius fracture with carpal tunnel syndrome likely secondary to overuse in cast. Patient noting immediate improvement after bivalve.    PLAN:   * cast bivalved in ED, ACE wrap placed around cast  * encourage elevation  * follow-up with Dr. Corona in office next Wednesday  * instructed on return to ED precautions including worsening numbness/tingling, severe swelling, cyanosis, increased firmness to forearm,etc. Patient understands and agrees with plan.

## 2022-04-06 NOTE — ED PROVIDER NOTE - NSFOLLOWUPINSTRUCTIONS_ED_ALL_ED_FT
Avoid overuse of hand  follow up with Dr. Corona as you have planned   Take tylenol every 6 hours for pain     return if new or worsening symptoms

## 2022-04-06 NOTE — ED PROVIDER NOTE - CLINICAL SUMMARY MEDICAL DECISION MAKING FREE TEXT BOX
66 year old female presents to ED c/o hand numbness s/p radial fracture with splint in place.   - ortho, bivalve, ortho follow up

## 2022-04-09 DIAGNOSIS — R20.0 ANESTHESIA OF SKIN: ICD-10-CM

## 2022-04-09 DIAGNOSIS — Z88.1 ALLERGY STATUS TO OTHER ANTIBIOTIC AGENTS STATUS: ICD-10-CM

## 2022-04-09 DIAGNOSIS — Z88.2 ALLERGY STATUS TO SULFONAMIDES: ICD-10-CM

## 2022-04-09 NOTE — BEGINNING OF VISIT
[Patient] : patient patient complaints covid + on 4/2  Patient reports generalized body aches, weakness, and chest pain.  Patient OSat=99% on room air and no fever noted in triage.

## 2022-04-11 ENCOUNTER — APPOINTMENT (OUTPATIENT)
Dept: OBGYN | Facility: CLINIC | Age: 67
End: 2022-04-11
Payer: MEDICARE

## 2022-04-11 VITALS
WEIGHT: 124 LBS | SYSTOLIC BLOOD PRESSURE: 120 MMHG | BODY MASS INDEX: 25 KG/M2 | HEIGHT: 59 IN | DIASTOLIC BLOOD PRESSURE: 80 MMHG

## 2022-04-11 DIAGNOSIS — M81.0 AGE-RELATED OSTEOPOROSIS W/OUT CURRENT PATHOLOGICAL FRACTURE: ICD-10-CM

## 2022-04-11 DIAGNOSIS — N64.4 MASTODYNIA: ICD-10-CM

## 2022-04-11 PROCEDURE — 99214 OFFICE O/P EST MOD 30 MIN: CPT

## 2022-04-13 ENCOUNTER — APPOINTMENT (OUTPATIENT)
Dept: ORTHOPEDIC SURGERY | Facility: CLINIC | Age: 67
End: 2022-04-13
Payer: MEDICARE

## 2022-04-13 VITALS
DIASTOLIC BLOOD PRESSURE: 70 MMHG | HEART RATE: 94 BPM | SYSTOLIC BLOOD PRESSURE: 103 MMHG | BODY MASS INDEX: 25 KG/M2 | WEIGHT: 124 LBS | HEIGHT: 59 IN

## 2022-04-13 PROCEDURE — 99024 POSTOP FOLLOW-UP VISIT: CPT

## 2022-04-13 PROCEDURE — 73110 X-RAY EXAM OF WRIST: CPT | Mod: LT

## 2022-04-13 NOTE — PHYSICAL EXAM
[de-identified] : GENERAL: Awake, alert, cooperative, answers questions appropriately. No acute distress.\par SKIN: Warm, dry, intact. Color and turgor normal. \par LUNGS: Demonstrates unlabored breathing on room air with no accessory muscle use.\par EXTREMITIES: Warm and well-perfused. \par NEUROLOGICAL: Grossly intact.\par \par FOCUSED UPPER EXTREMITY EXAM:\par \par LUE SAC removed for XRs:\par -skin intact without any erythema\par -resolving ecchymosis in wrist, with mild swelling in wrist, dorsal hand and fingers\par -normal finger cascade without malrotation\par -no thenar atrophy; no intrinsics wasting\par -minimal tenderness to palpation dorsal distal radius with palpable step off, no motion at the fracture site\par -no tenderness to palpation over ulnar styloid\par -no tenderness to palpation along first dorsal wrist compartment, thumb extension intact\par -no tenderness to palpation at the base of the thumb\par -no tenderness to palpation at the anatomic snuffbox\par -no tenderness to palpation in the SL interval\par -able to make full fist, improved AROM in all fingers but still some stiffness, no scissoring\par -decreased wrist ROM due to stiffness\par -DRUJ stable\par -sensation intact in radial, ulnar, and slightly decreased median nerve distributions\par -Brisk capillary refill, fingers warm well perfused \par  [de-identified] : X-rays of left cast (3 views) out of the cast were obtained in Office today (04/13/2022) and reviewed with patient, showing healing of Colle's fracture with no significant change in alignment compared to last visit\par \par Previous X-rays of left wrist (3 views) in the cast were obtained in Office (03/31/2022), showing slight settling of the distal radius fracture with some dorsal tilt on the lateral and slight shortening.\par \par Previous X-rays of left wrist (3 views) after closed reduction and casting were obtained in Office (03/24/2022), showing good alignment of the fracture at neutral alignment on the lateral.\par \par Previous XRs of left wrist (3 views plus scaphoid view) from [facility] (3/23/2022) were reviewed with patient, showing displaced Colle's fracture with ulnar positive 4 mm, and dorsal tilt more than 20 degrees

## 2022-04-13 NOTE — DISCUSSION/SUMMARY
[FreeTextEntry1] : 66 year old female with displaced left distal radius Colle's fracture (DOI 3/22/2022) now status post closed reduction and casting in the office a week ago, healing; new onset of paresthesia in left middle and ring fingers\par \par -We discussed in detail the clinical and radiographic findings\par -We discussed the pathophysiology and natural history of patient's condition\par -I transitioned her into a fracture brace for night wear and strenuous activities for another one to two weeks before weaning off.  She should come out of the brace for wrist AROM and hygiene.\par -I referred her to therapy to work on ROM, pain control, edema control, modalities, home exercises.  WBAT after six weeks from the time of injury.  Strengthening can start after eight weeks from the time of injury.\par -I prescribed a Medrol dose pack for her symptoms.\par -She was advised to take over the counter medication for pain as needed if there is no contraindication. \par -She was advised to keep the UE elevated to help decrease swelling \par -We discussed that her paresthesia might be related to swelling vs flare-up of an underlying CTS that was not previously diagnosed\par -I will see her in six weeks, sooner as needed\par -Patient had the opportunity to ask questions and was in agreement with the plan\par -Over 50% of the time spent with the patient was on counseling the patient on the above diagnosis, treatment plan and prognosis. \par

## 2022-04-16 NOTE — DISCUSSION/SUMMARY
[FreeTextEntry1] : osteoporosis at aleft fem neck. \par taking reisedronate, dw pt vit d, corry, exerise, fall prevention.\par breast pain- recent mammo neg, check us. \par dec caffeine, start vit E, cabbage in diet.\par spent 30 min in consultation.

## 2022-04-27 ENCOUNTER — APPOINTMENT (OUTPATIENT)
Dept: ORTHOPEDIC SURGERY | Facility: CLINIC | Age: 67
End: 2022-04-27

## 2022-04-28 ENCOUNTER — APPOINTMENT (OUTPATIENT)
Dept: DERMATOLOGY | Facility: CLINIC | Age: 67
End: 2022-04-28
Payer: MEDICARE

## 2022-04-28 PROCEDURE — 99213 OFFICE O/P EST LOW 20 MIN: CPT | Mod: 25

## 2022-04-28 PROCEDURE — 17261 DSTRJ MAL LES T/A/L .6-1.0CM: CPT | Mod: 76

## 2022-05-04 ENCOUNTER — APPOINTMENT (OUTPATIENT)
Dept: ORTHOPEDIC SURGERY | Facility: CLINIC | Age: 67
End: 2022-05-04
Payer: MEDICARE

## 2022-05-04 VITALS
HEART RATE: 71 BPM | BODY MASS INDEX: 25 KG/M2 | SYSTOLIC BLOOD PRESSURE: 164 MMHG | WEIGHT: 124 LBS | HEIGHT: 59 IN | DIASTOLIC BLOOD PRESSURE: 86 MMHG

## 2022-05-04 DIAGNOSIS — R20.2 PARESTHESIA OF SKIN: ICD-10-CM

## 2022-05-04 PROCEDURE — 99024 POSTOP FOLLOW-UP VISIT: CPT

## 2022-05-04 PROCEDURE — 99214 OFFICE O/P EST MOD 30 MIN: CPT | Mod: 24

## 2022-05-08 NOTE — HISTORY OF PRESENT ILLNESS
[FreeTextEntry1] : 05/04/2022 \par Ms. CHARLIE BEARD returns for follow up of left distal radius Colle's fracture (DOI 3/22/22).  She had not started therapy as her shoulder therapy was still on going, and her left middle and ring fingers were still feeling numb.  She also noted swelling in the ulnar wrist with decreased ROM.\par \par 04/13/2022\par Ms. CHARLIE BEARD returns for follow up of left distal radius Colle's fracture (DOI 3/22/22).  She had to go to the ED to have the cast univalved due to paresthesia in her middle and ring fingers.  She still feels that the middle and ring fingers get intermittently numb even though the cast was univalved.  She reports minimal pain in the wrist.\par \par 03/31/2022\par Ms. CHARLIE BEARD returns for follow up of left distal radius Colle's fracture (DOI 3/22/22).  She tolerated the cast well, reports improved pain, denies paresthesia.\par \par 03/24/2022\par Ms. CHARLIE BEARD is a pleasant 66 year old female, LHD, retired desk worker.\par \par She presents to the office for evaluation of left wrist injury after trip and fall on 3/22/22.  She was seen at an urgent care center where imaging reportedly showed distal radius fracture.  She was placed in a volar splint and referred to our office for follow up. \par \par She denies prior injury.\par Currently her pain is intermittent, achy, without radiation.\par She denies paresthesia in the fingertips.\par She endorses night symptoms.\par Symptoms improve with rest and immobilization and OTC pain medication.\par Symptoms worsen with activity.\par  \par She is not diabetic.\par She denies history of thyroid disease.\par She endorses nicotine use about half a pack per day.\par She denies recreational drug use.\par She does not take any narcotic pain medication.\par

## 2022-05-08 NOTE — DISCUSSION/SUMMARY
[FreeTextEntry1] : 66 year old female with displaced left distal radius Colle's fracture (DOI 3/22/2022) now status post closed reduction and casting in the office, healing with dorsal angulation and shortening; new onset of paresthesia in left middle and ring fingers\par \par -We discussed in detail the clinical and radiographic findings\par -We discussed the pathophysiology and natural history of patient's condition\par -I recommended activity modification and bracing for comfort and/or strenuous activities as needed\par -We discussed that her wrist has healed with dorsal angulation and shortening, which was a result of the dorsal cortex comminution that settled in the cast\par -we discussed that her paresthesia might have been from swelling from her injury, or from previously undiagnosed carpal tunnel syndrome\par -I would like to obtain EMG of RUE to evaluate for compressive neuropathy and will bring her back to discuss the results. \par -She would like to defer therapy at the moment as she is still in therapy for her shoulder, and she also wants to find out more about her nerve symptoms\par -I prescribed transdermal pain gel BID as needed for symptom relief\par -I prescribed a short course of meloxicam daily for two weeks.  We discussed the GI and renal side effects of NSAIDs.\par -I also provided her with educational material and/or home exercises when available and/or appropriate\par -We discussed the uncertain diagnosis and prognosis of her condition, and possible need for future surgery\par -I will see her in six weeks, sooner as needed\par -Patient had the opportunity to ask questions and was in agreement with the plan\par -Over 50% of the time spent with the patient was on counseling the patient on the above diagnosis, treatment plan and prognosis. \par

## 2022-05-08 NOTE — PHYSICAL EXAM
[de-identified] : GENERAL: Awake, alert, cooperative, answers questions appropriately. No acute distress.\par SKIN: Warm, dry, intact. Color and turgor normal. \par LUNGS: Demonstrates unlabored breathing on room air with no accessory muscle use.\par EXTREMITIES: Warm and well-perfused. \par NEUROLOGICAL: Grossly intact.\par \par FOCUSED UPPER EXTREMITY EXAM:\par \par LUE:\par -skin intact without any erythema\par -no apparent ecchymosis in wrist, with mild swelling in ulnar wrist\par -normal finger cascade without malrotation\par -no thenar atrophy; no intrinsics wasting\par -minimal tenderness to palpation dorsal distal radius with palpable step off, no motion at the fracture site\par -no tenderness to palpation over ulnar styloid\par -no tenderness to palpation along first dorsal wrist compartment, thumb extension intact\par -no tenderness to palpation at the base of the thumb\par -no tenderness to palpation at the anatomic snuffbox\par -no tenderness to palpation in the SL interval\par -able to make full fist, good AROM in all fingers, no scissoring\par -decreased wrist ROM due to stiffness\par -DRUJ stable\par -no thenar atrophy; no intrinsics wasting; 5/5 strength thumb opposition; 5/5 strength intrinsics\par -positive carpal tunnel compression test, positive Phalen's, negative Tinel's at the wrist\par -sensation grossly intact in the radial and ulnar nerve distributions, and decreased in the median nerve distribution\par -Brisk capillary refill, fingers warm well perfused \par  [de-identified] : No new XRs were taken during today's visit.\par \par Previous X-rays of left cast (3 views) out of the cast were obtained in Office (04/13/2022) and reviewed with patient, showing healing of Colle's fracture with no significant change in alignment compared to last visit\par \par Previous X-rays of left wrist (3 views) in the cast were obtained in Office (03/31/2022), showing slight settling of the distal radius fracture with some dorsal tilt on the lateral and slight shortening.\par \par Previous X-rays of left wrist (3 views) after closed reduction and casting were obtained in Office (03/24/2022), showing good alignment of the fracture at neutral alignment on the lateral.\par \par Previous XRs of left wrist (3 views plus scaphoid view) from [facility] (3/23/2022) were reviewed with patient, showing displaced Colle's fracture with ulnar positive 4 mm, and dorsal tilt more than 20 degrees

## 2022-05-20 ENCOUNTER — TRANSCRIPTION ENCOUNTER (OUTPATIENT)
Age: 67
End: 2022-05-20

## 2022-06-10 ENCOUNTER — APPOINTMENT (OUTPATIENT)
Dept: PHYSICAL MEDICINE AND REHAB | Facility: CLINIC | Age: 67
End: 2022-06-10
Payer: MEDICARE

## 2022-06-10 PROCEDURE — 95908 NRV CNDJ TST 3-4 STUDIES: CPT

## 2022-06-10 PROCEDURE — 95885 MUSC TST DONE W/NERV TST LIM: CPT | Mod: LT

## 2022-06-29 ENCOUNTER — APPOINTMENT (OUTPATIENT)
Dept: PHYSICAL MEDICINE AND REHAB | Facility: CLINIC | Age: 67
End: 2022-06-29

## 2022-06-29 PROCEDURE — 76942 ECHO GUIDE FOR BIOPSY: CPT

## 2022-06-29 PROCEDURE — 99214 OFFICE O/P EST MOD 30 MIN: CPT | Mod: 25

## 2022-06-29 PROCEDURE — 20526 THER INJECTION CARP TUNNEL: CPT | Mod: LT

## 2022-06-29 NOTE — DATA REVIEWED
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

## 2022-06-29 NOTE — PHYSICAL EXAM
[FreeTextEntry1] : NAD\par A&Ox3\par Left Wrist Deformity 2' old TOM fx\par Thenar eminence atrophy\par Phalen's +\par Tinel's +\par MMT 4/5 L APB\par SILT.  Decr PP MN distribution

## 2022-06-29 NOTE — ASSESSMENT
[FreeTextEntry1] : 67 y.o. 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  I spent most of today's office visit (30 min) reviewing the patient's EMG, reviewing and performing the MSK US examination and hydrodissection left median nerve, discussing pathogenesis and further non-operative vs. operative management.  Taken with the patient's EDX data, I have recommended the patient re-consult Ortho Hand MD for consideration of formal carpal tunnel release surgery as there are no good non-operative management measures for advanced CTS.  Pt. is in agreement with plan.  All questions answered.  RTC prn.\par

## 2022-06-29 NOTE — HISTORY OF PRESENT ILLNESS
[FreeTextEntry1] : 67 y.o. F returns to office for NCS review and MSK US examination median nerve left wrist.  Results detailed below.

## 2022-06-29 NOTE — PROCEDURE
[de-identified] : US EXAMINATION LEFT MEDIAN NERVE\par \par CSA LEFT MEDIAN NERVE CARPAL TUNNEL INLET: BIFID MN + PMA 5 mm2 + 6 mm2 = 11 mm2 (borderline enlarged)\par CSA LEFT MEDIAN NERVE CARPAL TUNNEL OUTLET: 9 mm2 (borderline compressed)\par DIAMETER LEFT MEDIAN NERVE LONGITUDINAL ACROSS CARPAL TUNNEL: 17 mm w/o notch\par \par Reason for procedure: CARPAL TUNNEL SYNDROME\par \par Procedure: \par 1. LEFT MEDIAN NERVE HYDRODISSECTION AT CARPAL TUNNEL\par 2. US GUIDANCE\par \par Physician: RICK MATTHEW D.O.\par Medication injected: 20 mg TRIAMCINOLONE, 2.5 cc Lidocaine 1% (TOTAL)\par Sedation medications: None\par Estimated blood loss: None\par Complications: None\par \par Technique: R/B/A to US guided median nerve hydrodissection at the carpal tunnel reviewed with patient.  Patient is agreeable and wishes to proceed.  Signed consent form to be scanned into EMR.  Given the patient's wrist deformity, she was placed in PRONE position with the wrist hyperextended and supinated. Ultrasound evaluation demonstrated the median nerve in long axis as it crossed the carpal tunnel along with the underlying tendons and vasculature. The area was prepped in normal sterile fashion with Chloroprep x3.  A 27 gauge, 1.25 inch needle was advanced toward and just superficial to the hyper/hypoechoic structure with US guidance using a gel standoff.   After negative aspiration of heme, the above medications were injected dissecting the median nerve away from the overlying TCL. Needle was then removed and a bandaid placed over injection site. There were no complications.  The patient was provided with post injection instructions and noted improvement in pain after injection. \par \par CSA ___ MEDIAN NERVE AT ___ CARPAL INLET MEASURED ___\par \par

## 2022-07-01 ENCOUNTER — APPOINTMENT (OUTPATIENT)
Dept: ORTHOPEDIC SURGERY | Facility: CLINIC | Age: 67
End: 2022-07-01

## 2022-07-01 VITALS
HEIGHT: 59 IN | DIASTOLIC BLOOD PRESSURE: 88 MMHG | TEMPERATURE: 97.9 F | SYSTOLIC BLOOD PRESSURE: 140 MMHG | HEART RATE: 71 BPM | WEIGHT: 121 LBS | BODY MASS INDEX: 24.39 KG/M2

## 2022-07-01 PROCEDURE — 99214 OFFICE O/P EST MOD 30 MIN: CPT

## 2022-07-05 ENCOUNTER — APPOINTMENT (OUTPATIENT)
Dept: FAMILY MEDICINE | Facility: CLINIC | Age: 67
End: 2022-07-05

## 2022-07-05 ENCOUNTER — NON-APPOINTMENT (OUTPATIENT)
Age: 67
End: 2022-07-05

## 2022-07-05 VITALS
OXYGEN SATURATION: 97 % | SYSTOLIC BLOOD PRESSURE: 124 MMHG | DIASTOLIC BLOOD PRESSURE: 70 MMHG | BODY MASS INDEX: 24.19 KG/M2 | WEIGHT: 120 LBS | HEART RATE: 86 BPM | HEIGHT: 59 IN

## 2022-07-05 DIAGNOSIS — Z87.891 PERSONAL HISTORY OF NICOTINE DEPENDENCE: ICD-10-CM

## 2022-07-05 DIAGNOSIS — R91.1 SOLITARY PULMONARY NODULE: ICD-10-CM

## 2022-07-05 PROCEDURE — G0438: CPT

## 2022-07-05 PROCEDURE — 99406 BEHAV CHNG SMOKING 3-10 MIN: CPT

## 2022-07-05 PROCEDURE — G0296 VISIT TO DETERM LDCT ELIG: CPT

## 2022-07-05 RX ORDER — MULTIVIT-MIN/IRON/FOLIC ACID/K 18-600-40
400 CAPSULE ORAL
Refills: 0 | Status: DISCONTINUED | COMMUNITY
End: 2022-07-05

## 2022-07-05 RX ORDER — PHENYLEPHRINE HCL 10 MG
7 TABLET ORAL DAILY
Qty: 1 | Refills: 2 | Status: DISCONTINUED | COMMUNITY
Start: 2020-05-15 | End: 2022-07-05

## 2022-07-05 RX ORDER — DICLOFENAC SODIUM 1% 10 MG/G
1 GEL TOPICAL
Qty: 1 | Refills: 0 | Status: DISCONTINUED | COMMUNITY
Start: 2022-05-04 | End: 2022-07-05

## 2022-07-05 RX ORDER — PREGABALIN 25 MG/1
25 CAPSULE ORAL TWICE DAILY
Refills: 0 | Status: DISCONTINUED | COMMUNITY
End: 2022-07-05

## 2022-07-05 RX ORDER — METHYLPREDNISOLONE 4 MG/1
4 TABLET ORAL
Qty: 1 | Refills: 0 | Status: DISCONTINUED | COMMUNITY
Start: 2022-04-13 | End: 2022-07-05

## 2022-07-09 NOTE — REVIEW OF SYSTEMS
Ear Wedge Repair Text: A wedge excision was completed by carrying down an excision through the full thickness of the ear and cartilage with an inward facing Burow's triangle. The wound was then closed in a layered fashion. [Joint Pain] : joint pain [Negative] : Heme/Lymph

## 2022-07-09 NOTE — PHYSICAL EXAM
[No Acute Distress] : no acute distress [Well-Appearing] : well-appearing [Normal Sclera/Conjunctiva] : normal sclera/conjunctiva [PERRL] : pupils equal round and reactive to light [Normal Outer Ear/Nose] : the outer ears and nose were normal in appearance [Normal Oropharynx] : the oropharynx was normal [Supple] : supple [No Lymphadenopathy] : no lymphadenopathy [No Respiratory Distress] : no respiratory distress  [No Accessory Muscle Use] : no accessory muscle use [Clear to Auscultation] : lungs were clear to auscultation bilaterally [Normal Rate] : normal rate  [Regular Rhythm] : with a regular rhythm [Normal S1, S2] : normal S1 and S2 [Soft] : abdomen soft [Non Tender] : non-tender [Normal Bowel Sounds] : normal bowel sounds [No Joint Swelling] : no joint swelling [Grossly Normal Strength/Tone] : grossly normal strength/tone [Coordination Grossly Intact] : coordination grossly intact [No Focal Deficits] : no focal deficits [Normal Gait] : normal gait [Deep Tendon Reflexes (DTR)] : deep tendon reflexes were 2+ and symmetric [Speech Grossly Normal] : speech grossly normal [Normal Affect] : the affect was normal [Normal Mood] : the mood was normal

## 2022-07-09 NOTE — HISTORY OF PRESENT ILLNESS
[FreeTextEntry1] : wellness exam [de-identified] : MS. BEARD IS A PLEASANT 68 YO PRESENTING FOR YEARLY WELLNESS EXAM.  +TOBACCO USE.  HAS BEEN SMOKING ON AND OFF SINCE COLLEGE.  HAS TRIED TO QUIT THROUGH HYPNOSIS AND PATCHES IN THE PAST.  NOT READY AT THIS TIME.  ATTENDING AN ALCOHOL SUPPORT GROUP ONCE A WEEK.  ALSO HAS A ONE ON ONE COUNSELOR.  IS NOT INTERESTED IN REHAB PROGRAMS AT THIS TIME.\par \par PAIN MANAGEMENT: HIP AND BACK; TRIED STEROID INJECTIONS; DR. PARK\par DERMATOLOGY: DR. LLANES; SCREENING\par endocrinology: dr. perlman - thyroid nodule

## 2022-07-09 NOTE — PLAN
[FreeTextEntry1] : COLONOSCOPY\par SMOKING CESSATION COUNSELLING PROVIDED FOR FOUR MINUTES.  DISCUSSED RISKS OF TOBACCO USE AND METHODS TO QUIT.  DISCUSSED SMOKING CESSATION PROGRAM AT [facility] \par MONITOR SCREENING CT CHEST\par VISION SCREENING\par SAFETY / HEALTHY HABITS REVIEWED\par HEALTHY DIET AND LIFESTYLE MODIFICATIONS\par VACCINATIONS DISCUSSED: CONSIDERING PNEUMONIA VACCINATION \par CHECK ROUTINE LAB WORK\par FOLLOW-UP ALL SPECIALISTS AS DIRECTED\par CALL WITH ANY QUESTIONS, CONCERNS OR CHANGES

## 2022-07-09 NOTE — COUNSELING
[ - Annual Lung Cancer Screening/Share Decision Making Discussion] : Annual Lung Cancer Screening/Share Decision Making Discussion. (I have advised this patient to have a Low Dose CT (LDCT) scan of the lungs and have discussed the following with the patient in a shared decision making discussion:   Benefits of Detection and Early Treatment: There is adequate evidence that annual screening for lung cancer with LDCT in a population of high-risk persons can prevent a substantial number of lung cancer–related deaths. The magnitude of benefit depends on the individual patient's risk for lung cancer, as those who are at highest risk are most likely to benefit. Screening cannot prevent most lung cancer–related deaths, and does not replace smoking cessation. Harms of Detection and Early Intervention and Treatment: The harms associated with LDCT screening include false-negative and false-positive results, incidental findings, over diagnosis, and radiation exposure. False-positive LDCT results occur in a substantial proportion of screened persons; 95% of all positive results do not lead to a diagnosis of cancer. In a high-quality screening program, further imaging can resolve most false-positive results; however, some patients may require invasive procedures. Radiation harms, including cancer resulting from cumulative exposure to radiation, vary depending on the age at the start of screening; the number of scans received; and the person's exposure to other sources of radiation, particularly other medical imaging.) [Yes] : Risk of tobacco use and health benefits of smoking cessation discussed: Yes [Cessation strategies including cessation program discussed] : Cessation strategies including cessation program discussed [Use of nicotine replacement therapies and other medications discussed] : Use of nicotine replacement therapies and other medications discussed [No] : Not willing to quit smoking [FreeTextEntry1] : 4

## 2022-07-09 NOTE — HEALTH RISK ASSESSMENT
[Very Good] : ~his/her~  mood as very good [Intercurrent ED visits] : went to ED [Current] : Current [20 or more] : 20 or more [Any fall with injury in past year] : Patient reported fall with injury in the past year [0] : 2) Feeling down, depressed, or hopeless: Not at all (0) [PHQ-2 Negative - No further assessment needed] : PHQ-2 Negative - No further assessment needed [HIV test declined] : HIV test declined [Hepatitis C test declined] : Hepatitis C test declined [None] : None [With Family] : lives with family [# of Members in Household ___] :  household currently consist of [unfilled] member(s) [Retired] : retired [College] : College [] :  [# Of Children ___] : has [unfilled] children [Feels Safe at Home] : Feels safe at home [Fully functional (bathing, dressing, toileting, transferring, walking, feeding)] : Fully functional (bathing, dressing, toileting, transferring, walking, feeding) [Fully functional (using the telephone, shopping, preparing meals, housekeeping, doing laundry, using] : Fully functional and needs no help or supervision to perform IADLs (using the telephone, shopping, preparing meals, housekeeping, doing laundry, using transportation, managing medications and managing finances) [Reports normal functional visual acuity (ie: able to read med bottle)] : Reports normal functional visual acuity [Smoke Detector] : smoke detector [Carbon Monoxide Detector] : carbon monoxide detector [Safety elements used in home] : safety elements used in home [Seat Belt] :  uses seat belt [Sunscreen] : uses sunscreen [With Patient/Caregiver] : , with patient/caregiver [Designated Healthcare Proxy] : Designated healthcare proxy [Name: ___] : Health Care Proxy's Name: [unfilled]  [Relationship: ___] : Relationship: [unfilled] [Yes] : Yes [4 or more  times a week (4 pts)] : 4 or more  times a week (4 points) [3 or 4 (1 pt)] : 3 or 4  (1 point) [Never (0 pts)] : Never (0 points) [No] : In the past 12 months have you used drugs other than those required for medical reasons? No [de-identified] : fall in january and april - seeing orthopedics.  knocked over by dog [de-identified] : smoking less than 1/2 ppd, SMOKED 36 YEARS ABOUT 1 PPD ON AVERAGE [Audit-CScore] : 5 [de-identified] : WALKING, LIMITED DUE TO HIP BURSITIS [de-identified] : "GOOD" WEIGHT WATCHERS [de-identified] : SEE ABOVE [LSQ4Lousp] : 0 [Change in mental status noted] : No change in mental status noted [Language] : denies difficulty with language [Learning/Retaining New Information] : denies difficulty learning/retaining new information [Handling Complex Tasks] : denies difficulty handling complex tasks [Reports changes in hearing] : Reports no changes in hearing [Reports changes in vision] : Reports no changes in vision [Reports changes in dental health] : Reports no changes in dental health [MammogramDate] : 03/22 [MammogramComments] : SAW BREAST SURGERY WHO IS MONITORING [PapSmearDate] : 12/20 [BoneDensityDate] : 03/22 [ColonoscopyDate] : 01/17 [de-identified] : GLASSES FOR DISTANCE AND READING [AdvancecareDate] : 07/22

## 2022-07-12 ENCOUNTER — APPOINTMENT (OUTPATIENT)
Dept: DERMATOLOGY | Facility: CLINIC | Age: 67
End: 2022-07-12

## 2022-07-12 PROCEDURE — 99214 OFFICE O/P EST MOD 30 MIN: CPT

## 2022-07-19 LAB — HEMOCCULT STL QL IA: NEGATIVE

## 2022-08-01 ENCOUNTER — APPOINTMENT (OUTPATIENT)
Dept: PHYSICAL MEDICINE AND REHAB | Facility: CLINIC | Age: 67
End: 2022-08-01

## 2022-08-01 DIAGNOSIS — S52.532A COLLES' FRACTURE OF LEFT RADIUS, INITIAL ENCOUNTER FOR CLOSED FRACTURE: ICD-10-CM

## 2022-08-01 DIAGNOSIS — G56.02 CARPAL TUNNEL SYNDROME, LEFT UPPER LIMB: ICD-10-CM

## 2022-08-01 PROCEDURE — 99214 OFFICE O/P EST MOD 30 MIN: CPT

## 2022-08-01 NOTE — HISTORY OF PRESENT ILLNESS
[FreeTextEntry1] : 67 y.o. 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  Pt. reports approximately 40% symptomatic improvement post procedure.  Numbness in palm and finger tips has lessened.  Pt. able to golf more comfortably.

## 2022-08-01 NOTE — DATA REVIEWED
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

## 2022-08-01 NOTE — ASSESSMENT
[FreeTextEntry1] : 67 y.o. 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  I spent most of today's office visit (25 min) re-reviewing the patient's EMG, MSK US examination, discussing pathogenesis and further non-operative vs. operative management.  Although the patient has experiences partial symptomatic relief, she may need formal decompression given her EDX data and w/o significant nerve swelling on US.  Advised to c/w wrist splint with activities and at night.  Pt. is in agreement with plan.  All questions answered.  RTC prn.\par

## 2022-08-01 NOTE — PHYSICAL EXAM
[FreeTextEntry1] : NAD\par A&Ox3\par Non obese\par No significant change in today's office visit\par Left Wrist Deformity 2' old TOM fx\par Thenar eminence atrophy\par Phalen's +\par Tinel's +\par MMT 4/5 L APB\par SILT.  Decr PP MN distribution

## 2022-08-04 ENCOUNTER — APPOINTMENT (OUTPATIENT)
Dept: GASTROENTEROLOGY | Facility: CLINIC | Age: 67
End: 2022-08-04

## 2022-08-04 VITALS
OXYGEN SATURATION: 98 % | TEMPERATURE: 98 F | BODY MASS INDEX: 24.19 KG/M2 | WEIGHT: 120 LBS | SYSTOLIC BLOOD PRESSURE: 120 MMHG | RESPIRATION RATE: 16 BRPM | DIASTOLIC BLOOD PRESSURE: 70 MMHG | HEART RATE: 80 BPM | HEIGHT: 59 IN

## 2022-08-04 PROCEDURE — 99214 OFFICE O/P EST MOD 30 MIN: CPT

## 2022-08-04 NOTE — HISTORY OF PRESENT ILLNESS
[de-identified] : Patient with history of lymphocytic colitis.  She was last seen here a year ago.  She is only taking budesonide 3 mg/day.  Her usual habit is that she wakes up in the morning and has a formed bowel movement then softer and then loose every single morning.  She has no abdominal pain or bleeding occasionally she is okay.  She has had no nausea vomiting weight loss.

## 2022-08-04 NOTE — ASSESSMENT
[FreeTextEntry1] : I discussed with the patient that she still having symptoms and really should be on full dose of budesonide which is 9 mg/day.  We discussed the fact that  the 3 mg dose was supposed to be a tapering dose and not a therapeutic dose..  I recommended to her she go to 9 mg/day as there would potentially a significant benefit with no increased risk.  She will call me in a few weeks to let me know how she is doing on that regimen.

## 2022-08-20 ENCOUNTER — NON-APPOINTMENT (OUTPATIENT)
Age: 67
End: 2022-08-20

## 2022-09-04 LAB — SARS-COV-2 N GENE NPH QL NAA+PROBE: NOT DETECTED

## 2022-09-06 ENCOUNTER — TRANSCRIPTION ENCOUNTER (OUTPATIENT)
Age: 67
End: 2022-09-06

## 2022-09-07 ENCOUNTER — RESULT REVIEW (OUTPATIENT)
Age: 67
End: 2022-09-07

## 2022-09-07 ENCOUNTER — APPOINTMENT (OUTPATIENT)
Dept: GASTROENTEROLOGY | Facility: GI CENTER | Age: 67
End: 2022-09-07

## 2022-09-07 ENCOUNTER — OUTPATIENT (OUTPATIENT)
Dept: OUTPATIENT SERVICES | Facility: HOSPITAL | Age: 67
LOS: 1 days | End: 2022-09-07
Payer: MEDICARE

## 2022-09-07 DIAGNOSIS — Z83.71 FAMILY HISTORY OF COLONIC POLYPS: ICD-10-CM

## 2022-09-07 DIAGNOSIS — Z98.891 HISTORY OF UTERINE SCAR FROM PREVIOUS SURGERY: Chronic | ICD-10-CM

## 2022-09-07 DIAGNOSIS — Z98.890 OTHER SPECIFIED POSTPROCEDURAL STATES: Chronic | ICD-10-CM

## 2022-09-07 DIAGNOSIS — K52.832 LYMPHOCYTIC COLITIS: ICD-10-CM

## 2022-09-07 DIAGNOSIS — K37 UNSPECIFIED APPENDICITIS: Chronic | ICD-10-CM

## 2022-09-07 PROCEDURE — 45380 COLONOSCOPY AND BIOPSY: CPT | Mod: PT

## 2022-09-07 PROCEDURE — 88305 TISSUE EXAM BY PATHOLOGIST: CPT | Mod: 26

## 2022-09-07 PROCEDURE — 88305 TISSUE EXAM BY PATHOLOGIST: CPT

## 2022-09-07 PROCEDURE — 45380 COLONOSCOPY AND BIOPSY: CPT

## 2022-09-07 NOTE — REASON FOR VISIT
[Procedure: _________] : a [unfilled] procedure visit [FreeTextEntry1] : Lymphocytic colitis, family history colon polyps

## 2022-09-12 LAB — SURGICAL PATHOLOGY STUDY: SIGNIFICANT CHANGE UP

## 2022-10-10 ENCOUNTER — NON-APPOINTMENT (OUTPATIENT)
Age: 67
End: 2022-10-10

## 2022-10-10 VITALS — BODY MASS INDEX: 24.19 KG/M2 | HEIGHT: 59 IN | WEIGHT: 120 LBS

## 2022-10-10 NOTE — HISTORY OF PRESENT ILLNESS
[Current] : Current [TextBox_13] : Referred by Dr. Shahana Dillon.\par \par Ms. BEARD is a 67 year old female with a history of skin cancer.  Reviewed and confirmed that the patient meets screening eligibility criteria:\par \par 67 years old \par \par Smoking Status: Current Smoker\par \par Number of pack(s) per day: 1\par Number of years smoked: 37\par Number of pack years smokin\par \par No symptoms of lung cancer, including new cough, change in cough, hemoptysis, and unintentional weight loss.\par \par No personal history of lung cancer.  History of lung cancer in a first degree relative, her father. No history of lung disease or occupational exposures. [PacksperDay] : 1 [N_Years] : 37 [PacksperYear] : 37 Full range of motion of upper and lower extremities, no joint tenderness/swelling.

## 2022-10-12 ENCOUNTER — APPOINTMENT (OUTPATIENT)
Dept: CT IMAGING | Facility: CLINIC | Age: 67
End: 2022-10-12

## 2022-10-12 ENCOUNTER — OUTPATIENT (OUTPATIENT)
Dept: OUTPATIENT SERVICES | Facility: HOSPITAL | Age: 67
LOS: 1 days | End: 2022-10-12
Payer: MEDICARE

## 2022-10-12 DIAGNOSIS — Z98.890 OTHER SPECIFIED POSTPROCEDURAL STATES: Chronic | ICD-10-CM

## 2022-10-12 DIAGNOSIS — K37 UNSPECIFIED APPENDICITIS: Chronic | ICD-10-CM

## 2022-10-12 DIAGNOSIS — R91.1 SOLITARY PULMONARY NODULE: ICD-10-CM

## 2022-10-12 DIAGNOSIS — Z98.891 HISTORY OF UTERINE SCAR FROM PREVIOUS SURGERY: Chronic | ICD-10-CM

## 2022-10-12 PROCEDURE — 71271 CT THORAX LUNG CANCER SCR C-: CPT | Mod: 26

## 2022-10-12 PROCEDURE — 71271 CT THORAX LUNG CANCER SCR C-: CPT

## 2022-10-16 ENCOUNTER — NON-APPOINTMENT (OUTPATIENT)
Age: 67
End: 2022-10-16

## 2022-10-31 ENCOUNTER — APPOINTMENT (OUTPATIENT)
Dept: DERMATOLOGY | Facility: CLINIC | Age: 67
End: 2022-10-31

## 2022-10-31 PROCEDURE — 99213 OFFICE O/P EST LOW 20 MIN: CPT

## 2022-11-17 ENCOUNTER — RX RENEWAL (OUTPATIENT)
Age: 67
End: 2022-11-17

## 2023-01-13 ENCOUNTER — TRANSCRIPTION ENCOUNTER (OUTPATIENT)
Age: 68
End: 2023-01-13

## 2023-01-18 ENCOUNTER — NON-APPOINTMENT (OUTPATIENT)
Age: 68
End: 2023-01-18

## 2023-03-31 ENCOUNTER — NON-APPOINTMENT (OUTPATIENT)
Age: 68
End: 2023-03-31

## 2023-04-10 ENCOUNTER — NON-APPOINTMENT (OUTPATIENT)
Age: 68
End: 2023-04-10

## 2023-04-11 ENCOUNTER — EMERGENCY (EMERGENCY)
Facility: HOSPITAL | Age: 68
LOS: 1 days | Discharge: HOME | End: 2023-04-11
Attending: EMERGENCY MEDICINE
Payer: MEDICARE

## 2023-04-11 VITALS
OXYGEN SATURATION: 98 % | SYSTOLIC BLOOD PRESSURE: 157 MMHG | HEART RATE: 72 BPM | HEIGHT: 59 IN | DIASTOLIC BLOOD PRESSURE: 83 MMHG | RESPIRATION RATE: 20 BRPM | TEMPERATURE: 98 F | WEIGHT: 123.46 LBS

## 2023-04-11 DIAGNOSIS — Z98.891 HISTORY OF UTERINE SCAR FROM PREVIOUS SURGERY: Chronic | ICD-10-CM

## 2023-04-11 DIAGNOSIS — K37 UNSPECIFIED APPENDICITIS: Chronic | ICD-10-CM

## 2023-04-11 DIAGNOSIS — Z98.890 OTHER SPECIFIED POSTPROCEDURAL STATES: Chronic | ICD-10-CM

## 2023-04-11 PROCEDURE — 93971 EXTREMITY STUDY: CPT | Mod: 26,LT

## 2023-04-11 PROCEDURE — 99284 EMERGENCY DEPT VISIT MOD MDM: CPT | Mod: FS

## 2023-04-11 PROCEDURE — 99284 EMERGENCY DEPT VISIT MOD MDM: CPT | Mod: 25

## 2023-04-11 PROCEDURE — 93971 EXTREMITY STUDY: CPT

## 2023-04-11 NOTE — ED STATDOCS - NSFOLLOWUPINSTRUCTIONS_ED_ALL_ED_FT
Please take motrin 600mg every 6 hours with food as needed for pain  Follow up with your PCP within 1 week  Follow up with ortho clinic within 1 week  Return to the emergency room if you are experiencing any new or worsening symptoms      SEEK IMMEDIATE MEDICAL CARE IF YOU HAVE ANY OF THE FOLLOWING SYMPTOMS: worsening pain, inability to move that body part, numbness or tingling.

## 2023-04-11 NOTE — ED ADULT TRIAGE NOTE - CHIEF COMPLAINT QUOTE
Pt arrives to ED c/o L calf pain for 10 dyas. Pt was treated for cellulitis with antibiotics but still have pain. Was sent here form Urgent care to r/o DVT

## 2023-04-11 NOTE — ED STATDOCS - MUSCULOSKELETAL, MLM
Full rom left lower extremity, no calf tenderness, no warmth, no swelling Full rom left lower extremity, no calf tenderness, no warmth, no swelling. NVI

## 2023-04-11 NOTE — ED STATDOCS - CARE PROVIDER_API CALL
Rehan Cuellar (DO)  Orthopedics  [address] [place] [id number]  Phone: [phone]  Fax: [phone]  Follow Up Time: 1-3 Days

## 2023-04-11 NOTE — ED STATDOCS - CLINICAL SUMMARY MEDICAL DECISION MAKING FREE TEXT BOX
pt p/w posterior knee and calf pain, no redness, no swelling, no signs of infection, will check duplex lower extremity, reassess

## 2023-04-11 NOTE — ED ADULT NURSE NOTE - NSIMPLEMENTINTERV_GEN_ALL_ED
Implemented All Universal Safety Interventions:  [place] to call system. Call bell, personal items and telephone within reach. Instruct patient to call for assistance. Room bathroom lighting operational. Non-slip footwear when patient is off stretcher. Physically safe environment: no spills, clutter or unnecessary equipment. Stretcher in lowest position, wheels locked, appropriate side rails in place.

## 2023-04-11 NOTE — ED STATDOCS - OBJECTIVE STATEMENT
66 y/o female with PMHx of colitis, presents to the ED c/o left knee, posterior knee and left calf pain. Pt was seen at urgent care last week, was placed on doxy for cellulitis which she finished, pt still has continued pain behind knee and in calf. Pain exacerbated by walking, improves with rest. Pt denies fevers, chills. 68 y/o female with PMHx of colitis, presents to the ED c/o left knee, posterior knee and left calf pain. Pt was seen at urgent care last week, was placed on doxy for cellulitis which she finished, pt still has continued pain behind knee and in calf. Pain exacerbated by walking, improves with rest. Pt denies fevers, chills. Pt able to range knee. No trauma. No weakness or numbnes sin leg. no change in color.

## 2023-04-11 NOTE — ED ADULT TRIAGE NOTE - GLASGOW COMA SCALE: BEST VERBAL RESPONSE, MLM
(V5) oriented
PAST MEDICAL HISTORY:  Afib s/p ablation 2001    Anxiety     Cellulitis chronic    COPD (chronic obstructive pulmonary disease)     Goiter no meds    HTN (hypertension)     OA (osteoarthritis)     Obesity     PVD (peripheral vascular disease)

## 2023-04-11 NOTE — ED STATDOCS - PROGRESS NOTE DETAILS
US venous LLE: no DVT  RICE instructions given and instructed to f/u with ortho clinic in 1 week.  Rx motrin prn pain  Strict ED return precautions given if any new or worsening symptoms

## 2023-04-11 NOTE — ED STATDOCS - PATIENT PORTAL LINK FT
You can access the FollowMyHealth Patient Portal offered by [facility] by registering at the following website: [email or web address]. By joining [facility]’s FollowMyHealth portal, you will also be able to view your health information using other applications (apps) compatible with our system.

## 2023-04-18 ENCOUNTER — APPOINTMENT (OUTPATIENT)
Dept: ORTHOPEDIC SURGERY | Facility: CLINIC | Age: 68
End: 2023-04-18
Payer: MEDICARE

## 2023-04-18 VITALS — BODY MASS INDEX: 24.6 KG/M2 | WEIGHT: 122 LBS | HEIGHT: 59 IN

## 2023-04-18 DIAGNOSIS — M25.462 EFFUSION, LEFT KNEE: ICD-10-CM

## 2023-04-18 PROCEDURE — 73564 X-RAY EXAM KNEE 4 OR MORE: CPT | Mod: LT

## 2023-04-18 PROCEDURE — 99203 OFFICE O/P NEW LOW 30 MIN: CPT | Mod: 25

## 2023-04-18 PROCEDURE — 20611 DRAIN/INJ JOINT/BURSA W/US: CPT | Mod: LT

## 2023-04-18 NOTE — PROCEDURE
[Large Joint Injection] : Large joint injection [Left] : of the left [Knee] : knee [Pain] : pain [Inflammation] : inflammation [X-ray evidence of Osteoarthritis on this or prior visit] : x-ray evidence of Osteoarthritis on this or prior visit [Alcohol] : alcohol [Betadine] : betadine [Ethyl Chloride sprayed topically] : ethyl chloride sprayed topically [Sterile technique used] : sterile technique used [___ cc    3mg] :  Betamethasone (Celestone) ~Vcc of 3mg [___ cc    1%] : Lidocaine ~Vcc of 1%  [___ cc    0.5%] : Bupivacaine (Marcaine) ~Vcc of 0.5%  [] : Patient tolerated procedure well [Call if redness, pain or fever occur] : call if redness, pain or fever occur [Apply ice for 15min out of every hour for the next 12-24 hours as tolerated] : apply ice for 15 minutes out of every hour for the next 12-24 hours as tolerated [Previous OTC use and PT nontherapeutic] : patient has tried OTC's including aspirin, Ibuprofen, Aleve, etc or prescription NSAIDS, and/or exercises at home and/or physical therapy without satisfactory response [Patient had decreased mobility in the joint] : patient had decreased mobility in the joint [Risks, benefits, alternatives discussed / Verbal consent obtained] : the risks benefits, and alternatives have been discussed, and verbal consent was obtained [Prior failure or difficult injection] : prior failure or difficult injection [Altered anatomic landmarks d/t erosive arthritis] : altered anatomic landmarks d/t erosive arthritis [All ultrasound images have been permanently captured and stored accordingly in our picture archiving and communication system] : All ultrasound images have been permanently captured and stored accordingly in our picture archiving and communication system [Effusion] : effusion [de-identified] : seroud fluid [de-identified] : 10cc

## 2023-04-18 NOTE — DISCUSSION/SUMMARY
[de-identified] : General Dx Discussion\par The patient was advised of the diagnosis. The natural history of the pathology was explained in full to the patient in layman's terms. All questions were answered. The risks and benefits of surgical and non-surgical treatment alternatives were explained in full to the patient.\par \par Case Discussed.\par Left knee aspiration/ CSI today and tolerated well. \par Discussed the possibility of visco injections if symptoms persist. \par \par \par Entered by Candelaria GODWIN acting as a scribe.\par Instructions: Dr. Smith- The documentation recorded by the scribe accurately reflects the service I personally performed and the decisions made by me.\par

## 2023-04-18 NOTE — HISTORY OF PRESENT ILLNESS
[8] : 8 [Localized] : localized [Leisure] : leisure [Social interactions] : social interactions [Rest] : rest [Ice] : ice [Walking] : walking [Retired] : Work status: retired [de-identified] : Patient c/o left knee pain since March after doing water aerobics classes. She was seen in UC and was treated with antibiotics for cellulitis. she states the cellulitis cleared up but she continues to have pain to her left knee. No catching or locking. Pain is constant and is aggravated with walking. She has been taking motrin with some relief.  [] : no [FreeTextEntry1] : left knee [FreeTextEntry5] : left knee pain and swelling since doing water aerobic classes end of March 2023 . was told cellulitis and was given 10 day of antibiotics [FreeTextEntry6] : discomfort [FreeTextEntry9] : antibiotics and anti inflammatories [de-identified] : bending [de-identified] : 4-1-23 [de-identified] : [facility] urgent care and er [de-identified] : sono at

## 2023-04-18 NOTE — IMAGING
[Left] : left knee [All Views] : anteroposterior, lateral, skyline, and anteroposterior standing [Degenerative change] : Degenerative change [FreeTextEntry9] : mild to moderate oa with medial joint space narrowing

## 2023-04-27 ENCOUNTER — APPOINTMENT (OUTPATIENT)
Dept: ORTHOPEDIC SURGERY | Facility: CLINIC | Age: 68
End: 2023-04-27

## 2023-05-01 ENCOUNTER — APPOINTMENT (OUTPATIENT)
Dept: DERMATOLOGY | Facility: CLINIC | Age: 68
End: 2023-05-01
Payer: MEDICARE

## 2023-05-01 ENCOUNTER — NON-APPOINTMENT (OUTPATIENT)
Age: 68
End: 2023-05-01

## 2023-05-01 PROCEDURE — 17003 DESTRUCT PREMALG LES 2-14: CPT

## 2023-05-01 PROCEDURE — 99213 OFFICE O/P EST LOW 20 MIN: CPT | Mod: 25

## 2023-05-01 PROCEDURE — 17000 DESTRUCT PREMALG LESION: CPT

## 2023-05-04 ENCOUNTER — OUTPATIENT (OUTPATIENT)
Dept: OUTPATIENT SERVICES | Facility: HOSPITAL | Age: 68
LOS: 1 days | End: 2023-05-04
Payer: MEDICARE

## 2023-05-04 ENCOUNTER — APPOINTMENT (OUTPATIENT)
Dept: ULTRASOUND IMAGING | Facility: CLINIC | Age: 68
End: 2023-05-04
Payer: MEDICARE

## 2023-05-04 DIAGNOSIS — Z98.891 HISTORY OF UTERINE SCAR FROM PREVIOUS SURGERY: Chronic | ICD-10-CM

## 2023-05-04 DIAGNOSIS — Z12.31 ENCOUNTER FOR SCREENING MAMMOGRAM FOR MALIGNANT NEOPLASM OF BREAST: ICD-10-CM

## 2023-05-04 DIAGNOSIS — Z98.890 OTHER SPECIFIED POSTPROCEDURAL STATES: Chronic | ICD-10-CM

## 2023-05-04 DIAGNOSIS — K37 UNSPECIFIED APPENDICITIS: Chronic | ICD-10-CM

## 2023-05-04 PROCEDURE — 77067 SCR MAMMO BI INCL CAD: CPT | Mod: 26

## 2023-05-04 PROCEDURE — 76642 ULTRASOUND BREAST LIMITED: CPT

## 2023-05-04 PROCEDURE — 77067 SCR MAMMO BI INCL CAD: CPT

## 2023-05-04 PROCEDURE — 77063 BREAST TOMOSYNTHESIS BI: CPT

## 2023-05-04 PROCEDURE — 76642 ULTRASOUND BREAST LIMITED: CPT | Mod: 26,LT

## 2023-05-04 PROCEDURE — 77063 BREAST TOMOSYNTHESIS BI: CPT | Mod: 26

## 2023-05-08 ENCOUNTER — FORM ENCOUNTER (OUTPATIENT)
Age: 68
End: 2023-05-08

## 2023-05-09 ENCOUNTER — APPOINTMENT (OUTPATIENT)
Dept: ORTHOPEDIC SURGERY | Facility: CLINIC | Age: 68
End: 2023-05-09
Payer: MEDICARE

## 2023-05-09 ENCOUNTER — APPOINTMENT (OUTPATIENT)
Dept: MRI IMAGING | Facility: CLINIC | Age: 68
End: 2023-05-09
Payer: MEDICARE

## 2023-05-09 VITALS — BODY MASS INDEX: 24.6 KG/M2 | HEIGHT: 59 IN | WEIGHT: 122 LBS

## 2023-05-09 DIAGNOSIS — S42.295A OTHER NONDISPLACED FRACTURE OF UPPER END OF LEFT HUMERUS, INITIAL ENCOUNTER FOR CLOSED FRACTURE: ICD-10-CM

## 2023-05-09 PROCEDURE — 20611 DRAIN/INJ JOINT/BURSA W/US: CPT

## 2023-05-09 PROCEDURE — J3490M: CUSTOM | Mod: NC

## 2023-05-09 PROCEDURE — 73030 X-RAY EXAM OF SHOULDER: CPT | Mod: LT

## 2023-05-09 PROCEDURE — 73221 MRI JOINT UPR EXTREM W/O DYE: CPT | Mod: LT,MH

## 2023-05-09 PROCEDURE — 99214 OFFICE O/P EST MOD 30 MIN: CPT | Mod: 25

## 2023-05-09 NOTE — DATA REVIEWED
[MRI] : MRI [Left] : left [Shoulder] : shoulder [Report was reviewed and noted in the chart] : The report was reviewed and noted in the chart [FreeTextEntry1] : [facility] 12/5/22   posttraumatic deformity with an impacted fracture of the proximal humerus, creating essential full thickness discontinuity of the supraspinatous tendon with minimal atrophy and extending to the articular side of the infraspinatous. Biceps is degenerated proximally but not retracted. There are foci of full thickness cartilage loss over the glenohumeral joint.

## 2023-05-09 NOTE — PROCEDURE
[Large Joint Injection] : Large joint injection [Left] : of the left [Subacromial Space] : subacromial space [Pain] : pain [Inflammation] : inflammation [Alcohol] : alcohol [Betadine] : betadine [Ethyl Chloride sprayed topically] : ethyl chloride sprayed topically [Sterile technique used] : sterile technique used [___ cc    3mg] :  Betamethasone (Celestone) ~Vcc of 3mg [___ cc    1%] : Lidocaine ~Vcc of 1%  [___ cc    0.5%] : Bupivacaine (Marcaine) ~Vcc of 0.5%  [] : Patient tolerated procedure well [Call if redness, pain or fever occur] : call if redness, pain or fever occur [Apply ice for 15min out of every hour for the next 12-24 hours as tolerated] : apply ice for 15 minutes out of every hour for the next 12-24 hours as tolerated [Previous OTC use and PT nontherapeutic] : patient has tried OTC's including aspirin, Ibuprofen, Aleve, etc or prescription NSAIDS, and/or exercises at home and/or physical therapy without satisfactory response [Patient had decreased mobility in the joint] : patient had decreased mobility in the joint [Risks, benefits, alternatives discussed / Verbal consent obtained] : the risks benefits, and alternatives have been discussed, and verbal consent was obtained [Prior failure or difficult injection] : prior failure or difficult injection [All ultrasound images have been permanently captured and stored accordingly in our picture archiving and communication system] : All ultrasound images have been permanently captured and stored accordingly in our picture archiving and communication system

## 2023-05-09 NOTE — HISTORY OF PRESENT ILLNESS
[Sudden] : sudden [8] : 8 [1] : 2 [Dull/Aching] : dull/aching [Radiating] : radiating [Shooting] : shooting [Occasional] : occasional [Leisure] : leisure [Social interactions] : social interactions [Retired] : Work status: retired [de-identified] : Patient here for further evaluation of left shoulder pain. She states she fell last november and injured her left shoulder. She states she was treated in a sling followed by HE[facility]. She has never felt 100% better. She still has pain with certain movements. She is here for another opinion. She brings in an mri from last year for review.  [] : no [FreeTextEntry1] : l shoulder [FreeTextEntry5] : pt fell in NOV 2022 playing pickleball and went to [facility] for a rt shoulder issue so  left shoulder xr/MRI were completed [FreeTextEntry6] : limited motion with difficulty  [FreeTextEntry7] : neck  [FreeTextEntry9] : Aleve and ES Tylenol [de-identified] : some PT exercises , raising arm [de-identified] : [facility] [de-identified] : images at [facility]

## 2023-05-09 NOTE — PHYSICAL EXAM
[Left] : left shoulder [Sitting] : sitting [Mild] : mild [] : no deformity [TWNoteComboBox7] : active forward flexion 160 degrees [de-identified] : active abduction 100 degrees [TWNoteComboBox6] : internal rotation L3

## 2023-05-09 NOTE — DISCUSSION/SUMMARY
[de-identified] : General Dx Discussion\par The patient was advised of the diagnosis. The natural history of the pathology was explained in full to the patient in layman's terms. All questions were answered. The risks and benefits of surgical and non-surgical treatment alternatives were explained in full to the patient.\par \par Case Discussed.\par CSI today left shoulder and tolerated well. \par Will get updated mri for further evaluation of RCT.\par f/u after mri.\par \par Entered by Candelaria GODWIN acting as a scribe.\par Instructions: Dr. Smith- The documentation recorded by the scribe accurately reflects the service I personally performed and the decisions made by me.\par

## 2023-05-16 ENCOUNTER — APPOINTMENT (OUTPATIENT)
Dept: ORTHOPEDIC SURGERY | Facility: CLINIC | Age: 68
End: 2023-05-16
Payer: MEDICARE

## 2023-05-16 VITALS — HEIGHT: 59 IN | WEIGHT: 122 LBS | BODY MASS INDEX: 24.6 KG/M2

## 2023-05-16 PROCEDURE — 99213 OFFICE O/P EST LOW 20 MIN: CPT

## 2023-05-17 NOTE — DATA REVIEWED
[MRI] : MRI [Left] : left [Shoulder] : shoulder [Report was reviewed and noted in the chart] : The report was reviewed and noted in the chart [I reviewed the films/CD] : I reviewed the films/CD [FreeTextEntry1] : 1. Abnormality in the greater tuberosity and humeral head laterally where there is scalloping over an area \par measuring 3 cm with reactive marrow edema, contour deformity, and surrounding soft tissue swelling and \par bursitis. The findings could represent an impacted greater tuberosity fracture; however, underlying bone lesion \par cannot be excluded. Correlation with plain film is recommended and consider CT scan of the left shoulder to \par further evaluate. \par 2. High-grade partial tearing at the infraspinatus tendon insertion, moderate partial tearing at the supraspinatus \par tendon insertion, mild partial tearing at the subscapularis tendon insertion, biceps tenosynovitis, and superior \par labral tearing with large effusion, moderate bursitis, AC joint arthrosis and lateral acromial bone spurs.\par 3. No marrow edema in the glenoid.\par 4. No disproportionate rotator cuff muscle atrophy.\par 5. Clinical correlation regarding acute trauma and correlation with plain film is recommended.\par 6. Consider repeat MRI to further evaluate for healing as clinically indicated.

## 2023-05-17 NOTE — HISTORY OF PRESENT ILLNESS
[8] : 8 [0] : 0 [Radiating] : radiating [Stabbing] : stabbing [Intermittent] : intermittent [Rest] : rest [Retired] : Work status: retired [] : no [FreeTextEntry1] : l shoulder [FreeTextEntry7] : arm [FreeTextEntry9] : OTC [de-identified] : using it for anything heavy  [de-identified] : MRI [facility]

## 2023-05-17 NOTE — PHYSICAL EXAM
[Left] : left shoulder [Sitting] : sitting [Mild] : mild [] : tenderness at lateral shoulder [5 ___] : forward flexion 5[unfilled]/5 [5___] : internal rotation 5[unfilled]/5 [TWNoteComboBox7] : active forward flexion 140 degrees [de-identified] : active abduction 100 degrees [TWNoteComboBox6] : internal rotation L3

## 2023-05-17 NOTE — DISCUSSION/SUMMARY
[de-identified] : General Dx Discussion\par The patient was advised of the diagnosis. The natural history of the pathology was explained in full to the patient in layman's terms. All questions were answered. The risks and benefits of surgical and non-surgical treatment alternatives were explained in full to the patient.\par \par \par will get PT\par mri reviewed \par will get a CD of her ols mri to compare \par poss of further work up and / or surgery discussed \par questions answered

## 2023-05-30 ENCOUNTER — APPOINTMENT (OUTPATIENT)
Dept: ORTHOPEDIC SURGERY | Facility: CLINIC | Age: 68
End: 2023-05-30
Payer: MEDICARE

## 2023-05-30 VITALS — BODY MASS INDEX: 24.6 KG/M2 | WEIGHT: 122 LBS | HEIGHT: 59 IN

## 2023-05-30 DIAGNOSIS — M75.81 OTHER SHOULDER LESIONS, RIGHT SHOULDER: ICD-10-CM

## 2023-05-30 PROCEDURE — 99214 OFFICE O/P EST MOD 30 MIN: CPT | Mod: 25

## 2023-05-30 PROCEDURE — 20611 DRAIN/INJ JOINT/BURSA W/US: CPT | Mod: LT

## 2023-05-30 NOTE — HISTORY OF PRESENT ILLNESS
[8] : 8 [Localized] : localized [Stabbing] : stabbing [Leisure] : leisure [Social interactions] : social interactions [Ice] : ice [] : no [FreeTextEntry1] : left knee [FreeTextEntry6] : behind the knee [FreeTextEntry9] : Aleve or FELIX Tylenol [de-identified] : sitting too long

## 2023-05-30 NOTE — PHYSICAL EXAM
[Sitting] : sitting [Mild] : mild [5 ___] : forward flexion 5[unfilled]/5 [Left] : left knee [NL (0)] : extension 0 degrees [5___] : hamstring 5[unfilled]/5 [] : no deformity [de-identified] : active abduction 100 degrees [TWNoteComboBox6] : internal rotation L3 [TWNoteComboBox7] : flexion 110 degrees

## 2023-05-30 NOTE — PROCEDURE
[Large Joint Injection] : Large joint injection [Left] : of the left [Knee] : knee [Pain] : pain [Inflammation] : inflammation [X-ray evidence of Osteoarthritis on this or prior visit] : x-ray evidence of Osteoarthritis on this or prior visit [Alcohol] : alcohol [Betadine] : betadine [Ethyl Chloride sprayed topically] : ethyl chloride sprayed topically [Sterile technique used] : sterile technique used [Euflexxa(20mg)] : 20mg of Euflexxa [#1] : series #1 [] : Patient tolerated procedure well [Call if redness, pain or fever occur] : call if redness, pain or fever occur [Apply ice for 15min out of every hour for the next 12-24 hours as tolerated] : apply ice for 15 minutes out of every hour for the next 12-24 hours as tolerated [Previous OTC use and PT nontherapeutic] : patient has tried OTC's including aspirin, Ibuprofen, Aleve, etc or prescription NSAIDS, and/or exercises at home and/or physical therapy without satisfactory response [Patient had decreased mobility in the joint] : patient had decreased mobility in the joint [Risks, benefits, alternatives discussed / Verbal consent obtained] : the risks benefits, and alternatives have been discussed, and verbal consent was obtained [Prior failure or difficult injection] : prior failure or difficult injection [All ultrasound images have been permanently captured and stored accordingly in our picture archiving and communication system] : All ultrasound images have been permanently captured and stored accordingly in our picture archiving and communication system [Visualization of the needle and placement of injection was performed without complication] : visualization of the needle and placement of injection was performed without complication

## 2023-06-01 ENCOUNTER — RESULT CHARGE (OUTPATIENT)
Age: 68
End: 2023-06-01

## 2023-06-01 ENCOUNTER — APPOINTMENT (OUTPATIENT)
Dept: ORTHOPEDIC SURGERY | Facility: CLINIC | Age: 68
End: 2023-06-01
Payer: MEDICARE

## 2023-06-01 VITALS — WEIGHT: 122 LBS | BODY MASS INDEX: 24.6 KG/M2 | HEIGHT: 59 IN

## 2023-06-01 DIAGNOSIS — M25.552 PAIN IN LEFT HIP: ICD-10-CM

## 2023-06-01 PROCEDURE — 73502 X-RAY EXAM HIP UNI 2-3 VIEWS: CPT

## 2023-06-01 PROCEDURE — 72100 X-RAY EXAM L-S SPINE 2/3 VWS: CPT

## 2023-06-01 PROCEDURE — 99214 OFFICE O/P EST MOD 30 MIN: CPT

## 2023-06-01 NOTE — PHYSICAL EXAM
[Left] : left hip [5___] : adduction 5[unfilled]/5 [FreeTextEntry8] : tenderness lower lumbar  [] : no tenderness [FreeTextEntry3] : palpable mass proximal 1/3 lateral thigh

## 2023-06-01 NOTE — HISTORY OF PRESENT ILLNESS
[Sudden] : sudden [9] : 9 [3] : 3 [Dull/Aching] : dull/aching [Throbbing] : throbbing [] : yes [de-identified] : 6/1/2023: 67 y/o female with c/o left groin pain for the past 4 years but worse of the past 6 months after a fall while playing pickle ball. Takes Aleve and Tylenol PRN. Has not done recent PT for the hip. Has history of severe lumbar stenosis and degenerative scoliosis.  [FreeTextEntry1] : Left hip  [FreeTextEntry3] : 11/2022  [FreeTextEntry5] : pt states she fell playing [employer] ball  [FreeTextEntry7] : hamstring  [FreeTextEntry9] : aleve  [de-identified] : activity  [de-identified] : Pain management  ([facility]) [de-identified] : injections in L spine Cortisone 2022

## 2023-06-01 NOTE — ASSESSMENT
[FreeTextEntry1] : 68F p/w L hip OA poss AVN\par \par f/u MRI L hip, r/o AVN\par Meloxicam for pain\par return after imaging\par \par The natural progression of Osteoarthritis was explained to the patient. We discussed the possible treatment options from conservative to operative. These included NSAIDS, Glucosamine and Chondrotin sulfate, and Physical Therapy. We also discussed that at some point they may progress to needed a CHICA. Information and pamphlets were given.\par

## 2023-06-04 ENCOUNTER — FORM ENCOUNTER (OUTPATIENT)
Age: 68
End: 2023-06-04

## 2023-06-05 ENCOUNTER — APPOINTMENT (OUTPATIENT)
Dept: MRI IMAGING | Facility: CLINIC | Age: 68
End: 2023-06-05
Payer: MEDICARE

## 2023-06-05 PROCEDURE — 73721 MRI JNT OF LWR EXTRE W/O DYE: CPT | Mod: LT,MH

## 2023-06-06 ENCOUNTER — APPOINTMENT (OUTPATIENT)
Dept: ORTHOPEDIC SURGERY | Facility: CLINIC | Age: 68
End: 2023-06-06

## 2023-06-06 ENCOUNTER — APPOINTMENT (OUTPATIENT)
Dept: ORTHOPEDIC SURGERY | Facility: CLINIC | Age: 68
End: 2023-06-06
Payer: MEDICARE

## 2023-06-06 VITALS — WEIGHT: 122 LBS | HEIGHT: 59 IN | BODY MASS INDEX: 24.6 KG/M2

## 2023-06-06 PROCEDURE — 20611 DRAIN/INJ JOINT/BURSA W/US: CPT | Mod: LT

## 2023-06-06 PROCEDURE — 99024 POSTOP FOLLOW-UP VISIT: CPT

## 2023-06-06 NOTE — PHYSICAL EXAM
[Sitting] : sitting [Mild] : mild [5 ___] : forward flexion 5[unfilled]/5 [Left] : left knee [NL (0)] : extension 0 degrees [5___] : hamstring 5[unfilled]/5 [] : no deformity [de-identified] : active abduction 100 degrees [TWNoteComboBox6] : internal rotation L3 [TWNoteComboBox7] : flexion 110 degrees

## 2023-06-06 NOTE — DISCUSSION/SUMMARY
[de-identified] : General Dx Discussion\par The patient was advised of the diagnosis. The natural history of the pathology was explained in full to the patient in layman's terms. All questions were answered. The risks and benefits of surgical and non-surgical treatment alternatives were explained in full to the patient.\par \par Case discussed\par Euflexxa lt knee tolerated well, post injection instructions.\par Questions answered.\par Return one week to complete series.\par \par Progress note completed by PÉREZ Olivarez, under the direct supervision of Pj Smith MD.

## 2023-06-06 NOTE — REVIEW OF SYSTEMS
[Joint Pain] : joint pain [Joint Stiffness] : joint stiffness [Negative] : Heme/Lymph [Muscle Weakness] : muscle weakness

## 2023-06-06 NOTE — HISTORY OF PRESENT ILLNESS
[Localized] : localized [Walking/activity] : walking/activity [Injection therapy] : injection therapy [2] : 2 [Euflexxa] : Euflexxa [8] : 8 [Stabbing] : stabbing [Leisure] : leisure [Social interactions] : social interactions [Ice] : ice [] : no [FreeTextEntry1] : left knee [FreeTextEntry6] : behind the knee [FreeTextEntry9] : Aleve or FELIX Tylenol [de-identified] : sitting too long [de-identified] : 5-30-23 [de-identified] : l knee

## 2023-06-08 ENCOUNTER — APPOINTMENT (OUTPATIENT)
Dept: ORTHOPEDIC SURGERY | Facility: CLINIC | Age: 68
End: 2023-06-08
Payer: MEDICARE

## 2023-06-08 VITALS — BODY MASS INDEX: 24.6 KG/M2 | WEIGHT: 122 LBS | HEIGHT: 59 IN

## 2023-06-08 PROCEDURE — 99214 OFFICE O/P EST MOD 30 MIN: CPT

## 2023-06-08 NOTE — HISTORY OF PRESENT ILLNESS
[9] : 9 [0] : 0 [Retired] : Work status: retired [de-identified] : 6/1/2023: 67 y/o female with c/o left groin pain for the past 4 years but worse of the past 6 months after a fall while playing pickle ball. Takes Aleve and Tylenol PRN. Has not done recent PT for the hip. Has history of severe lumbar stenosis and degenerative scoliosis.  [de-identified] : no treatment at this time

## 2023-06-08 NOTE — DATA REVIEWED
[FreeTextEntry1] : MRI L hip (OCOA) 6/5/23\par 1. Mild left hip arthrosis with labral tearing, chondral loss, mild bone spurs, and subchondral cysts with slight effusion and synovitis superolaterally without acute fracture, malalignment, or osteonecrosis.\par 2. Degenerative changes in the visualized lower lumbar spine which appears convex towards the left with degenerative endplate changes asymmetric towards the right in the lower lumbar spine.\par 3. Chronic appearing bilateral hamstring insertional tendinopathy and mild partial tearing at the right hamstring tendon insertion.\par 4. Mild bilateral distal gluteal tendinopathy with moderate bursitis on the left and mild bursitis on the right.\par 5. Symphysis pubis arthrosis.\par 6. Moderate disproportionate gluteus medius and minimus muscle atrophy bilaterally left greater than right.\par 7. No acute fracture involving the visualized bony pelvis.

## 2023-06-08 NOTE — ASSESSMENT
[FreeTextEntry1] : 68F p/w L hip OA, labrum tear\par \par Begin PT\par Meloxicam for pain\par return 6-8 weeks\par \par The natural progression of Osteoarthritis was explained to the patient. We discussed the possible treatment options from conservative to operative. These included NSAIDS, Glucosamine and Chondrotin sulfate, and Physical Therapy. We also discussed that at some point they may progress to needed a CHICA. Information and pamphlets were given.\par

## 2023-06-13 ENCOUNTER — APPOINTMENT (OUTPATIENT)
Dept: ORTHOPEDIC SURGERY | Facility: CLINIC | Age: 68
End: 2023-06-13
Payer: MEDICARE

## 2023-06-13 VITALS — HEIGHT: 59 IN | BODY MASS INDEX: 24.6 KG/M2 | WEIGHT: 122 LBS

## 2023-06-13 PROCEDURE — 99024 POSTOP FOLLOW-UP VISIT: CPT

## 2023-06-13 PROCEDURE — 20611 DRAIN/INJ JOINT/BURSA W/US: CPT | Mod: LT

## 2023-06-13 NOTE — DISCUSSION/SUMMARY
[de-identified] : General Dx Discussion\par The patient was advised of the diagnosis. The natural history of the pathology was explained in full to the patient in layman's terms. All questions were answered. The risks and benefits of surgical and non-surgical treatment alternatives were explained in full to the patient.\par \par Case discussed\par Euflexxa lt knee tolerated well, post injection instructions.\par Questions answered.\par Return in 6 weeks if symptoms persist..\par \par Progress note completed by PÉREZ Sandhu, under the direct supervision of Pj Smith MD.

## 2023-06-13 NOTE — HISTORY OF PRESENT ILLNESS
[3] : 3 [Euflexxa] : Euflexxa [5] : 5 [Dull/Aching] : dull/aching [Localized] : localized [Injection therapy] : injection therapy [de-identified] : Patient presents to continue Euflexxa injections. [] : no [FreeTextEntry1] : l knee [de-identified] : stiffness after not moving [de-identified] : 6-6-23 [de-identified] : l knee

## 2023-06-13 NOTE — PHYSICAL EXAM
[Left] : left knee [NL (0)] : extension 0 degrees [5___] : hamstring 5[unfilled]/5 [] : light touch is intact throughout [TWNoteComboBox7] : flexion 110 degrees

## 2023-06-27 NOTE — PHYSICAL EXAM
PROVIDER:[[id number]],FOLLOWUP:[1 month]] [Left] : left knee [NL (0)] : extension 0 degrees [5___] : hamstring 5[unfilled]/5 [] : patient ambulates without assistive device [TWNoteComboBox7] : flexion 110 degrees PROVIDER:[[id number]],SCHEDULEDAPPT:[07/27/2023],SCHEDULEDAPPTTIME:[03:30 PM]]

## 2023-07-06 ENCOUNTER — APPOINTMENT (OUTPATIENT)
Dept: FAMILY MEDICINE | Facility: CLINIC | Age: 68
End: 2023-07-06
Payer: MEDICARE

## 2023-07-06 ENCOUNTER — NON-APPOINTMENT (OUTPATIENT)
Age: 68
End: 2023-07-06

## 2023-07-06 VITALS
WEIGHT: 117 LBS | HEART RATE: 63 BPM | OXYGEN SATURATION: 97 % | HEIGHT: 59 IN | DIASTOLIC BLOOD PRESSURE: 92 MMHG | SYSTOLIC BLOOD PRESSURE: 154 MMHG | BODY MASS INDEX: 23.59 KG/M2

## 2023-07-06 VITALS — SYSTOLIC BLOOD PRESSURE: 152 MMHG | DIASTOLIC BLOOD PRESSURE: 90 MMHG

## 2023-07-06 DIAGNOSIS — Z00.00 ENCOUNTER FOR GENERAL ADULT MEDICAL EXAMINATION W/OUT ABNORMAL FINDINGS: ICD-10-CM

## 2023-07-06 DIAGNOSIS — Z72.0 TOBACCO USE: ICD-10-CM

## 2023-07-06 DIAGNOSIS — Z23 ENCOUNTER FOR IMMUNIZATION: ICD-10-CM

## 2023-07-06 PROCEDURE — G0439: CPT

## 2023-07-06 PROCEDURE — 99406 BEHAV CHNG SMOKING 3-10 MIN: CPT

## 2023-07-06 PROCEDURE — G0009: CPT

## 2023-07-06 PROCEDURE — 90677 PCV20 VACCINE IM: CPT

## 2023-07-06 PROCEDURE — 93000 ELECTROCARDIOGRAM COMPLETE: CPT

## 2023-07-06 PROCEDURE — G0296 VISIT TO DETERM LDCT ELIG: CPT

## 2023-07-06 RX ORDER — VITAMIN A 10000 UNIT
TABLET ORAL
Refills: 0 | Status: ACTIVE | COMMUNITY

## 2023-07-06 RX ORDER — RISEDRONATE SODIUM 35 MG/1
35 TABLET, FILM COATED ORAL
Qty: 12 | Refills: 3 | Status: DISCONTINUED | COMMUNITY
Start: 2020-06-09 | End: 2023-07-06

## 2023-07-06 RX ORDER — PYRIDOXINE HCL (VITAMIN B6) 100 MG
TABLET ORAL
Refills: 0 | Status: DISCONTINUED | COMMUNITY
End: 2023-07-06

## 2023-07-06 RX ORDER — SODIUM SULFATE, POTASSIUM SULFATE, MAGNESIUM SULFATE 17.5; 3.13; 1.6 G/ML; G/ML; G/ML
17.5-3.13-1.6 SOLUTION, CONCENTRATE ORAL
Qty: 1 | Refills: 0 | Status: DISCONTINUED | COMMUNITY
Start: 2022-08-04 | End: 2023-07-06

## 2023-07-06 RX ORDER — DENOSUMAB 60 MG/ML
INJECTION SUBCUTANEOUS
Refills: 0 | Status: ACTIVE | COMMUNITY

## 2023-07-06 RX ORDER — MELOXICAM 15 MG/1
15 TABLET ORAL DAILY
Qty: 14 | Refills: 0 | Status: DISCONTINUED | COMMUNITY
Start: 2022-05-04 | End: 2023-07-06

## 2023-07-09 PROBLEM — Z72.0 CURRENT TOBACCO USE: Status: ACTIVE | Noted: 2020-05-15

## 2023-07-09 PROBLEM — Z23 NEED FOR PNEUMOCOCCAL VACCINATION: Status: ACTIVE | Noted: 2023-07-06

## 2023-07-09 NOTE — PHYSICAL EXAM
[No Acute Distress] : no acute distress [Well-Appearing] : well-appearing [Normal Sclera/Conjunctiva] : normal sclera/conjunctiva [PERRL] : pupils equal round and reactive to light [Normal Outer Ear/Nose] : the outer ears and nose were normal in appearance [Normal Oropharynx] : the oropharynx was normal [No Lymphadenopathy] : no lymphadenopathy [Supple] : supple [No Respiratory Distress] : no respiratory distress  [No Accessory Muscle Use] : no accessory muscle use [Clear to Auscultation] : lungs were clear to auscultation bilaterally [Normal Rate] : normal rate  [Regular Rhythm] : with a regular rhythm [Normal S1, S2] : normal S1 and S2 [Soft] : abdomen soft [Non Tender] : non-tender [Normal Bowel Sounds] : normal bowel sounds [No Joint Swelling] : no joint swelling [Grossly Normal Strength/Tone] : grossly normal strength/tone [No Rash] : no rash [Coordination Grossly Intact] : coordination grossly intact [No Focal Deficits] : no focal deficits [Normal Gait] : normal gait [Deep Tendon Reflexes (DTR)] : deep tendon reflexes were 2+ and symmetric [Speech Grossly Normal] : speech grossly normal [Normal Affect] : the affect was normal [Normal Mood] : the mood was normal

## 2023-07-09 NOTE — HISTORY OF PRESENT ILLNESS
[FreeTextEntry1] : wellness exam [de-identified] : MS. BEARD IS A PLEASANT 69 YO PRESENTING FOR YEARLY WELLNESS EXAM.  +TOBACCO USE. PREVIOUSLY TRIED TO QUIT USING GUMS.  THINKING ABOUT QUITTING THE END OF THE SUMMER.  QUIT WITH PATCH A FEW TIMES.  THE LONGEST SHE QUIT WAS FOR ONE YEAR.  AWARE OF THE RISKS OF TOBACCO USE.  SEEING THERAPIST EVERY TWO WEEKS AND PSYCHIATRIST MONTHLY.  IS GETTING PROLIA FROM RHEUMATOLOGY.  CALCIUM + D STOPPED.  \par ENDOCRINOLOGY - MONITORING THYROID NODULE\par DERMATOLOGY: Q 6 MONTHS

## 2023-07-09 NOTE — COUNSELING
[Cessation strategies including cessation program discussed] : Cessation strategies including cessation program discussed [Use of nicotine replacement therapies and other medications discussed] : Use of nicotine replacement therapies and other medications discussed [Encouraged to pick a quit date and identify support needed to quit] : Encouraged to pick a quit date and identify support needed to quit [Smoking Cessation Program Referral] : Smoking Cessation Program Referral  [Yes] : Willing to quit smoking [Participate in Class] : Participate in class [ - Annual Lung Cancer Screening/Share Decision Making Discussion] : Annual Lung Cancer Screening/Share Decision Making Discussion. (I have advised this patient to have a Low Dose CT (LDCT) scan of the lungs and have discussed the following with the patient in a shared decision making discussion:   Benefits of Detection and Early Treatment: There is adequate evidence that annual screening for lung cancer with LDCT in a population of high-risk persons can prevent a substantial number of lung cancer–related deaths. The magnitude of benefit depends on the individual patient's risk for lung cancer, as those who are at highest risk are most likely to benefit. Screening cannot prevent most lung cancer–related deaths, and does not replace smoking cessation. Harms of Detection and Early Intervention and Treatment: The harms associated with LDCT screening include false-negative and false-positive results, incidental findings, over diagnosis, and radiation exposure. False-positive LDCT results occur in a substantial proportion of screened persons; 95% of all positive results do not lead to a diagnosis of cancer. In a high-quality screening program, further imaging can resolve most false-positive results; however, some patients may require invasive procedures. Radiation harms, including cancer resulting from cumulative exposure to radiation, vary depending on the age at the start of screening; the number of scans received; and the person's exposure to other sources of radiation, particularly other medical imaging.) [FreeTextEntry1] : 4

## 2023-07-09 NOTE — HEALTH RISK ASSESSMENT
[Very Good] : ~his/her~ current health as very good [Excellent] : ~his/her~  mood as  excellent [Intercurrent ED visits] : went to ED [Yes] : Yes [4 or more  times a week (4 pts)] : 4 or more  times a week (4 points) [1 or 2 (0 pts)] : 1 or 2 (0 points) [Never (0 pts)] : Never (0 points) [No] : In the past 12 months have you used drugs other than those required for medical reasons? No [Any fall with injury in past year] : Patient reported fall with injury in the past year [0] : 2) Feeling down, depressed, or hopeless: Not at all (0) [PHQ-2 Negative - No further assessment needed] : PHQ-2 Negative - No further assessment needed [HIV test declined] : HIV test declined [Hepatitis C test declined] : Hepatitis C test declined [None] : None [With Significant Other] : lives with significant other [Retired] : retired [College] : College [] :  [# Of Children ___] : has [unfilled] children [Feels Safe at Home] : Feels safe at home [Fully functional (bathing, dressing, toileting, transferring, walking, feeding)] : Fully functional (bathing, dressing, toileting, transferring, walking, feeding) [Fully functional (using the telephone, shopping, preparing meals, housekeeping, doing laundry, using] : Fully functional and needs no help or supervision to perform IADLs (using the telephone, shopping, preparing meals, housekeeping, doing laundry, using transportation, managing medications and managing finances) [Smoke Detector] : smoke detector [Carbon Monoxide Detector] : carbon monoxide detector [Safety elements used in home] : safety elements used in home [Seat Belt] :  uses seat belt [Sunscreen] : uses sunscreen [Designated Healthcare Proxy] : Designated healthcare proxy [Name: ___] : Health Care Proxy's Name: [unfilled]  [Relationship: ___] : Relationship: [unfilled] [Current] : Current [20 or more] : 20 or more [de-identified] : AFTER PICKLEBALL INJURY [Audit-CScore] : 4 [de-identified] : SOME WALKING, PHYSICAL THERAPY TWICE A WEEK AND HOME EXERCISES DAILY [de-identified] : HEALTHY DIET.  MEDITERRANEAN DIET [de-identified] : SEE HPI [[id number]] : 0 [Change in mental status noted] : No change in mental status noted [Language] : denies difficulty with language [Learning/Retaining New Information] : denies difficulty learning/retaining new information [Handling Complex Tasks] : denies difficulty handling complex tasks [Reports changes in hearing] : Reports no changes in hearing [Reports changes in vision] : Reports no changes in vision [Reports normal functional visual acuity (ie: able to read med bottle)] : Reports poor functional visual acuity.  [Reports changes in dental health] : Reports no changes in dental health [MammogramDate] : 05/23 [MammogramComments] : BREAST SURGERY - PATCHOGUE; GSB [PapSmearDate] : 12/20 [BoneDensityDate] : 03/22 [ColonoscopyDate] : 09/22 [de-identified] : SEEING OPHTHALMOLOGY ROUTINELY; MACULAR DEGENERATION; GLASSES FOR DISTANCE AND READING, USES MAGNIFYER [AdvancecareDate] : 07/23 [de-identified] : currently smoking about 1/2 ppd

## 2023-07-09 NOTE — PLAN
[FreeTextEntry1] : VISION SCREENING\par SAFETY / HEALTHY HABITS REVIEWED\par HEALTHY DIET AND LIFESTYLE MODIFICATIONS\par VACCINATIONS DISCUSSED: COVID, FLU\par PREVNAR GIVEN AND TOLERATED WELL\par CHECK ROUTINE LAB WORK\par SCREENING CT CHEST\par SMOKING CESSATION COUNSELLING PROVIDED FOR FOUR MINUTES.  DISCUSSED RISKS OF TOBACCO USE AND METHODS TO QUIT.  DISCUSSED SMOKING CESSATION PROGRAM AT [facility] \par TO FOLLOW-UP IN TWO WEEKS FOR BLOOD PRESSURE CHECK; BLOOD PRESSURE OKAY WITH RHEUMATOLOGIST VISIT RECENTLY\par EKG: SINUS AT 59 BPM, INVERTED T IN III - CARDIOLOGY FOLLOW-UP\par FOLLOW-UP ALL SPECIALISTS AS DIRECTED\par CALL WITH ANY QUESTIONS, CONCERNS OR CHANGES

## 2023-07-12 ENCOUNTER — LABORATORY RESULT (OUTPATIENT)
Age: 68
End: 2023-07-12

## 2023-07-13 ENCOUNTER — TRANSCRIPTION ENCOUNTER (OUTPATIENT)
Age: 68
End: 2023-07-13

## 2023-07-14 LAB
25(OH)D3 SERPL-MCNC: 50.5 NG/ML
ALBUMIN SERPL ELPH-MCNC: 4.5 G/DL
ALP BLD-CCNC: 88 U/L
ALT SERPL-CCNC: 33 U/L
ANION GAP SERPL CALC-SCNC: 14 MMOL/L
APPEARANCE: CLEAR
AST SERPL-CCNC: 38 U/L
BILIRUB SERPL-MCNC: 0.4 MG/DL
BILIRUBIN URINE: NEGATIVE
BLOOD URINE: NEGATIVE
BUN SERPL-MCNC: 9 MG/DL
CALCIUM SERPL-MCNC: 9.1 MG/DL
CHLORIDE SERPL-SCNC: 101 MMOL/L
CHOLEST SERPL-MCNC: 179 MG/DL
CO2 SERPL-SCNC: 21 MMOL/L
COLOR: YELLOW
CREAT SERPL-MCNC: 0.45 MG/DL
EGFR: 105 ML/MIN/1.73M2
ESTIMATED AVERAGE GLUCOSE: 117 MG/DL
GLUCOSE QUALITATIVE U: NEGATIVE MG/DL
GLUCOSE SERPL-MCNC: 97 MG/DL
HBA1C MFR BLD HPLC: 5.7 %
HDLC SERPL-MCNC: 96 MG/DL
KETONES URINE: NEGATIVE MG/DL
LDLC SERPL CALC-MCNC: 71 MG/DL
LEUKOCYTE ESTERASE URINE: ABNORMAL
NITRITE URINE: POSITIVE
NONHDLC SERPL-MCNC: 83 MG/DL
PH URINE: 7.5
POTASSIUM SERPL-SCNC: 4.9 MMOL/L
PROT SERPL-MCNC: 6.8 G/DL
PROTEIN URINE: NEGATIVE MG/DL
SODIUM SERPL-SCNC: 137 MMOL/L
SPECIFIC GRAVITY URINE: 1.01
TRIGL SERPL-MCNC: 61 MG/DL
UROBILINOGEN URINE: 0.2 MG/DL

## 2023-08-01 ENCOUNTER — APPOINTMENT (OUTPATIENT)
Dept: ORTHOPEDIC SURGERY | Facility: CLINIC | Age: 68
End: 2023-08-01
Payer: MEDICARE

## 2023-08-01 VITALS — BODY MASS INDEX: 23.59 KG/M2 | HEIGHT: 59 IN | WEIGHT: 117 LBS

## 2023-08-01 PROCEDURE — 99213 OFFICE O/P EST LOW 20 MIN: CPT

## 2023-08-01 NOTE — PHYSICAL EXAM
[NL (0-70)] : full internal rotation 0-70 degrees [Sitting] : sitting [Mild] : mild [5 ___] : forward flexion 5[unfilled]/5 [Left] : left knee [NL (0)] : extension 0 degrees [5___] : hamstring 5[unfilled]/5 [Negative] : negative Manas's [de-identified] : external rotation 80 degrees [] : no tenderness to palpation [de-identified] : active abduction 140 degrees [TWNoteComboBox7] : flexion 120 degrees

## 2023-08-01 NOTE — HISTORY OF PRESENT ILLNESS
[4] : 4 [Dull/Aching] : dull/aching [Localized] : localized [Physical therapy] : physical therapy [Injection therapy] : injection therapy [Sitting] : sitting [] : no [FreeTextEntry1] : left knee/shoulder [FreeTextEntry6] : in the knee [FreeTextEntry9] : HEP [de-identified] : pressure back of leg

## 2023-08-01 NOTE — DISCUSSION/SUMMARY
[de-identified] : General Dx Discussion The patient was advised of the diagnosis. The natural history of the pathology was explained in full to the patient in layman's terms. All questions were answered. The risks and benefits of surgical and non-surgical treatment alternatives were explained in full to the patient.  Doing well  will cont HEP  f/u prn

## 2023-08-03 ENCOUNTER — APPOINTMENT (OUTPATIENT)
Dept: ORTHOPEDIC SURGERY | Facility: CLINIC | Age: 68
End: 2023-08-03
Payer: MEDICARE

## 2023-08-03 VITALS — WEIGHT: 117 LBS | BODY MASS INDEX: 23.59 KG/M2 | HEIGHT: 59 IN

## 2023-08-03 DIAGNOSIS — M16.12 UNILATERAL PRIMARY OSTEOARTHRITIS, LEFT HIP: ICD-10-CM

## 2023-08-03 PROCEDURE — 99214 OFFICE O/P EST MOD 30 MIN: CPT

## 2023-08-03 NOTE — ASSESSMENT
[FreeTextEntry1] : 68F p/w L hip OA, labrum tear  Continue PT Meloxicam for pain return 6-8 weeks  The natural progression of Osteoarthritis was explained to the patient. We discussed the possible treatment options from conservative to operative. These included NSAIDS, Glucosamine and Chondrotin sulfate, and Physical Therapy. We also discussed that at some point they may progress to needed a CHICA. Information and pamphlets were given.

## 2023-08-03 NOTE — HISTORY OF PRESENT ILLNESS
[5] : 5 [0] : 0 [Retired] : Work status: retired [de-identified] : 6/1/2023: 67 y/o female with c/o left groin pain for the past 4 years but worse of the past 6 months after a fall while playing pickle ball. Takes Aleve and Tylenol PRN. Has not done recent PT for the hip. Has history of severe lumbar stenosis and degenerative scoliosis.  8/3/23 f/u L hip. pain has improved with m,eloxicam and PT, still present but much better [de-identified] : p/t

## 2023-08-03 NOTE — PHYSICAL EXAM
[FreeTextEntry8] : tenderness lower lumbar  [Left] : left hip [] : full ROM without pain  [5___] : adduction 5[unfilled]/5 [FreeTextEntry3] : palpable mass proximal 1/3 lateral thigh

## 2023-08-09 ENCOUNTER — TRANSCRIPTION ENCOUNTER (OUTPATIENT)
Age: 68
End: 2023-08-09

## 2023-08-25 ENCOUNTER — APPOINTMENT (OUTPATIENT)
Dept: FAMILY MEDICINE | Facility: CLINIC | Age: 68
End: 2023-08-25
Payer: MEDICARE

## 2023-08-25 VITALS
HEIGHT: 59 IN | HEART RATE: 90 BPM | OXYGEN SATURATION: 96 % | SYSTOLIC BLOOD PRESSURE: 122 MMHG | BODY MASS INDEX: 23.79 KG/M2 | DIASTOLIC BLOOD PRESSURE: 86 MMHG | WEIGHT: 118 LBS

## 2023-08-25 PROCEDURE — 99213 OFFICE O/P EST LOW 20 MIN: CPT

## 2023-08-25 NOTE — PHYSICAL EXAM
[No Acute Distress] : no acute distress [Well Nourished] : well nourished [Well-Appearing] : well-appearing [Normal Sclera/Conjunctiva] : normal sclera/conjunctiva [PERRL] : pupils equal round and reactive to light [No Respiratory Distress] : no respiratory distress  [Clear to Auscultation] : lungs were clear to auscultation bilaterally [No Accessory Muscle Use] : no accessory muscle use [Normal Rate] : normal rate  [Regular Rhythm] : with a regular rhythm [Normal S1, S2] : normal S1 and S2 [No Joint Swelling] : no joint swelling [Speech Grossly Normal] : speech grossly normal [Memory Grossly Normal] : memory grossly normal [Normal Mood] : the mood was normal

## 2023-08-25 NOTE — HISTORY OF PRESENT ILLNESS
[FreeTextEntry1] : bp check PRESCRIPTIONS [de-identified] : MS. BEARD IS A PLEASANT 67 YO PRESENTING FOR FOLLOW-UP AND FOR BLOOD PRESSURE CHECK.  HISTORY OF ANXIETY AND DEPRESSION.  HAS BEEN ON PAROXETINE AND GABAPENTIN FROM PSYCHIATRIST FOR TREATMENT AT [facility]REACH AL[facility]HOL PROGRAM.  THEY HAVE BEEN VERY EFFECTIVE.  HAS BEEN SEEING THERAPIST.  HAS DONE VERY WELL AND WILL NOT BE CONTINUING AT REHAB CENTER.  WOULD LIKE ME TO TAKE OVER PRESCRIPTIONS.  IS NOT DEPRESSED AT THIS TIME.  ONCE IN AWHILE A LITTLE ANXIOUS BUT OVERALL NO ANXIETY.  IS HANDLING STRESSORS BETTER.  NO SUICIDAL/HOMICIDAL IDEATIONS OR PLANS.

## 2023-08-25 NOTE — PLAN
[FreeTextEntry1] : WILL RENEW PRESCRIPTIONS STRESS REDUCTION BLOOD PRESSURE WELL CONTROLLED TODAY PRIOR LAB WORK REVIEWED FOLLOW-UP ALL SPECIALISTS AS DIRECTED  CALL WITH ANY QUESTIONS, CONCERNS OR CHANGES

## 2023-09-11 ENCOUNTER — RX RENEWAL (OUTPATIENT)
Age: 68
End: 2023-09-11

## 2023-09-13 ENCOUNTER — APPOINTMENT (OUTPATIENT)
Dept: FAMILY MEDICINE | Facility: CLINIC | Age: 68
End: 2023-09-13

## 2023-10-16 ENCOUNTER — APPOINTMENT (OUTPATIENT)
Dept: FAMILY MEDICINE | Facility: CLINIC | Age: 68
End: 2023-10-16
Payer: MEDICARE

## 2023-10-16 ENCOUNTER — NON-APPOINTMENT (OUTPATIENT)
Age: 68
End: 2023-10-16

## 2023-10-16 VITALS — BODY MASS INDEX: 23.79 KG/M2 | WEIGHT: 118 LBS | HEIGHT: 59 IN

## 2023-10-16 VITALS — DIASTOLIC BLOOD PRESSURE: 80 MMHG | SYSTOLIC BLOOD PRESSURE: 144 MMHG

## 2023-10-16 VITALS — SYSTOLIC BLOOD PRESSURE: 154 MMHG | DIASTOLIC BLOOD PRESSURE: 74 MMHG

## 2023-10-16 VITALS
SYSTOLIC BLOOD PRESSURE: 160 MMHG | DIASTOLIC BLOOD PRESSURE: 82 MMHG | OXYGEN SATURATION: 99 % | HEART RATE: 61 BPM | HEIGHT: 59 IN | BODY MASS INDEX: 23.99 KG/M2 | WEIGHT: 119 LBS

## 2023-10-16 DIAGNOSIS — Z23 ENCOUNTER FOR IMMUNIZATION: ICD-10-CM

## 2023-10-16 DIAGNOSIS — F17.210 NICOTINE DEPENDENCE, CIGARETTES, UNCOMPLICATED: ICD-10-CM

## 2023-10-16 PROCEDURE — 93000 ELECTROCARDIOGRAM COMPLETE: CPT

## 2023-10-16 PROCEDURE — 99214 OFFICE O/P EST MOD 30 MIN: CPT | Mod: 25

## 2023-10-16 PROCEDURE — 90662 IIV NO PRSV INCREASED AG IM: CPT

## 2023-10-16 PROCEDURE — G0008: CPT

## 2023-10-19 ENCOUNTER — APPOINTMENT (OUTPATIENT)
Dept: CT IMAGING | Facility: CLINIC | Age: 68
End: 2023-10-19
Payer: MEDICARE

## 2023-10-19 ENCOUNTER — OUTPATIENT (OUTPATIENT)
Dept: OUTPATIENT SERVICES | Facility: HOSPITAL | Age: 68
LOS: 1 days | End: 2023-10-19
Payer: MEDICARE

## 2023-10-19 DIAGNOSIS — Z98.891 HISTORY OF UTERINE SCAR FROM PREVIOUS SURGERY: Chronic | ICD-10-CM

## 2023-10-19 DIAGNOSIS — K37 UNSPECIFIED APPENDICITIS: Chronic | ICD-10-CM

## 2023-10-19 DIAGNOSIS — F17.210 NICOTINE DEPENDENCE, CIGARETTES, UNCOMPLICATED: ICD-10-CM

## 2023-10-19 DIAGNOSIS — Z98.890 OTHER SPECIFIED POSTPROCEDURAL STATES: Chronic | ICD-10-CM

## 2023-10-19 PROCEDURE — 71271 CT THORAX LUNG CANCER SCR C-: CPT | Mod: 26

## 2023-10-19 PROCEDURE — 71271 CT THORAX LUNG CANCER SCR C-: CPT

## 2023-10-22 ENCOUNTER — RX RENEWAL (OUTPATIENT)
Age: 68
End: 2023-10-22

## 2023-10-22 PROBLEM — Z23 FLU VACCINE NEED: Status: ACTIVE | Noted: 2023-10-22

## 2023-10-25 ENCOUNTER — APPOINTMENT (OUTPATIENT)
Dept: FAMILY MEDICINE | Facility: CLINIC | Age: 68
End: 2023-10-25
Payer: MEDICARE

## 2023-10-25 VITALS
SYSTOLIC BLOOD PRESSURE: 134 MMHG | BODY MASS INDEX: 24.19 KG/M2 | HEART RATE: 74 BPM | WEIGHT: 120 LBS | OXYGEN SATURATION: 98 % | HEIGHT: 59 IN | DIASTOLIC BLOOD PRESSURE: 82 MMHG

## 2023-10-25 VITALS — DIASTOLIC BLOOD PRESSURE: 76 MMHG | SYSTOLIC BLOOD PRESSURE: 132 MMHG

## 2023-10-25 DIAGNOSIS — R82.90 UNSPECIFIED ABNORMAL FINDINGS IN URINE: ICD-10-CM

## 2023-10-25 LAB
BILIRUB UR QL STRIP: NEGATIVE
GLUCOSE UR-MCNC: NEGATIVE
HCG UR QL: 0.2 EU/DL
HGB UR QL STRIP.AUTO: NEGATIVE
KETONES UR-MCNC: NEGATIVE
LEUKOCYTE ESTERASE UR QL STRIP: NEGATIVE
NITRITE UR QL STRIP: NEGATIVE
PH UR STRIP: 7
PROT UR STRIP-MCNC: NEGATIVE
SP GR UR STRIP: 1.01

## 2023-10-25 PROCEDURE — 99214 OFFICE O/P EST MOD 30 MIN: CPT | Mod: 25

## 2023-10-25 PROCEDURE — 81003 URINALYSIS AUTO W/O SCOPE: CPT | Mod: QW

## 2023-11-01 LAB — BACTERIA UR CULT: ABNORMAL

## 2023-11-05 PROBLEM — R82.90 ABNORMAL URINE ODOR: Status: ACTIVE | Noted: 2023-10-25

## 2023-11-06 ENCOUNTER — NON-APPOINTMENT (OUTPATIENT)
Age: 68
End: 2023-11-06

## 2023-11-12 LAB
ANION GAP SERPL CALC-SCNC: 10 MMOL/L
BUN SERPL-MCNC: 12 MG/DL
CALCIUM SERPL-MCNC: 9.2 MG/DL
CHLORIDE SERPL-SCNC: 99 MMOL/L
CO2 SERPL-SCNC: 27 MMOL/L
CREAT SERPL-MCNC: 0.5 MG/DL
EGFR: 102 ML/MIN/1.73M2
GLUCOSE SERPL-MCNC: 81 MG/DL
POTASSIUM SERPL-SCNC: 5.3 MMOL/L
SODIUM SERPL-SCNC: 136 MMOL/L

## 2023-11-13 ENCOUNTER — APPOINTMENT (OUTPATIENT)
Dept: DERMATOLOGY | Facility: CLINIC | Age: 68
End: 2023-11-13
Payer: MEDICARE

## 2023-11-13 PROCEDURE — 11102 TANGNTL BX SKIN SINGLE LES: CPT | Mod: 59

## 2023-11-13 PROCEDURE — 99213 OFFICE O/P EST LOW 20 MIN: CPT | Mod: 25

## 2023-11-13 PROCEDURE — 17000 DESTRUCT PREMALG LESION: CPT | Mod: 59

## 2023-11-13 PROCEDURE — 17003 DESTRUCT PREMALG LES 2-14: CPT | Mod: 59

## 2023-11-13 PROCEDURE — 17110 DESTRUCTION B9 LES UP TO 14: CPT

## 2023-11-28 ENCOUNTER — NON-APPOINTMENT (OUTPATIENT)
Age: 68
End: 2023-11-28

## 2023-11-29 ENCOUNTER — APPOINTMENT (OUTPATIENT)
Dept: FAMILY MEDICINE | Facility: CLINIC | Age: 68
End: 2023-11-29
Payer: MEDICARE

## 2023-11-29 VITALS
BODY MASS INDEX: 24.39 KG/M2 | HEIGHT: 59 IN | HEART RATE: 87 BPM | SYSTOLIC BLOOD PRESSURE: 120 MMHG | DIASTOLIC BLOOD PRESSURE: 84 MMHG | WEIGHT: 121 LBS | OXYGEN SATURATION: 98 %

## 2023-11-29 DIAGNOSIS — R82.90 UNSPECIFIED ABNORMAL FINDINGS IN URINE: ICD-10-CM

## 2023-11-29 PROCEDURE — 99213 OFFICE O/P EST LOW 20 MIN: CPT

## 2023-12-04 ENCOUNTER — TRANSCRIPTION ENCOUNTER (OUTPATIENT)
Age: 68
End: 2023-12-04

## 2023-12-05 ENCOUNTER — OUTPATIENT (OUTPATIENT)
Dept: OUTPATIENT SERVICES | Facility: HOSPITAL | Age: 68
LOS: 1 days | End: 2023-12-05
Payer: MEDICARE

## 2023-12-05 ENCOUNTER — RX RENEWAL (OUTPATIENT)
Age: 68
End: 2023-12-05

## 2023-12-05 DIAGNOSIS — M16.10 UNILATERAL PRIMARY OSTEOARTHRITIS, UNSPECIFIED HIP: ICD-10-CM

## 2023-12-05 DIAGNOSIS — Z98.891 HISTORY OF UTERINE SCAR FROM PREVIOUS SURGERY: Chronic | ICD-10-CM

## 2023-12-05 DIAGNOSIS — Z98.890 OTHER SPECIFIED POSTPROCEDURAL STATES: Chronic | ICD-10-CM

## 2023-12-05 DIAGNOSIS — K37 UNSPECIFIED APPENDICITIS: Chronic | ICD-10-CM

## 2023-12-05 PROCEDURE — 0441T ABLTJ PERC LXTR/PERPH NRV: CPT | Mod: LT

## 2023-12-05 PROCEDURE — 76000 FLUOROSCOPY <1 HR PHYS/QHP: CPT

## 2023-12-22 LAB — BACTERIA UR CULT: ABNORMAL

## 2024-01-03 ENCOUNTER — TRANSCRIPTION ENCOUNTER (OUTPATIENT)
Age: 69
End: 2024-01-03

## 2024-01-16 ENCOUNTER — APPOINTMENT (OUTPATIENT)
Dept: ORTHOPEDIC SURGERY | Facility: CLINIC | Age: 69
End: 2024-01-16
Payer: MEDICARE

## 2024-01-16 VITALS — WEIGHT: 121 LBS | BODY MASS INDEX: 24.39 KG/M2 | HEIGHT: 59 IN

## 2024-01-16 PROCEDURE — 20611 DRAIN/INJ JOINT/BURSA W/US: CPT | Mod: LT

## 2024-01-16 PROCEDURE — 99212 OFFICE O/P EST SF 10 MIN: CPT | Mod: 25

## 2024-01-16 NOTE — HISTORY OF PRESENT ILLNESS
[8] : 8 [5] : 5 [Radiating] : radiating [Physical therapy] : physical therapy [Sitting] : sitting [Standing] : standing [Stairs] : stairs [Retired] : Work status: retired [de-identified] : Here for f/u left knee for Euflexxa injection. [] : no [FreeTextEntry1] : left shoulder/ pain [FreeTextEntry7] : left hip [FreeTextEntry9] : 2 aleve ES and 2 ES Tylenol [de-identified] : getting up

## 2024-01-16 NOTE — DISCUSSION/SUMMARY
[de-identified] : General Dx Discussion The patient was advised of the diagnosis. The natural history of the pathology was explained in full to the patient in layman's terms. All questions were answered. The risks and benefits of surgical and non-surgical treatment alternatives were explained in full to the patient.  Case Discussed. Euflexxa tolerated well.  f/u 1 week to continue series.     Entered by Candelaria GODWIN acting as a scribe. Instructions: Dr. Smith- The documentation recorded by the scribe accurately reflects the service I personally performed and the decisions made by me.

## 2024-01-16 NOTE — REVIEW OF SYSTEMS
[Joint Pain] : joint pain [Joint Swelling] : joint swelling [Negative] : Heme/Lymph [FreeTextEntry9] : l knee

## 2024-01-16 NOTE — PHYSICAL EXAM
[de-identified] : active abduction 140 degrees [Left] : left knee [NL (0)] : extension 0 degrees [5___] : hamstring 5[unfilled]/5 [] : patient ambulates without assistive device [TWNoteComboBox7] : flexion 120 degrees

## 2024-01-23 ENCOUNTER — APPOINTMENT (OUTPATIENT)
Dept: ORTHOPEDIC SURGERY | Facility: CLINIC | Age: 69
End: 2024-01-23
Payer: MEDICARE

## 2024-01-23 VITALS — HEIGHT: 59 IN | WEIGHT: 121 LBS | BODY MASS INDEX: 24.39 KG/M2

## 2024-01-23 PROCEDURE — 99024 POSTOP FOLLOW-UP VISIT: CPT

## 2024-01-23 PROCEDURE — 20610 DRAIN/INJ JOINT/BURSA W/O US: CPT | Mod: LT

## 2024-01-23 NOTE — PROCEDURE
[Large Joint Injection] : Large joint injection [Left] : of the left [Knee] : knee [Pain] : pain [Inflammation] : inflammation [X-ray evidence of Osteoarthritis on this or prior visit] : x-ray evidence of Osteoarthritis on this or prior visit [Alcohol] : alcohol [Betadine] : betadine [Ethyl Chloride sprayed topically] : ethyl chloride sprayed topically [Euflexxa(20mg)] : 20mg of Euflexxa [Sterile technique used] : sterile technique used [#2] : series #2 [Call if redness, pain or fever occur] : call if redness, pain or fever occur [] : Patient tolerated procedure well [Apply ice for 15min out of every hour for the next 12-24 hours as tolerated] : apply ice for 15 minutes out of every hour for the next 12-24 hours as tolerated [Previous OTC use and PT nontherapeutic] : patient has tried OTC's including aspirin, Ibuprofen, Aleve, etc or prescription NSAIDS, and/or exercises at home and/or physical therapy without satisfactory response [Patient had decreased mobility in the joint] : patient had decreased mobility in the joint [Risks, benefits, alternatives discussed / Verbal consent obtained] : the risks benefits, and alternatives have been discussed, and verbal consent was obtained [Prior failure or difficult injection] : prior failure or difficult injection [Altered anatomic landmarks d/t erosive arthritis] : altered anatomic landmarks d/t erosive arthritis [All ultrasound images have been permanently captured and stored accordingly in our picture archiving and communication system] : All ultrasound images have been permanently captured and stored accordingly in our picture archiving and communication system

## 2024-01-24 NOTE — DISCUSSION/SUMMARY
[de-identified] : Case Discussed. Euflexxa tolerated well.  f/u 1 week to continue series.     Entered by Kristin GODWIN acting as a scribe. Instructions: Dr. Smith- The documentation recorded by the scribe accurately reflects the service I personally performed and the decisions made by me.

## 2024-01-24 NOTE — REVIEW OF SYSTEMS
[Joint Pain] : joint pain [Negative] : Heme/Lymph [Joint Swelling] : joint swelling [FreeTextEntry9] : l knee

## 2024-01-24 NOTE — PHYSICAL EXAM
[de-identified] : active abduction 140 degrees [Left] : left knee [NL (0)] : extension 0 degrees [5___] : hamstring 5[unfilled]/5 [] : patient ambulates without assistive device [TWNoteComboBox7] : flexion 120 degrees

## 2024-01-24 NOTE — HISTORY OF PRESENT ILLNESS
[Retired] : Work status: retired [2] : 2 [Euflexxa] : Euflexxa [Dull/Aching] : dull/aching [Localized] : localized [Walking] : walking [8] : 8 [5] : 5 [Radiating] : radiating [Physical therapy] : physical therapy [Sitting] : sitting [Standing] : standing [Stairs] : stairs [de-identified] : Here for f/u left knee for Euflexxa injection. No new issues. [] : no [FreeTextEntry7] : left hip [FreeTextEntry1] : left shoulder/ pain [FreeTextEntry9] : 2 aleve ES and 2 ES Tylenol [de-identified] : getting up [de-identified] : 1-16-24 [de-identified] : l knee

## 2024-01-29 ENCOUNTER — RX RENEWAL (OUTPATIENT)
Age: 69
End: 2024-01-29

## 2024-01-30 ENCOUNTER — APPOINTMENT (OUTPATIENT)
Dept: ORTHOPEDIC SURGERY | Facility: CLINIC | Age: 69
End: 2024-01-30
Payer: MEDICARE

## 2024-01-30 ENCOUNTER — APPOINTMENT (OUTPATIENT)
Dept: FAMILY MEDICINE | Facility: CLINIC | Age: 69
End: 2024-01-30
Payer: MEDICARE

## 2024-01-30 VITALS
HEIGHT: 59 IN | SYSTOLIC BLOOD PRESSURE: 102 MMHG | HEART RATE: 93 BPM | WEIGHT: 126 LBS | OXYGEN SATURATION: 97 % | BODY MASS INDEX: 25.4 KG/M2 | DIASTOLIC BLOOD PRESSURE: 66 MMHG

## 2024-01-30 VITALS — DIASTOLIC BLOOD PRESSURE: 76 MMHG | SYSTOLIC BLOOD PRESSURE: 108 MMHG

## 2024-01-30 VITALS — WEIGHT: 121 LBS | HEIGHT: 59 IN | BODY MASS INDEX: 24.39 KG/M2

## 2024-01-30 DIAGNOSIS — M17.12 UNILATERAL PRIMARY OSTEOARTHRITIS, LEFT KNEE: ICD-10-CM

## 2024-01-30 LAB — BACTERIA UR CULT: ABNORMAL

## 2024-01-30 PROCEDURE — 20611 DRAIN/INJ JOINT/BURSA W/US: CPT | Mod: LT

## 2024-01-30 PROCEDURE — 99213 OFFICE O/P EST LOW 20 MIN: CPT

## 2024-01-30 PROCEDURE — G2211 COMPLEX E/M VISIT ADD ON: CPT

## 2024-01-30 NOTE — PROCEDURE
[Large Joint Injection] : Large joint injection [Left] : of the left [Knee] : knee [Pain] : pain [Inflammation] : inflammation [X-ray evidence of Osteoarthritis on this or prior visit] : x-ray evidence of Osteoarthritis on this or prior visit [Alcohol] : alcohol [Betadine] : betadine [Ethyl Chloride sprayed topically] : ethyl chloride sprayed topically [Sterile technique used] : sterile technique used [Euflexxa(20mg)] : 20mg of Euflexxa [#3] : series #3 [] : Patient tolerated procedure well [Call if redness, pain or fever occur] : call if redness, pain or fever occur [Apply ice for 15min out of every hour for the next 12-24 hours as tolerated] : apply ice for 15 minutes out of every hour for the next 12-24 hours as tolerated [Previous OTC use and PT nontherapeutic] : patient has tried OTC's including aspirin, Ibuprofen, Aleve, etc or prescription NSAIDS, and/or exercises at home and/or physical therapy without satisfactory response [Patient had decreased mobility in the joint] : patient had decreased mobility in the joint [Risks, benefits, alternatives discussed / Verbal consent obtained] : the risks benefits, and alternatives have been discussed, and verbal consent was obtained [Prior failure or difficult injection] : prior failure or difficult injection [Altered anatomic landmarks d/t erosive arthritis] : altered anatomic landmarks d/t erosive arthritis [All ultrasound images have been permanently captured and stored accordingly in our picture archiving and communication system] : All ultrasound images have been permanently captured and stored accordingly in our picture archiving and communication system

## 2024-01-30 NOTE — HISTORY OF PRESENT ILLNESS
[4] : 4 [Radiating] : radiating [Injection therapy] : injection therapy [Retired] : Work status: retired [3] : 3 [Euflexxa] : Euflexxa [de-identified] : Here for f/u left knee for Euflexxa injection.  [] : no [FreeTextEntry1] : Left knee [FreeTextEntry7] : constant hip pain [de-identified] : 1-23-24 [de-identified] : l knee

## 2024-01-30 NOTE — DISCUSSION/SUMMARY
[de-identified] : General Dx Discussion The patient was advised of the diagnosis. The natural history of the pathology was explained in full to the patient in layman's terms. All questions were answered. The risks and benefits of surgical and non-surgical treatment alternatives were explained in full to the patient.  Case Discussed. Euflexxa tolerated well.  f/u in May. Discussed getting an mri left shoulder at that time.     Entered by Candelaria GODWIN acting as a scribe. Instructions: Dr. Smith- The documentation recorded by the scribe accurately reflects the service I personally performed and the decisions made by me.

## 2024-01-30 NOTE — HISTORY OF PRESENT ILLNESS
[de-identified] : NOT TAKING LOSARTAN.  GOT HEADACHES FROM IT.   SAW OPHTHALMOLOGY AND ALL OK DUE TO BLURRY VISION. SYMPTOMS ALMOST ALL RESOLVED SINCE STOPPING MEDICATION. BP AT HOME 130 / 80 LAST COUPLE OF DAYS NO URINARY SYMPTOMS HAD INJECTION KNEE WAS TAKING ALEVE NOW NOT

## 2024-03-07 ENCOUNTER — APPOINTMENT (OUTPATIENT)
Dept: FAMILY MEDICINE | Facility: CLINIC | Age: 69
End: 2024-03-07
Payer: MEDICARE

## 2024-03-07 VITALS
HEART RATE: 75 BPM | HEIGHT: 59 IN | SYSTOLIC BLOOD PRESSURE: 122 MMHG | WEIGHT: 123 LBS | BODY MASS INDEX: 24.8 KG/M2 | DIASTOLIC BLOOD PRESSURE: 72 MMHG | OXYGEN SATURATION: 100 %

## 2024-03-07 VITALS — DIASTOLIC BLOOD PRESSURE: 68 MMHG | SYSTOLIC BLOOD PRESSURE: 112 MMHG

## 2024-03-07 PROCEDURE — G2211 COMPLEX E/M VISIT ADD ON: CPT

## 2024-03-07 PROCEDURE — 99213 OFFICE O/P EST LOW 20 MIN: CPT

## 2024-03-07 RX ORDER — LOSARTAN POTASSIUM 25 MG/1
25 TABLET, FILM COATED ORAL
Qty: 90 | Refills: 1 | Status: DISCONTINUED | COMMUNITY
Start: 2023-10-16 | End: 2024-03-07

## 2024-03-07 RX ORDER — CEPHALEXIN 500 MG/1
500 CAPSULE ORAL 3 TIMES DAILY
Qty: 21 | Refills: 0 | Status: DISCONTINUED | COMMUNITY
Start: 2023-11-01 | End: 2024-03-07

## 2024-03-10 NOTE — PHYSICAL EXAM
[No Acute Distress] : no acute distress [Well-Appearing] : well-appearing [Well Nourished] : well nourished [No Respiratory Distress] : no respiratory distress  [PERRL] : pupils equal round and reactive to light [Normal Sclera/Conjunctiva] : normal sclera/conjunctiva [Clear to Auscultation] : lungs were clear to auscultation bilaterally [No Accessory Muscle Use] : no accessory muscle use [Regular Rhythm] : with a regular rhythm [Normal S1, S2] : normal S1 and S2 [Normal Rate] : normal rate  [No Joint Swelling] : no joint swelling [Speech Grossly Normal] : speech grossly normal [Grossly Normal Strength/Tone] : grossly normal strength/tone [Normal Mood] : the mood was normal [Memory Grossly Normal] : memory grossly normal

## 2024-03-10 NOTE — PLAN
[FreeTextEntry1] : BLOOD PRESSURE WELL CONTROLLED WILL MONITOR BLOOD PRESSURE OFF MEDICATION AT THIS TIME HEALTHY DIET AND LIFESTYLE MODIFICATIONS FEELING WELL ON CURRENT MEDICATIONS FOR ANXIETY AND DEPRESSION RX GABAPENTIN RENEWED FOLLOW-UP ALL SPECIALISTS AS DIRECTED INCLUDING UROLOGY CALL WITH ANY QUESTIONS, CONCERNS OR CHANGES

## 2024-03-10 NOTE — HISTORY OF PRESENT ILLNESS
[FreeTextEntry1] : F/U HYPERTENSION [de-identified] : MS. BEARD IS A PLEASANT 67 YO PRESENTING FOR FOLLOW-UP OF HYPERTENSION.  HOME BLOOD PRESSURE MONITOR CHECKED AND IS ACCURATE.  ALL GOOD READINGS.  's / 130's SYSTOLIC.  IS MONITORING OFF MEDICATION -LOSARTAN.  STILL NEEDS TO SEE UROLOGY.  ANXIETY AND DEPRESSION HAVE BEEN WELL CONTROLLED ON MEDICATION.  SOCIALIZING AND MOOD HAS BEEN GOOD.

## 2024-03-12 ENCOUNTER — APPOINTMENT (OUTPATIENT)
Dept: UROLOGY | Facility: CLINIC | Age: 69
End: 2024-03-12
Payer: MEDICARE

## 2024-03-12 VITALS
WEIGHT: 123.31 LBS | SYSTOLIC BLOOD PRESSURE: 119 MMHG | DIASTOLIC BLOOD PRESSURE: 76 MMHG | HEIGHT: 59 IN | BODY MASS INDEX: 24.86 KG/M2 | HEART RATE: 90 BPM

## 2024-03-12 LAB
BILIRUB UR QL STRIP: NORMAL
CLARITY UR: CLEAR
COLLECTION METHOD: NORMAL
GLUCOSE UR-MCNC: NORMAL
HCG UR QL: 0.2 EU/DL
HGB UR QL STRIP.AUTO: NORMAL
KETONES UR-MCNC: NORMAL
LEUKOCYTE ESTERASE UR QL STRIP: NORMAL
NITRITE UR QL STRIP: POSITIVE
PH UR STRIP: 7
PROT UR STRIP-MCNC: NORMAL
SP GR UR STRIP: 1.02

## 2024-03-12 PROCEDURE — 99204 OFFICE O/P NEW MOD 45 MIN: CPT

## 2024-03-12 RX ORDER — VALACYCLOVIR 1 G/1
1 TABLET, FILM COATED ORAL TWICE DAILY
Qty: 4 | Refills: 2 | Status: DISCONTINUED | COMMUNITY
Start: 2019-08-14 | End: 2024-03-12

## 2024-03-12 RX ORDER — ESTRADIOL 0.1 MG/G
0.1 CREAM VAGINAL
Qty: 1 | Refills: 5 | Status: ACTIVE | COMMUNITY
Start: 2024-03-12 | End: 1900-01-01

## 2024-03-12 RX ORDER — MELOXICAM 15 MG/1
15 TABLET ORAL
Qty: 30 | Refills: 1 | Status: DISCONTINUED | COMMUNITY
Start: 2023-06-08 | End: 2024-03-12

## 2024-03-12 NOTE — PHYSICAL EXAM
[Well Groomed] : well groomed [Normal Appearance] : normal appearance [General Appearance - In No Acute Distress] : no acute distress [Edema] : no peripheral edema [Abdomen Soft] : soft [Respiration, Rhythm And Depth] : normal respiratory rhythm and effort [Exaggerated Use Of Accessory Muscles For Inspiration] : no accessory muscle use [Abdomen Tenderness] : non-tender [Urinary Bladder Findings] : the bladder was normal on palpation [Normal Station and Gait] : the gait and station were normal for the patient's age [Costovertebral Angle Tenderness] : no ~M costovertebral angle tenderness [] : no rash [No Focal Deficits] : no focal deficits [Oriented To Time, Place, And Person] : oriented to person, place, and time [Affect] : the affect was normal [Mood] : the mood was normal

## 2024-03-12 NOTE — ASSESSMENT
[FreeTextEntry1] : Impression" Recurrent UTI  Plan:  estrace push fluids cranberry extracts D-Mannose follow up 6 weeks with PVR.  Recheck PVR

## 2024-03-12 NOTE — HISTORY OF PRESENT ILLNESS
[FreeTextEntry1] : Patient with recurrent UTI.  Has no significant symptoms with malodorous urine being the worst. no dysuria or fevers or chills. Drinks about 48 oz of fluid per day.

## 2024-03-12 NOTE — LETTER BODY
[Dear  ___] : Dear  [unfilled], [Courtesy Letter:] : I had the pleasure of seeing your patient, [unfilled], in my office today. [Please see my note below.] : Please see my note below. [Sincerely,] : Sincerely, [FreeTextEntry3] : Ed  Amando Bryson MD [facility]  [facility] [place] [facility] [employer] at [facility]

## 2024-03-13 ENCOUNTER — TRANSCRIPTION ENCOUNTER (OUTPATIENT)
Age: 69
End: 2024-03-13

## 2024-03-14 ENCOUNTER — TRANSCRIPTION ENCOUNTER (OUTPATIENT)
Age: 69
End: 2024-03-14

## 2024-03-15 ENCOUNTER — TRANSCRIPTION ENCOUNTER (OUTPATIENT)
Age: 69
End: 2024-03-15

## 2024-03-20 RX ORDER — BUDESONIDE 3 MG/1
3 CAPSULE, COATED PELLETS ORAL
Qty: 135 | Refills: 0 | Status: ACTIVE | COMMUNITY
Start: 2022-08-21

## 2024-05-01 ENCOUNTER — APPOINTMENT (OUTPATIENT)
Dept: GASTROENTEROLOGY | Facility: CLINIC | Age: 69
End: 2024-05-01
Payer: MEDICARE

## 2024-05-01 VITALS
HEIGHT: 59 IN | TEMPERATURE: 97.7 F | OXYGEN SATURATION: 95 % | WEIGHT: 124 LBS | RESPIRATION RATE: 14 BRPM | BODY MASS INDEX: 25 KG/M2 | DIASTOLIC BLOOD PRESSURE: 90 MMHG | HEART RATE: 88 BPM | SYSTOLIC BLOOD PRESSURE: 152 MMHG

## 2024-05-01 PROCEDURE — 99214 OFFICE O/P EST MOD 30 MIN: CPT

## 2024-05-02 ENCOUNTER — RESULT CHARGE (OUTPATIENT)
Age: 69
End: 2024-05-02

## 2024-05-02 ENCOUNTER — APPOINTMENT (OUTPATIENT)
Dept: UROLOGY | Facility: CLINIC | Age: 69
End: 2024-05-02
Payer: MEDICARE

## 2024-05-02 DIAGNOSIS — R35.0 FREQUENCY OF MICTURITION: ICD-10-CM

## 2024-05-02 LAB
BILIRUB UR QL STRIP: NORMAL
CLARITY UR: CLEAR
COLLECTION METHOD: NORMAL
GLUCOSE UR-MCNC: NORMAL
HCG UR QL: 0.2 EU/DL
HGB UR QL STRIP.AUTO: NORMAL
KETONES UR-MCNC: NORMAL
LEUKOCYTE ESTERASE UR QL STRIP: ABNORMAL
NITRITE UR QL STRIP: NORMAL
PH UR STRIP: 7
PROT UR STRIP-MCNC: NORMAL
SP GR UR STRIP: 1.01

## 2024-05-02 PROCEDURE — 99213 OFFICE O/P EST LOW 20 MIN: CPT

## 2024-05-02 PROCEDURE — 51798 US URINE CAPACITY MEASURE: CPT

## 2024-05-02 NOTE — LETTER BODY
[Dear  ___] : Dear  [unfilled], [Courtesy Letter:] : I had the pleasure of seeing your patient, [unfilled], in my office today. [Please see my note below.] : Please see my note below. [Sincerely,] : Sincerely, [FreeTextEntry3] : Ed  Amando Bryson MD [facility]  of [facility] [place] [facility] [employer] at [facility]

## 2024-05-02 NOTE — HISTORY OF PRESENT ILLNESS
[FreeTextEntry1] : Placed on estrogen cream, vaginally.  Has increased her fluid intake. no problems except some increased frequency. no hematuria or dysuria.

## 2024-05-02 NOTE — PHYSICAL EXAM
[General Appearance - Well Developed] : well developed [Edema] : no peripheral edema [] : no respiratory distress [Normal Station and Gait] : the gait and station were normal for the patient's age [Skin Color & Pigmentation] : normal skin color and pigmentation [Oriented To Time, Place, And Person] : oriented to person, place, and time

## 2024-05-03 ENCOUNTER — TRANSCRIPTION ENCOUNTER (OUTPATIENT)
Age: 69
End: 2024-05-03

## 2024-05-03 NOTE — PLAN
[TextEntry] : Plan of care discussed with Dr. Lara who agreed with current plan.  Reviewed biopsy results in detail.  Will initiate taper off Budesonide, 3-2-1 at 1 week intervals.  Will obtain fecal calprotectin upon cessation of Budesonide.  Will transition to Mesalamine (Lialda) 5-ASA for suspected mild ulcerative colitis versus lymphocytic colitis.  If symptoms persist, given likely proctocolitis predominance from biopsies will consider additional of topical therapy.

## 2024-05-03 NOTE — HISTORY OF PRESENT ILLNESS
[FreeTextEntry1] : 2022: 2 x hyperplastic polyps +  rectal biopsies showed colonic mucosa with mild cryptitis, focal crypt abscess and crypt atrophy.

## 2024-05-03 NOTE — ASSESSMENT
[FreeTextEntry1] : Ms. Duran is a 68 year old woman presenting for follow up of chronic diarrhea, previously diagnoses with microscopic colitis (lymphocytic type) currently managed with Budesonide which does not appear to be completing controlling her symptoms. No obvious trigger identified. She denied any alarming features. Previous Colonoscopy reviewed and biopsy results discussed at length. Postulate whether this could represent mild Ulcerative colitis given pathopneumonic findings on most recent biopsies. Endoscopically normal appearing mucosa and timeline suggest that if UC present its likely very mild in nature possibly transformation of prior lymphocytic colitis. However, given inadequate control of symptoms could consider transitioning from Budesonide to 5-ASA or other anti-inflammatory agent versus immunomodulators.   Will discuss case with IBD multi-disciplinary team regarding microscopic colitis versus mild ulcerative colitis. May consider alternative anti-inflammatory regimen, although likely will treat both conditions. Colon cancer screening interval to be determined.

## 2024-05-06 ENCOUNTER — APPOINTMENT (OUTPATIENT)
Dept: ULTRASOUND IMAGING | Facility: CLINIC | Age: 69
End: 2024-05-06

## 2024-05-06 ENCOUNTER — OUTPATIENT (OUTPATIENT)
Dept: OUTPATIENT SERVICES | Facility: HOSPITAL | Age: 69
LOS: 1 days | End: 2024-05-06
Payer: MEDICARE

## 2024-05-06 ENCOUNTER — APPOINTMENT (OUTPATIENT)
Dept: MAMMOGRAPHY | Facility: CLINIC | Age: 69
End: 2024-05-06

## 2024-05-06 DIAGNOSIS — Z12.31 ENCOUNTER FOR SCREENING MAMMOGRAM FOR MALIGNANT NEOPLASM OF BREAST: ICD-10-CM

## 2024-05-06 DIAGNOSIS — Z12.39 ENCOUNTER FOR OTHER SCREENING FOR MALIGNANT NEOPLASM OF BREAST: ICD-10-CM

## 2024-05-06 PROCEDURE — 77063 BREAST TOMOSYNTHESIS BI: CPT | Mod: 26

## 2024-05-06 PROCEDURE — 77067 SCR MAMMO BI INCL CAD: CPT

## 2024-05-06 PROCEDURE — 77067 SCR MAMMO BI INCL CAD: CPT | Mod: 26

## 2024-05-06 PROCEDURE — 77063 BREAST TOMOSYNTHESIS BI: CPT

## 2024-05-07 ENCOUNTER — APPOINTMENT (OUTPATIENT)
Dept: ORTHOPEDIC SURGERY | Facility: CLINIC | Age: 69
End: 2024-05-07
Payer: MEDICARE

## 2024-05-07 ENCOUNTER — APPOINTMENT (OUTPATIENT)
Dept: FAMILY MEDICINE | Facility: CLINIC | Age: 69
End: 2024-05-07
Payer: MEDICARE

## 2024-05-07 VITALS — SYSTOLIC BLOOD PRESSURE: 136 MMHG | DIASTOLIC BLOOD PRESSURE: 72 MMHG

## 2024-05-07 VITALS
OXYGEN SATURATION: 99 % | DIASTOLIC BLOOD PRESSURE: 82 MMHG | BODY MASS INDEX: 25 KG/M2 | HEART RATE: 79 BPM | HEIGHT: 59 IN | WEIGHT: 124 LBS | SYSTOLIC BLOOD PRESSURE: 148 MMHG

## 2024-05-07 VITALS — BODY MASS INDEX: 25 KG/M2 | WEIGHT: 124 LBS | HEIGHT: 59 IN

## 2024-05-07 DIAGNOSIS — F41.9 ANXIETY DISORDER, UNSPECIFIED: ICD-10-CM

## 2024-05-07 DIAGNOSIS — M75.111 INCOMPLETE ROTATOR CUFF TEAR OR RUPTURE OF RIGHT SHOULDER, NOT SPECIFIED AS TRAUMATIC: ICD-10-CM

## 2024-05-07 DIAGNOSIS — I10 ESSENTIAL (PRIMARY) HYPERTENSION: ICD-10-CM

## 2024-05-07 DIAGNOSIS — S42.255D NONDISPLACED FRACTURE OF GREATER TUBEROSITY OF LEFT HUMERUS, SUBSEQUENT ENCOUNTER FOR FRACTURE WITH ROUTINE HEALING: ICD-10-CM

## 2024-05-07 DIAGNOSIS — F32.A ANXIETY DISORDER, UNSPECIFIED: ICD-10-CM

## 2024-05-07 DIAGNOSIS — M75.32 CALCIFIC TENDINITIS OF LEFT SHOULDER: ICD-10-CM

## 2024-05-07 PROCEDURE — 99213 OFFICE O/P EST LOW 20 MIN: CPT

## 2024-05-07 PROCEDURE — 73030 X-RAY EXAM OF SHOULDER: CPT | Mod: LT

## 2024-05-07 RX ORDER — GABAPENTIN 100 MG/1
100 CAPSULE ORAL
Qty: 180 | Refills: 0 | Status: ACTIVE | COMMUNITY
Start: 1900-01-01 | End: 1900-01-01

## 2024-05-07 RX ORDER — AMLODIPINE BESYLATE 2.5 MG/1
2.5 TABLET ORAL
Qty: 90 | Refills: 0 | Status: ACTIVE | COMMUNITY

## 2024-05-07 RX ORDER — PAROXETINE HYDROCHLORIDE 40 MG/1
40 TABLET, FILM COATED ORAL
Qty: 90 | Refills: 0 | Status: ACTIVE | COMMUNITY
Start: 2017-08-21 | End: 1900-01-01

## 2024-05-07 NOTE — HISTORY OF PRESENT ILLNESS
[6] : 6 [0] : 0 [Retired] : Work status: retired [de-identified] : Here for f/u left shoulder. She went for PT in the past with good relief. Now having pain again. No new injury.  [de-identified] : hep

## 2024-05-07 NOTE — DISCUSSION/SUMMARY
[de-identified] : General Dx Discussion The patient was advised of the diagnosis. The natural history of the pathology was explained in full to the patient in layman's terms. All questions were answered. The risks and benefits of surgical and non-surgical treatment alternatives were explained in full to the patient.  Case Discussed. Will get an updated mri left shoulder for further evaluation of healing. Will also get an mri right shoulder for further evaluation of RCT. f/u after mri's.     Entered by Candelaria GODWIN acting as a scribe. Instructions: Dr. Smith- The documentation recorded by the scribe accurately reflects the service I personally performed and the decisions made by me.

## 2024-05-20 PROBLEM — F41.9 ANXIETY AND DEPRESSION: Status: ACTIVE | Noted: 2023-08-25

## 2024-05-20 PROBLEM — I10 BENIGN ESSENTIAL HYPERTENSION: Status: ACTIVE | Noted: 2023-10-16

## 2024-05-20 NOTE — PLAN
[FreeTextEntry1] : CONTINUE STRESS REDUCTION EXERCISES PRESCRIPTIONS RENEWED ROUTINELY GOOD SUPPORT SYSTEM THERAPY BLOOD PRESSURE CONTROLLED ON RECHECK - WILL MONITOR GI CONSULTANT NOTE FROM 5/1/24 AND UROLOGY FROM 5/2/24 APPRECIATED FOLLOW-UP ALL SPECIALISTS AS DIRECTED CALL WITH ANY QUESTIONS, CONCERNS OR CHANGES

## 2024-05-20 NOTE — HISTORY OF PRESENT ILLNESS
[FreeTextEntry1] : F/U ANXIETY, HYPERTENSION [de-identified] : MS. BEARD IS A PLEASANT 68 YO PRESENTING FOR FOLLOW-UP.  HISTORY OF HYPERTENSION.  STARTED ON AMLODIPINE 2.5 MG DAILY BY CARDIOLOGY DR. WOODSON.  ALSO SAW UROLOGY FOR RECURRENT UTI.  CONTINUED ON ESTROGEN.  SAW GI.  IS BEING TAPERED OFF BUDESONIDE.  ADDITIONAL TESTING ORDERED.  IS BEING TRANSITIONED TO LIALDA FOR SUPECTED MILD ULCERATIVE COLITIS.  LASTLY, SAW ORTHOPEDICS IN FOLLOW-UP OF LEFT SHOULDER.  TO HAVE MRI IMAGING.  TO HAVE THYROID US TOMORROW THROUGH ENDOCRINOLOGY DR. DEL TORO.  HISTORY OF ANXIETY AND DEPRESSION.  IS FEELING WELL ON PAROXETINE AND GABAPENTIN.

## 2024-06-03 ENCOUNTER — APPOINTMENT (OUTPATIENT)
Dept: DERMATOLOGY | Facility: CLINIC | Age: 69
End: 2024-06-03
Payer: MEDICARE

## 2024-06-03 PROCEDURE — 99212 OFFICE O/P EST SF 10 MIN: CPT | Mod: 25

## 2024-06-03 PROCEDURE — 11102 TANGNTL BX SKIN SINGLE LES: CPT

## 2024-06-05 ENCOUNTER — APPOINTMENT (OUTPATIENT)
Dept: MRI IMAGING | Facility: CLINIC | Age: 69
End: 2024-06-05
Payer: MEDICARE

## 2024-06-05 PROCEDURE — 73221 MRI JOINT UPR EXTREM W/O DYE: CPT | Mod: RT,MH

## 2024-06-05 PROCEDURE — 73221 MRI JOINT UPR EXTREM W/O DYE: CPT | Mod: LT,MH

## 2024-06-11 ENCOUNTER — APPOINTMENT (OUTPATIENT)
Dept: ORTHOPEDIC SURGERY | Facility: CLINIC | Age: 69
End: 2024-06-11
Payer: MEDICARE

## 2024-06-11 VITALS — BODY MASS INDEX: 25 KG/M2 | HEIGHT: 59 IN | WEIGHT: 124 LBS

## 2024-06-11 DIAGNOSIS — M75.112 INCOMPLETE ROTATOR CUFF TEAR OR RUPTURE OF LEFT SHOULDER, NOT SPECIFIED AS TRAUMATIC: ICD-10-CM

## 2024-06-11 DIAGNOSIS — M75.121 COMPLETE ROTATOR CUFF TEAR OR RUPTURE OF RIGHT SHOULDER, NOT SPECIFIED AS TRAUMATIC: ICD-10-CM

## 2024-06-11 DIAGNOSIS — M19.011 PRIMARY OSTEOARTHRITIS, RIGHT SHOULDER: ICD-10-CM

## 2024-06-11 PROCEDURE — 99214 OFFICE O/P EST MOD 30 MIN: CPT

## 2024-06-11 NOTE — DISCUSSION/SUMMARY
[de-identified] : General Dx Discussion The patient was advised of the diagnosis. The natural history of the pathology was explained in full to the patient in layman's terms. All questions were answered. The risks and benefits of surgical and non-surgical treatment alternatives were explained in full to the patient.  Case Discussed. MRIs reviewed, surg/non surg options discussed,  advised of RCR/ TSR  she does not want to have surgery at this time  f/u prn  will cont HEP

## 2024-06-11 NOTE — DATA REVIEWED
[Left] : left [MRI] : MRI [Shoulder] : shoulder [Report was reviewed and noted in the chart] : The report was reviewed and noted in the chart [FreeTextEntry1] : no significant change regarding irregular bone lesion in the greater tuberosityhumeral head laterally over an area measuring 3cm, reactive marrow edeam. new moderate partial tearing and delamination of the teres minor tendon insertion extending towards the myotendinous complex wtih otherwise similar appearing severe partial tearing of the infraspinatous  tendon insertion. moderate new partial tearing and subluxation of the biceps tendon.  [FreeTextEntry2] : findings supicous for an inflammatory arthropathy in addition to possible rotator cuff arthropathy with full thickness disruption, retraction or superior and central subscapularis tendon fibers. severe GH oa.

## 2024-06-11 NOTE — PHYSICAL EXAM
[Left] : left shoulder [Right] : right shoulder [NL (0-70)] : full internal rotation 0-70 degrees [5 ___] : forward flexion 5[unfilled]/5 [5___] : abduction 5[unfilled]/5 [] : negative drop-arm test [TWNoteComboBox6] : internal rotation 70 degrees [TWNoteComboBox7] : active forward flexion 170 degrees [de-identified] : active abduction 145 degrees [de-identified] : external rotation 80 degrees

## 2024-06-11 NOTE — HISTORY OF PRESENT ILLNESS
[Radiating] : radiating [Retired] : Work status: retired [] : no [FreeTextEntry1] : shoulders [FreeTextEntry5] : left shoulder pain is a level of 4 and rt shoulder pain level is an 8 [FreeTextEntry7] : occ rt elbow radiating pain [FreeTextEntry9] : HEP after pain, Aleve/ES Tylenol [de-identified] : lifting above waist [de-identified] : MRIs [facility]

## 2024-06-13 ENCOUNTER — APPOINTMENT (OUTPATIENT)
Dept: FAMILY MEDICINE | Facility: CLINIC | Age: 69
End: 2024-06-13

## 2024-06-17 ENCOUNTER — TRANSCRIPTION ENCOUNTER (OUTPATIENT)
Age: 69
End: 2024-06-17

## 2024-06-18 ENCOUNTER — APPOINTMENT (OUTPATIENT)
Dept: UROLOGY | Facility: CLINIC | Age: 69
End: 2024-06-18

## 2024-06-18 ENCOUNTER — OUTPATIENT (OUTPATIENT)
Dept: OUTPATIENT SERVICES | Facility: HOSPITAL | Age: 69
LOS: 1 days | End: 2024-06-18
Payer: MEDICARE

## 2024-06-18 DIAGNOSIS — M16.10 UNILATERAL PRIMARY OSTEOARTHRITIS, UNSPECIFIED HIP: ICD-10-CM

## 2024-06-18 DIAGNOSIS — Z98.891 HISTORY OF UTERINE SCAR FROM PREVIOUS SURGERY: Chronic | ICD-10-CM

## 2024-06-18 DIAGNOSIS — K37 UNSPECIFIED APPENDICITIS: Chronic | ICD-10-CM

## 2024-06-18 PROCEDURE — 76000 FLUOROSCOPY <1 HR PHYS/QHP: CPT

## 2024-06-18 PROCEDURE — 0441T ABLTJ PERC LXTR/PERPH NRV: CPT | Mod: CG

## 2024-06-20 ENCOUNTER — APPOINTMENT (OUTPATIENT)
Dept: UROLOGY | Facility: CLINIC | Age: 69
End: 2024-06-20
Payer: MEDICARE

## 2024-06-20 VITALS
HEART RATE: 94 BPM | BODY MASS INDEX: 24.6 KG/M2 | WEIGHT: 122 LBS | HEIGHT: 59 IN | SYSTOLIC BLOOD PRESSURE: 120 MMHG | DIASTOLIC BLOOD PRESSURE: 74 MMHG

## 2024-06-20 DIAGNOSIS — N39.0 URINARY TRACT INFECTION, SITE NOT SPECIFIED: ICD-10-CM

## 2024-06-20 PROCEDURE — 99212 OFFICE O/P EST SF 10 MIN: CPT

## 2024-06-20 NOTE — LETTER BODY
[Dear  ___] : Dear  [unfilled], [Courtesy Letter:] : I had the pleasure of seeing your patient, [unfilled], in my office today. [Please see my note below.] : Please see my note below. [Sincerely,] : Sincerely, [FreeTextEntry3] : Ed  Amando Bryson MD [facility]  [facility] [place] [facility] [employer] [facility]

## 2024-06-20 NOTE — ASSESSMENT
[FreeTextEntry1] : Impression:  recurrent UTi  plan;  continue vaginal estrogen she is moving to [place] and will follow up PRN

## 2024-06-20 NOTE — PHYSICAL EXAM
[General Appearance - Well Developed] : well developed [] : no respiratory distress [Normal Station and Gait] : the gait and station were normal for the patient's age [Skin Color & Pigmentation] : normal skin color and pigmentation [No Focal Deficits] : no focal deficits [Oriented To Time, Place, And Person] : oriented to person, place, and time

## 2024-06-26 ENCOUNTER — TRANSCRIPTION ENCOUNTER (OUTPATIENT)
Age: 69
End: 2024-06-26

## 2024-07-08 ENCOUNTER — APPOINTMENT (OUTPATIENT)
Dept: FAMILY MEDICINE | Facility: CLINIC | Age: 69
End: 2024-07-08
Payer: MEDICARE

## 2024-07-08 ENCOUNTER — LABORATORY RESULT (OUTPATIENT)
Age: 69
End: 2024-07-08

## 2024-07-08 VITALS
BODY MASS INDEX: 24.39 KG/M2 | SYSTOLIC BLOOD PRESSURE: 162 MMHG | WEIGHT: 121 LBS | DIASTOLIC BLOOD PRESSURE: 92 MMHG | HEART RATE: 67 BPM | HEIGHT: 59 IN | OXYGEN SATURATION: 96 %

## 2024-07-08 VITALS — DIASTOLIC BLOOD PRESSURE: 90 MMHG | SYSTOLIC BLOOD PRESSURE: 148 MMHG

## 2024-07-08 DIAGNOSIS — N64.4 MASTODYNIA: ICD-10-CM

## 2024-07-08 DIAGNOSIS — Z92.29 PERSONAL HISTORY OF OTHER DRUG THERAPY: ICD-10-CM

## 2024-07-08 DIAGNOSIS — R73.09 OTHER ABNORMAL GLUCOSE: ICD-10-CM

## 2024-07-08 DIAGNOSIS — G56.02 CARPAL TUNNEL SYNDROME, LEFT UPPER LIMB: ICD-10-CM

## 2024-07-08 DIAGNOSIS — R73.01 IMPAIRED FASTING GLUCOSE: ICD-10-CM

## 2024-07-08 DIAGNOSIS — S52.532A COLLES' FRACTURE OF LEFT RADIUS, INITIAL ENCOUNTER FOR CLOSED FRACTURE: ICD-10-CM

## 2024-07-08 DIAGNOSIS — Z00.00 ENCOUNTER FOR GENERAL ADULT MEDICAL EXAMINATION W/OUT ABNORMAL FINDINGS: ICD-10-CM

## 2024-07-08 DIAGNOSIS — R79.89 OTHER SPECIFIED ABNORMAL FINDINGS OF BLOOD CHEMISTRY: ICD-10-CM

## 2024-07-08 DIAGNOSIS — F43.9 REACTION TO SEVERE STRESS, UNSPECIFIED: ICD-10-CM

## 2024-07-08 DIAGNOSIS — R29.898 OTHER SYMPTOMS AND SIGNS INVOLVING THE MUSCULOSKELETAL SYSTEM: ICD-10-CM

## 2024-07-08 DIAGNOSIS — G47.33 OBSTRUCTIVE SLEEP APNEA (ADULT) (PEDIATRIC): ICD-10-CM

## 2024-07-08 DIAGNOSIS — R82.90 UNSPECIFIED ABNORMAL FINDINGS IN URINE: ICD-10-CM

## 2024-07-08 DIAGNOSIS — Z01.818 ENCOUNTER FOR OTHER PREPROCEDURAL EXAMINATION: ICD-10-CM

## 2024-07-08 DIAGNOSIS — R20.2 PARESTHESIA OF SKIN: ICD-10-CM

## 2024-07-08 DIAGNOSIS — Z87.898 PERSONAL HISTORY OF OTHER SPECIFIED CONDITIONS: ICD-10-CM

## 2024-07-08 DIAGNOSIS — K14.9 DISEASE OF TONGUE, UNSPECIFIED: ICD-10-CM
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
## 2024-07-08 PROCEDURE — G0439: CPT

## 2024-07-08 PROCEDURE — 36415 COLL VENOUS BLD VENIPUNCTURE: CPT

## 2024-07-08 RX ORDER — MULTIVITAMIN
CAPSULE ORAL
Refills: 0 | Status: ACTIVE | COMMUNITY

## 2024-07-08 RX ORDER — UBIDECARENONE/VIT E ACET 100MG-5
CAPSULE ORAL
Refills: 0 | Status: ACTIVE | COMMUNITY

## 2024-07-09 LAB
25(OH)D3 SERPL-MCNC: 61.8 NG/ML
ALBUMIN SERPL ELPH-MCNC: 4.6 G/DL
ALP BLD-CCNC: 91 U/L
ALT SERPL-CCNC: 27 U/L
ANION GAP SERPL CALC-SCNC: 15 MMOL/L
AST SERPL-CCNC: 33 U/L
BILIRUB SERPL-MCNC: 0.4 MG/DL
BUN SERPL-MCNC: 13 MG/DL
CALCIUM SERPL-MCNC: 9.4 MG/DL
CHLORIDE SERPL-SCNC: 101 MMOL/L
CHOLEST SERPL-MCNC: 222 MG/DL
CO2 SERPL-SCNC: 21 MMOL/L
EGFR: 100 ML/MIN/1.73M2
ESTIMATED AVERAGE GLUCOSE: 114 MG/DL
GLUCOSE SERPL-MCNC: 100 MG/DL
HBA1C MFR BLD HPLC: 5.6 %
HDLC SERPL-MCNC: 97 MG/DL
LDLC SERPL CALC-MCNC: 99 MG/DL
NONHDLC SERPL-MCNC: 125 MG/DL
POTASSIUM SERPL-SCNC: 4.4 MMOL/L
PROT SERPL-MCNC: 6.8 G/DL
SODIUM SERPL-SCNC: 137 MMOL/L
T4 FREE SERPL-MCNC: 0.9 NG/DL
TRIGL SERPL-MCNC: 160 MG/DL
TSH SERPL-ACNC: 2.35 UIU/ML

## 2024-07-09 NOTE — ED PROVIDER NOTE - NPI NUMBER (FOR SYSADMIN USE ONLY) :
----- Message from Brendon Boston LPN sent at 7/9/2024 10:21 AM CDT -----  Any updates on PA?  ----- Message -----  From: Addie Henrandez MD  Sent: 7/9/2024   9:29 AM CDT  To: Brendon Boston LPN    Updates?  ----- Message -----  From: Brendon Boston LPN  Sent: 7/1/2024   3:07 PM CDT  To: Addie Hernandez MD    Revatio was approved & Opsumit Tab 10mg is denied for not meeting the prior authorization requirement(s). Medication  authorization requires the following:  (1) One of the following:  (A) Diagnosis of pulmonary arterial hypertension confirmed by right heart catheterization.  (B) You are currently on any therapy for the diagnosis of pulmonary arterial hypertension.  ----- Message -----  From: Addie Hernandez MD  Sent: 7/1/2024   7:57 AM CDT  To: Brendon Boston LPN    Can I get an update on this. Peer to peer requested or was paperwork sent for PA?  ----- Message -----  From: Brendon Boston LPN  Sent: 5/9/2024   3:43 PM CDT  To: Addie Hernandez MD    Spoke to the patient voiced both rx's were denied. Revatio & Letairis. Patient want to know will you send in something else?  ----- Message -----  From: Xenia Moore  Sent: 5/9/2024   1:36 PM CDT  To: Mary Real Staff    Patient called needing to speak to a nurse about some concerns he has on a prescription. He needs to speak to someone about what's going on, and a good phone number is [phone]  
PA APPROVED AS OF JULY 5TH 2024 BY [employer]..   CASE NUMBER [id number]  
[[id number]]

## 2024-07-10 LAB
BASOPHILS # BLD AUTO: 0.09 K/UL
BASOPHILS NFR BLD AUTO: 1.1 %
EOSINOPHIL # BLD AUTO: 0.23 K/UL
EOSINOPHIL NFR BLD AUTO: 2.8 %
HCT VFR BLD CALC: 41 %
HGB BLD-MCNC: 12.3 G/DL
IMM GRANULOCYTES NFR BLD AUTO: 0.2 %
LYMPHOCYTES # BLD AUTO: 2.46 K/UL
LYMPHOCYTES NFR BLD AUTO: 30.2 %
MAN DIFF?: NORMAL
MCHC RBC-ENTMCNC: 30 GM/DL
MCHC RBC-ENTMCNC: 32.6 PG
MCV RBC AUTO: 108.8 FL
MONOCYTES # BLD AUTO: 0.65 K/UL
MONOCYTES NFR BLD AUTO: 8 %
NEUTROPHILS # BLD AUTO: 4.69 K/UL
NEUTROPHILS NFR BLD AUTO: 57.7 %
PLATELET # BLD AUTO: 369 K/UL
RBC # BLD: 3.77 M/UL
RBC # FLD: 16.5 %
WBC # FLD AUTO: 8.14 K/UL

## 2024-07-17 ENCOUNTER — APPOINTMENT (OUTPATIENT)
Dept: DERMATOLOGY | Facility: CLINIC | Age: 69
End: 2024-07-17
Payer: MEDICARE

## 2024-07-17 PROCEDURE — 99213 OFFICE O/P EST LOW 20 MIN: CPT | Mod: 25

## 2024-07-17 PROCEDURE — 17262 DSTRJ MAL LES T/A/L 1.1-2.0: CPT

## 2024-07-18 ENCOUNTER — APPOINTMENT (OUTPATIENT)
Dept: FAMILY MEDICINE | Facility: CLINIC | Age: 69
End: 2024-07-18
Payer: MEDICARE

## 2024-07-18 VITALS
SYSTOLIC BLOOD PRESSURE: 140 MMHG | OXYGEN SATURATION: 95 % | WEIGHT: 121 LBS | BODY MASS INDEX: 24.39 KG/M2 | DIASTOLIC BLOOD PRESSURE: 78 MMHG | HEIGHT: 59 IN | HEART RATE: 86 BPM

## 2024-07-18 VITALS — DIASTOLIC BLOOD PRESSURE: 70 MMHG | SYSTOLIC BLOOD PRESSURE: 122 MMHG

## 2024-07-18 DIAGNOSIS — R71.8 OTHER ABNORMALITY OF RED BLOOD CELLS: ICD-10-CM

## 2024-07-18 DIAGNOSIS — I10 ESSENTIAL (PRIMARY) HYPERTENSION: ICD-10-CM

## 2024-07-18 DIAGNOSIS — E78.00 PURE HYPERCHOLESTEROLEMIA, UNSPECIFIED: ICD-10-CM

## 2024-07-18 PROCEDURE — 99213 OFFICE O/P EST LOW 20 MIN: CPT

## 2024-07-21 PROBLEM — R71.8 ELEVATED MCV: Status: ACTIVE | Noted: 2024-07-18

## 2024-07-21 PROBLEM — E78.00 HYPERCHOLESTEROLEMIA: Status: ACTIVE | Noted: 2024-07-21

## 2024-08-02 ENCOUNTER — APPOINTMENT (OUTPATIENT)
Dept: GASTROENTEROLOGY | Facility: CLINIC | Age: 69
End: 2024-08-02
Payer: MEDICARE

## 2024-08-02 VITALS
OXYGEN SATURATION: 97 % | SYSTOLIC BLOOD PRESSURE: 123 MMHG | RESPIRATION RATE: 14 BRPM | HEART RATE: 88 BPM | BODY MASS INDEX: 24.39 KG/M2 | TEMPERATURE: 97.6 F | DIASTOLIC BLOOD PRESSURE: 83 MMHG | HEIGHT: 59 IN | WEIGHT: 121 LBS

## 2024-08-02 DIAGNOSIS — K52.832 LYMPHOCYTIC COLITIS: ICD-10-CM

## 2024-08-02 PROCEDURE — 99213 OFFICE O/P EST LOW 20 MIN: CPT

## 2024-08-02 NOTE — ASSESSMENT
[FreeTextEntry1] : Ms. Duran is a 69-year-old woman presenting for follow up of chronic diarrhea, previously diagnoses with microscopic colitis (lymphocytic type) previously managed with Budesonide which did not appear to be completing controlling her symptoms. No obvious trigger identified. She denied any alarming features. Previous Colonoscopy reviewed and biopsy results discussed at length. Postulate whether this could represent mild Ulcerative colitis given pathopneumonic findings on most recent rectal biopsies. Endoscopically normal appearing mucosa and timeline suggest that if UC present its likely very mild in nature possibly transformation of prior lymphocytic colitis.  Given resolution of symptoms and absence of therapy we will continue to monitor for now.  If patient were to develop symptoms would send fecal calprotectin and initiate mesalamine (Lialda) given suspected mild ulcerative colitis which may be a conversion from lymphocytic colitis seen previously.  May also consider repeat colonoscopy/flexible sigmoidoscopy with biopsies, interval to be determined.

## 2024-08-02 NOTE — HISTORY OF PRESENT ILLNESS
[FreeTextEntry1] : Ms. Duran is a 69-year-old woman presenting for follow up. She has a significant history of microscopic colitis (Lymphocytic type) for which she was on Budesonide for several years.  She has since discontinued budesonide via abrupt tapering with no recurrence of symptoms.  After last visit planning discontinuation of budesonide and initiation of mesalamine therapy however patient was planning trip to [facility] and did not want to alter medication regimen therefore deferred, recently returned from trip to [place] for which she reports she has been off of budesonide and has altered her diet (diminished leafy green vegetables) which has resulted in near resolution of symptoms despite absence of budesonide.  Patient without acute concern in office today and reports feeling at her baseline level of health.  Historically:  She underwent initial colonoscopy for screening purposes in 2014 which was normal (no biopsies performed). She then reports development of chronic diarrhea (non-bloody), then in 2017 she had repeat Colonoscopy for which pan biopsies performed, at that time it was noted that she had diffuse mild non-specific inflammation (intraepithelial lymphocytes) without evidence of dysplasia / granuloma / active colitis or chronic changes. It was felt given the findings and presentation this most likely represented microscopic colitis (lymphocytic colitis). She was started on Budesonide. She endorsed some improvement in symptoms however from her description it would appear more likely she accommodated to a new norm. She continued to report interference with daily living, having multiple bowel movements daily, loose stools with mucus (non-bloody). Given symptom persistence she again underwent Colonoscopy with biopsies in 2022 which was remarkable for diffuse intramucosal lymphoid aggregates in the ascending / transverse colon. The left colon biopsies were normal. Interestingly the rectal biopsies showed colonic mucosa with mild cryptitis, focal crypt abscess and crypt atrophy. Endoscopically the mucosa appeared normal throughout the entire colon.  Summary:  Colonoscopy: 2022: 2 x hyperplastic polyps + rectal biopsies showed colonic mucosa with mild cryptitis, focal crypt abscess and crypt atrophy.

## 2024-08-02 NOTE — PHYSICAL EXAM
[Alert] : alert [Normal Voice/Communication] : normal voice/communication [Healthy Appearing] : healthy appearing [No Acute Distress] : no acute distress [Sclera] : the sclera and conjunctiva were normal [Hearing Threshold Finger Rub Not Sargent] : hearing was normal [Oropharynx] : the oropharynx was normal [Normal Appearance] : the appearance of the neck was normal [No Respiratory Distress] : no respiratory distress [No Acc Muscle Use] : no accessory muscle use [Respiration, Rhythm And Depth] : normal respiratory rhythm and effort [Heart Rate And Rhythm] : heart rate was normal and rhythm regular [None] : no edema [Abdomen Tenderness] : non-tender [No Masses] : no abdominal mass palpated [Abdomen Soft] : soft [] : no hepatosplenomegaly [Oriented To Time, Place, And Person] : oriented to person, place, and time

## 2024-08-02 NOTE — PLAN
[TextEntry] : Continue to monitor given resolution of symptoms. Continue to hold budesonide now that patient has successfully tapered off. If symptoms recur would send fecal calprotectin and initiate mesalamine therapy as previously discussed. Colonoscopy surveillance to be determined.  Follow-up in 6 months to discuss further.

## 2024-08-20 ENCOUNTER — NON-APPOINTMENT (OUTPATIENT)
Age: 69
End: 2024-08-20

## 2024-08-21 ENCOUNTER — TRANSCRIPTION ENCOUNTER (OUTPATIENT)
Age: 69
End: 2024-08-21

## 2024-08-21 RX ORDER — MESALAMINE 1.2 G/1
1.2 TABLET, DELAYED RELEASE ORAL DAILY
Qty: 120 | Refills: 3 | Status: ACTIVE | COMMUNITY
Start: 2024-08-21 | End: 1900-01-01

## 2024-08-22 ENCOUNTER — APPOINTMENT (OUTPATIENT)
Dept: DERMATOLOGY | Facility: CLINIC | Age: 69
End: 2024-08-22
Payer: MEDICARE

## 2024-08-22 ENCOUNTER — NON-APPOINTMENT (OUTPATIENT)
Age: 69
End: 2024-08-22

## 2024-08-22 DIAGNOSIS — C44.319 BASAL CELL CARCINOMA OF SKIN OF OTHER PARTS OF FACE: ICD-10-CM

## 2024-08-22 PROCEDURE — 17312 MOHS ADDL STAGE: CPT

## 2024-08-22 PROCEDURE — 13132 CMPLX RPR F/C/C/M/N/AX/G/H/F: CPT

## 2024-08-22 PROCEDURE — 17311 MOHS 1 STAGE H/N/HF/G: CPT

## 2024-08-22 NOTE — HISTORY OF PRESENT ILLNESS
[FreeTextEntry1] : Mohs surgery for BCC, infiltrative of left medial malar cheek - possibly recurrent [de-identified] : 08/22/2024   Referred by: Dr. Block   We had the pleasure of seeing your patient for Mohs Micrographic Surgery. Ms. 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 Education needs were identified. There were no barriers to learning.

## 2024-08-22 NOTE — PHYSICAL EXAM
[Alert] : alert [Oriented x 3] : ~L oriented x 3 [Well Nourished] : well nourished [Conjunctiva Non-injected] : conjunctiva non-injected [No Visual Lymphadenopathy] : no visual  lymphadenopathy [No Clubbing] : no clubbing [No Edema] : no edema [No Bromhidrosis] : no bromhidrosis [No Chromhidrosis] : no chromhidrosis [FreeTextEntry3] : Left medial cheek - at least 0.8 cm pink plaque and adjacent nearby superomedial hypopigmented patch

## 2024-08-26 ENCOUNTER — TRANSCRIPTION ENCOUNTER (OUTPATIENT)
Age: 69
End: 2024-08-26

## 2024-08-27 LAB — CALPROTECTIN FECAL: <5 UG/G

## 2024-08-30 ENCOUNTER — RX RENEWAL (OUTPATIENT)
Age: 69
End: 2024-08-30

## 2024-08-30 ENCOUNTER — APPOINTMENT (OUTPATIENT)
Dept: GASTROENTEROLOGY | Facility: CLINIC | Age: 69
End: 2024-08-30
Payer: MEDICARE

## 2024-08-30 VITALS
WEIGHT: 119 LBS | SYSTOLIC BLOOD PRESSURE: 130 MMHG | DIASTOLIC BLOOD PRESSURE: 78 MMHG | HEART RATE: 73 BPM | BODY MASS INDEX: 23.99 KG/M2 | HEIGHT: 59 IN | RESPIRATION RATE: 14 BRPM | OXYGEN SATURATION: 98 %

## 2024-08-30 DIAGNOSIS — K52.832 LYMPHOCYTIC COLITIS: ICD-10-CM

## 2024-08-30 DIAGNOSIS — R19.7 DIARRHEA, UNSPECIFIED: ICD-10-CM

## 2024-08-30 PROCEDURE — 99214 OFFICE O/P EST MOD 30 MIN: CPT

## 2024-08-30 NOTE — PHYSICAL EXAM
[Alert] : alert [Normal Voice/Communication] : normal voice/communication [Healthy Appearing] : healthy appearing [No Acute Distress] : no acute distress [Sclera] : the sclera and conjunctiva were normal [Hearing Threshold Finger Rub Not Wheeler] : hearing was normal [Oropharynx] : the oropharynx was normal [Normal Appearance] : the appearance of the neck was normal [No Respiratory Distress] : no respiratory distress [No Acc Muscle Use] : no accessory muscle use [Respiration, Rhythm And Depth] : normal respiratory rhythm and effort [Heart Rate And Rhythm] : heart rate was normal and rhythm regular [None] : no edema [Abdomen Tenderness] : non-tender [No Masses] : no abdominal mass palpated [Abdomen Soft] : soft [] : no hepatosplenomegaly [Oriented To Time, Place, And Person] : oriented to person, place, and time

## 2024-08-30 NOTE — PLAN
[TextEntry] : Sent infectious studies to rule out alternative cause of acutely worsening diarrhea.  If infectious studies negative can resume anti-diarrheal.  Continue to hold budesonide now that patient has successfully tapered off. Would start Mesalamine - may take weeks to be effective (given evidence of inflammation on prior biopsies).  Transitioning to [place], will help establish care with provider to continue work up.  Colonoscopy surveillance to be determined (likely in [place]).  Follow-up in 1 month to discuss further and aid with above.  Will also discuss with IBD specialist again regarding recent changes.

## 2024-08-30 NOTE — ASSESSMENT
[FreeTextEntry1] : Ms. Duran is a 69-year-old woman presenting for follow up of chronic diarrhea, previously diagnoses with microscopic colitis (lymphocytic type) previously managed with Budesonide which did not appear to be completing controlling her symptoms. No obvious trigger identified. Previous Colonoscopy reviewed and biopsy results discussed at length. Postulate whether this could represent mild Ulcerative colitis given pathopneumonic findings on rectal biopsies. Endoscopically normal appearing mucosa and timeline suggest that if UC present its likely very mild in nature possibly transformation of prior lymphocytic colitis. Fecal calprotectin not elevated. Attempted to initiate mesalamine (Lialda) given suspected mild ulcerative colitis however patient discontinued use after only 5 days. Will re-start and trial for 8 weeks, may consider repeat colonoscopy/flexible sigmoidoscopy with biopsies, interval to be determined given patient plans to move to [place]. Will send stool studies to rule out infectious etiologies. Pending above will determine next step in management.

## 2024-08-30 NOTE — HISTORY OF PRESENT ILLNESS
[FreeTextEntry1] : Ms. Duran is a 69-year-old woman who presents for follow up. She has a significant history of microscopic colitis (Lymphocytic type) for which she was on Budesonide for several years. She has since discontinued budesonide via abrupt tapering with no recurrence of symptoms. More recently symptoms have recurred for which she was started on Mesalamine (given previous biopsy findings, although Fecal calprotectin negative) however only took 4-5 days worth before discontinuing therapy (also only began once symptoms started not prior), since last week reporting marked symptoms characterized by upwards of 10 loose bowel movements per day. Requested stools studies (pending). Symptoms improved with anti-diarrheal medications (over the counter). Patient also under a lot of stress, selling their home and moving to [place] permanently which is likely adding to her anxiety.   Historically: She underwent initial colonoscopy for screening purposes in 2014 which was normal (no biopsies performed). She then reports development of chronic diarrhea (non-bloody), then in 2017 she had repeat Colonoscopy for which pan biopsies performed, at that time it was noted that she had diffuse mild non-specific inflammation (intraepithelial lymphocytes) without evidence of dysplasia / granuloma / active colitis or chronic changes. It was felt given the findings and presentation this most likely represented microscopic colitis (lymphocytic colitis). She was started on Budesonide. She endorsed some improvement in symptoms however from her description it would appear more likely she accommodated to a new norm. She continued to report interference with daily living, having multiple bowel movements daily, loose stools with mucus (non-bloody). Given symptom persistence she again underwent Colonoscopy with biopsies in 2022 which was remarkable for diffuse intramucosal lymphoid aggregates in the ascending / transverse colon. The left colon biopsies were normal. Interestingly the rectal biopsies showed colonic mucosa with mild cryptitis, focal crypt abscess and crypt atrophy. Endoscopically the mucosa appeared normal throughout the entire colon.  Summary: Colonoscopy: 2022: 2 x hyperplastic polyps + rectal biopsies showed colonic mucosa with mild cryptitis, focal crypt abscess and crypt atrophy.

## 2024-09-05 ENCOUNTER — TRANSCRIPTION ENCOUNTER (OUTPATIENT)
Age: 69
End: 2024-09-05

## 2024-09-05 LAB — GI PCR PANEL: NOT DETECTED

## 2024-09-06 ENCOUNTER — NON-APPOINTMENT (OUTPATIENT)
Age: 69
End: 2024-09-06

## 2024-09-12 ENCOUNTER — APPOINTMENT (OUTPATIENT)
Dept: DERMATOLOGY | Facility: CLINIC | Age: 69
End: 2024-09-12
Payer: MEDICARE

## 2024-09-12 DIAGNOSIS — C44.319 BASAL CELL CARCINOMA OF SKIN OF OTHER PARTS OF FACE: ICD-10-CM

## 2024-09-12 PROCEDURE — 99212 OFFICE O/P EST SF 10 MIN: CPT

## 2024-09-16 ENCOUNTER — TRANSCRIPTION ENCOUNTER (OUTPATIENT)
Age: 69
End: 2024-09-16

## 2024-09-19 ENCOUNTER — APPOINTMENT (OUTPATIENT)
Dept: GASTROENTEROLOGY | Facility: CLINIC | Age: 69
End: 2024-09-19
Payer: MEDICARE

## 2024-09-19 VITALS
SYSTOLIC BLOOD PRESSURE: 115 MMHG | OXYGEN SATURATION: 96 % | RESPIRATION RATE: 15 BRPM | DIASTOLIC BLOOD PRESSURE: 73 MMHG | TEMPERATURE: 98.1 F | HEIGHT: 59 IN | WEIGHT: 115 LBS | HEART RATE: 78 BPM | BODY MASS INDEX: 23.18 KG/M2

## 2024-09-19 DIAGNOSIS — R19.7 DIARRHEA, UNSPECIFIED: ICD-10-CM

## 2024-09-19 DIAGNOSIS — K52.832 LYMPHOCYTIC COLITIS: ICD-10-CM

## 2024-09-19 PROCEDURE — 99214 OFFICE O/P EST MOD 30 MIN: CPT

## 2024-09-19 NOTE — HISTORY OF PRESENT ILLNESS
[FreeTextEntry1] : 69 F microscopic colitis (lymphocytic colitis) on budesonide. Budesonide since 2017 Flare up months off budesonide due to lack of prior auth 2 weeks in [place] without it - miserable ASA --did nothing, 3 weeks Ed got her budesonide--diarrhea slowed down quite a bit--on this since Mon used to be v watery, full of mucus clears for 4 days - didn't help dehydrated - ER-  1 week ago--[facility] v weak, couldn't think clearly, could barely ambulate around house 10 lb weight loss 118---105---now close to normal 115 lots of mucus no blood fhx colon cancer - no carbs  bm/d - normal 3 in am occ 1 more in pm; now is different - 2 times a day, but brownie batter   August 2024 calpro <5 GI PCR -ve CBC Hb 12.3 .8 WBC 8.14 plts 369 TFTs nl CMP nl vit D 61.8  9/22 Colon: [place] Impressions:    Polyps (3 mm) in the rectum and sigmoid colon. (Polypectomy, Biopsy).    Normal mucosa in the cecum, ascending colon, transverse colon and descending  colon. (Biopsy). Final Diagnosis  1  Biopsy ascending colon: - Colonic mucosa with intramucosal lymphoid aggregates, negative for inflammation. 2  Biopsy transverse colon: - Colonic mucosa with intramucosal lymphoid aggregates, negative for inflammation. 3  Biopsy descending colon: - Colonic mucosa with no histopathological abnormalities. 4  Sigmoid polyp: - Hyperplastic polyp. 5  Rectal polyp: - Hyperplastic polyp. 6  Rectal, biopsy: - Colonic mucosa with mild cryptitis, focal crypt abscess and crypt atrophy.  Colon 2017 [place]: 1. Colon, cecum , biopsy - Mild non specific chronic inflammation with increased intraepithelial lymphocytes (see note) - Negative for acute active colitis, granuloma, chronicity changes, or dysplasia  2. Colon, ascending, biopsy - Mild non specific chronic inflammation with increased intraepithelial lymphocytes - Negative for acute active colitis, granuloma, chronicity changes, or dysplasia  3. Colon, descending , biopsy - Mild non specific chronic inflammation with increased intraepithelial lymphocytes - Negative for acute active colitis, granuloma, chronicity changes, or dysplasia  4. Colon, sigmoid, biopsy - Mild non specific chronic inflammation with increased intraepithelial lymphocytes - Negative for acute active colitis, granuloma, chronicity changes, or dysplasia  5. Colon, rectum , biopsy - Mild non specific chronic inflammation with increased intraepithelial lymphocytes - Negative for acute active colitis, granuloma, chronicity changes, or dysplasia

## 2024-09-19 NOTE — ASSESSMENT
[FreeTextEntry1] : 69 F microscopic colitis (lymphocytic colitis) on budesonide since 2017.Prior auth needed, office didn't understand, sudden med lapse--flare. recently restarted on budesonide thanks to SOULEYMANE Elizabeth, better already - weight regain, more formed stools. Don't think this is UC proctitis, no response to ASA and also immediate response to budesonide. Found path 2017 ++lymphocytes at all segments bx.

## 2024-09-19 NOTE — PLAN
[TextEntry] : 1. 6 week budesonide, then we'll try to taper 2. f/u s/p re: next steps 3. portal f/u if going to [place] for winter

## 2024-09-19 NOTE — HISTORY OF PRESENT ILLNESS
[FreeTextEntry1] : 69 F microscopic colitis (lymphocytic colitis) on budesonide. Budesonide since 2017 Flare up months off budesonide due to lack of prior auth 2 weeks in [place] without it - miserable ASA --did nothing, 3 weeks Ed got her budesonide--diarrhea slowed down quite a bit--on this since Mon used to be v watery, full of mucus clears for 4 days - didn't help dehydrated - ER-  1 week ago--[facility] v weak, couldn't think clearly, could barely ambulate around house 10 lb weight loss 118---105---now close to normal 115 lots of mucus no blood fhx colon cancer - no carbs  bm/d - normal 3 in am occ 1 more in pm; now is different - 2 times a day, but brownie batter   August 2024 calpro <5 GI PCR -ve CBC Hb 12.3 .8 WBC 8.14 plts 369 TFTs nl CMP nl vit D 61.8  9/22 Colon: Cu[place]em[place] Impressions:    Polyps (3 mm) in the rectum and sigmoid colon. (Polypectomy, Biopsy).    Normal mucosa in the cecum, ascending colon, transverse colon and descending  colon. (Biopsy). Final Diagnosis  1  Biopsy ascending colon: - Colonic mucosa with intramucosal lymphoid aggregates, negative for inflammation. 2  Biopsy transverse colon: - Colonic mucosa with intramucosal lymphoid aggregates, negative for inflammation. 3  Biopsy descending colon: - Colonic mucosa with no histopathological abnormalities. 4  Sigmoid polyp: - Hyperplastic polyp. 5  Rectal polyp: - Hyperplastic polyp. 6  Rectal, biopsy: - Colonic mucosa with mild cryptitis, focal crypt abscess and crypt atrophy.  Colon 2017 [place]: 1. Colon, cecum , biopsy - Mild non specific chronic inflammation with increased intraepithelial lymphocytes (see note) - Negative for acute active colitis, granuloma, chronicity changes, or dysplasia  2. Colon, ascending, biopsy - Mild non specific chronic inflammation with increased intraepithelial lymphocytes - Negative for acute active colitis, granuloma, chronicity changes, or dysplasia  3. Colon, descending , biopsy - Mild non specific chronic inflammation with increased intraepithelial lymphocytes - Negative for acute active colitis, granuloma, chronicity changes, or dysplasia  4. Colon, sigmoid, biopsy - Mild non specific chronic inflammation with increased intraepithelial lymphocytes - Negative for acute active colitis, granuloma, chronicity changes, or dysplasia  5. Colon, rectum , biopsy - Mild non specific chronic inflammation with increased intraepithelial lymphocytes - Negative for acute active colitis, granuloma, chronicity changes, or dysplasia

## 2024-09-26 ENCOUNTER — APPOINTMENT (OUTPATIENT)
Dept: GASTROENTEROLOGY | Facility: CLINIC | Age: 69
End: 2024-09-26

## 2024-10-04 ENCOUNTER — RX RENEWAL (OUTPATIENT)
Age: 69
End: 2024-10-04

## 2024-10-19 ENCOUNTER — TRANSCRIPTION ENCOUNTER (OUTPATIENT)
Age: 69
End: 2024-10-19

## 2024-10-21 ENCOUNTER — TRANSCRIPTION ENCOUNTER (OUTPATIENT)
Age: 69
End: 2024-10-21

## 2024-11-19 ENCOUNTER — APPOINTMENT (OUTPATIENT)
Dept: GASTROENTEROLOGY | Facility: CLINIC | Age: 69
End: 2024-11-19
Payer: MEDICARE

## 2024-11-19 DIAGNOSIS — R19.7 DIARRHEA, UNSPECIFIED: ICD-10-CM

## 2024-11-19 PROCEDURE — 99443: CPT | Mod: 93

## 2024-11-19 RX ORDER — BUDESONIDE 9 MG/1
9 TABLET, EXTENDED RELEASE ORAL DAILY
Qty: 90 | Refills: 2 | Status: ACTIVE | COMMUNITY
Start: 2024-11-19 | End: 1900-01-01

## 2024-11-19 NOTE — HISTORY OF PRESENT ILLNESS
[FreeTextEntry1] : Visit type: Telephonic (phone only) Patient Location: [place], [place] Provider Location: [address] [place] [address] Consent obtained for visit:Yes Visit length: 30 minutes Others present: No   69-year-old lady history of lymphocytic colitis on budesonide since 2017 telephonic for follow-up. Plan at last office visit September 2024:  1. 6 week budesonide, then we'll try to taper 2. f/u s/p re: next steps 3. portal f/u if going to [place] for winter  fam member has similar issue - transferring care to holistic doctor  blood/stool/enzymes/probiotics gluten/dairy/caffeine free naturopathic physician  diarrhea - terrible weight loss - 10 lb --now 110 lb less mucus no blood fatty stools at times bm--5 in am, semisolid (brownie mix)--- 1 more later in afternoon ( hard felling in lower abdomen, gas) rare 1 bm a t night  can't leave the house until afternoon some days are terrible returning to [place] for summer--will think about the colonoscopy 2 weeks budesonide  fgood allergies, opathogens - gi map

## 2024-11-19 NOTE — ASSESSMENT
[FreeTextEntry1] : 69-year-old lady history of lymphocytic colitis on budesonide since 2017 telephonic for follow-up.  Patient in [place], not feeling well, not responding to the budesonide, no colonoscopy since '22, unable to do repeat procedure at this time and getting holistic care down there for now.  Calprotectin had been normal and she was responding to the budesonide at the last visit which is why I did not insist on a repeat colonoscopy at my first meeting with her a few months ago.

## 2024-11-19 NOTE — PLAN
[TextEntry] : 1.  Asked her to fax me the holistic labs that were done to date (provided fax number, email address here). 2.  Inflammatory labs 3.  Asked patient to get in touch if she needs any further advice or when she returns from [place] next summer

## 2024-11-19 NOTE — HISTORY OF PRESENT ILLNESS
[FreeTextEntry1] : Visit type: Telephonic (phone only) Patient Location: [place], [place] Provider Location: [address] [place] [address] Consent obtained for visit:Yes Visit length: 30 minutes Others present: No   69-year-old lady history of lymphocytic colitis on budesonide since 2017 telephonic for follow-up. Plan at last office visit September 2024:  1. 6 week budesonide, then we'll try to taper 2. f/u s/p re: next steps 3. portal f/u if going to [place] for winter  fam member has similar issue - transferring care to holistic doctor  blood/stool/enzymes/probiotics gluten/dairy/caffeine free naturopathic physician  diarrhea - terrible weight loss - 10 lb --now 110 lb less mucus no blood fatty stools at times bm--5 in am, semisolid (brownie mix)--- 1 more later in afternoon ( hard felling in lower abdomen, gas) rare 1 bm a t night  can't leave the house until afternoon some days are terrible returning to [place] for summer--will think about the colonoscopy 2 weeks budesonide  fgood allergies, opathogens - gi map

## 2024-12-03 ENCOUNTER — TRANSCRIPTION ENCOUNTER (OUTPATIENT)
Age: 69
End: 2024-12-03

## 2025-01-30 ENCOUNTER — APPOINTMENT (OUTPATIENT)
Dept: DERMATOLOGY | Facility: CLINIC | Age: 70
End: 2025-01-30

## 2025-04-29 NOTE — ED PROVIDER NOTE - ATTENDING CONTRIBUTION TO CARE
67 yo F s/p L radial fx and was casted by Ortho/Dr. Rodriguez and sent to ED for eval of numbness of 3rd and 4th fingers since yesterday.  Pt denies any motor loss/weakness. Pt seen by Ortho and cast bivalved and pt cleared for d/c with f/u as instructed
No indicators present

## 2025-05-06 ENCOUNTER — NON-APPOINTMENT (OUTPATIENT)
Age: 70
End: 2025-05-06

## 2025-05-15 ENCOUNTER — APPOINTMENT (OUTPATIENT)
Dept: UROLOGY | Facility: CLINIC | Age: 70
End: 2025-05-15
Payer: MEDICARE

## 2025-05-15 VITALS
HEART RATE: 70 BPM | HEIGHT: 59 IN | DIASTOLIC BLOOD PRESSURE: 93 MMHG | WEIGHT: 109 LBS | BODY MASS INDEX: 21.97 KG/M2 | SYSTOLIC BLOOD PRESSURE: 152 MMHG

## 2025-05-15 DIAGNOSIS — N39.0 URINARY TRACT INFECTION, SITE NOT SPECIFIED: ICD-10-CM

## 2025-05-15 DIAGNOSIS — N32.81 OVERACTIVE BLADDER: ICD-10-CM

## 2025-05-15 PROCEDURE — 99214 OFFICE O/P EST MOD 30 MIN: CPT

## 2025-05-15 RX ORDER — VIBEGRON 75 MG/1
75 TABLET, FILM COATED ORAL
Qty: 30 | Refills: 5 | Status: ACTIVE | COMMUNITY
Start: 2025-05-15 | End: 1900-01-01

## 2025-05-19 ENCOUNTER — APPOINTMENT (OUTPATIENT)
Dept: FAMILY MEDICINE | Facility: CLINIC | Age: 70
End: 2025-05-19

## 2025-05-19 ENCOUNTER — LABORATORY RESULT (OUTPATIENT)
Age: 70
End: 2025-05-19

## 2025-05-19 VITALS
BODY MASS INDEX: 22.18 KG/M2 | WEIGHT: 110 LBS | SYSTOLIC BLOOD PRESSURE: 116 MMHG | HEIGHT: 59 IN | OXYGEN SATURATION: 95 % | DIASTOLIC BLOOD PRESSURE: 68 MMHG | TEMPERATURE: 98.5 F | HEART RATE: 71 BPM
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
## 2025-05-19 PROCEDURE — 99213 OFFICE O/P EST LOW 20 MIN: CPT

## 2025-05-19 PROCEDURE — 36415 COLL VENOUS BLD VENIPUNCTURE: CPT

## (undated) DEVICE — VALVE ENDOSCOPE DEFENDO SINGLE USE

## (undated) DEVICE — FORCEP ENDOJAW AGTR LC W NDL 2.8MM 230CM DISP